# Patient Record
Sex: MALE | Race: WHITE | NOT HISPANIC OR LATINO | Employment: UNEMPLOYED | URBAN - METROPOLITAN AREA
[De-identification: names, ages, dates, MRNs, and addresses within clinical notes are randomized per-mention and may not be internally consistent; named-entity substitution may affect disease eponyms.]

---

## 2017-06-15 ENCOUNTER — HOSPITAL ENCOUNTER (EMERGENCY)
Facility: HOSPITAL | Age: 27
Discharge: HOME | End: 2017-06-15
Attending: EMERGENCY MEDICINE | Admitting: EMERGENCY MEDICINE
Payer: MEDICARE

## 2017-06-15 ENCOUNTER — APPOINTMENT (EMERGENCY)
Dept: CT IMAGING | Facility: HOSPITAL | Age: 27
End: 2017-06-15
Payer: MEDICARE

## 2017-06-15 VITALS
TEMPERATURE: 98.3 F | HEART RATE: 82 BPM | DIASTOLIC BLOOD PRESSURE: 73 MMHG | OXYGEN SATURATION: 98 % | RESPIRATION RATE: 16 BRPM | WEIGHT: 211.64 LBS | SYSTOLIC BLOOD PRESSURE: 127 MMHG

## 2017-06-15 DIAGNOSIS — R10.9 RIGHT SIDED ABDOMINAL PAIN: Primary | ICD-10-CM

## 2017-06-15 LAB
ALBUMIN SERPL BCP-MCNC: 4.1 G/DL (ref 3.5–5)
ALP SERPL-CCNC: 95 U/L (ref 46–116)
ALT SERPL W P-5'-P-CCNC: 25 U/L (ref 12–78)
ANION GAP SERPL CALCULATED.3IONS-SCNC: 8 MMOL/L (ref 4–13)
AST SERPL W P-5'-P-CCNC: 15 U/L (ref 5–45)
BASOPHILS # BLD AUTO: 0.03 THOUSANDS/ΜL (ref 0–0.1)
BASOPHILS NFR BLD AUTO: 1 % (ref 0–1)
BILIRUB SERPL-MCNC: 0.3 MG/DL (ref 0.2–1)
BUN SERPL-MCNC: 13 MG/DL (ref 5–25)
CALCIUM SERPL-MCNC: 9.2 MG/DL (ref 8.3–10.1)
CHLORIDE SERPL-SCNC: 105 MMOL/L (ref 100–108)
CLARITY, POC: CLEAR
CO2 SERPL-SCNC: 31 MMOL/L (ref 21–32)
COLOR, POC: YELLOW
CREAT SERPL-MCNC: 0.88 MG/DL (ref 0.6–1.3)
EOSINOPHIL # BLD AUTO: 0.16 THOUSAND/ΜL (ref 0–0.61)
EOSINOPHIL NFR BLD AUTO: 3 % (ref 0–6)
ERYTHROCYTE [DISTWIDTH] IN BLOOD BY AUTOMATED COUNT: 12.9 % (ref 11.6–15.1)
EXT BILIRUBIN, UA: NORMAL
EXT BLOOD URINE: NORMAL
EXT GLUCOSE, UA: NORMAL
EXT KETONES: NORMAL
EXT NITRITE, UA: NORMAL
EXT PH, UA: 7.5
EXT PROTEIN, UA: NORMAL
EXT SPECIFIC GRAVITY, UA: 1
EXT UROBILINOGEN: 0.2
GFR SERPL CREATININE-BSD FRML MDRD: >60 ML/MIN/1.73SQ M
GLUCOSE SERPL-MCNC: 96 MG/DL (ref 65–140)
HCT VFR BLD AUTO: 45.4 % (ref 36.5–49.3)
HGB BLD-MCNC: 15.2 G/DL (ref 12–17)
LIPASE SERPL-CCNC: 124 U/L (ref 73–393)
LYMPHOCYTES # BLD AUTO: 1.65 THOUSANDS/ΜL (ref 0.6–4.47)
LYMPHOCYTES NFR BLD AUTO: 31 % (ref 14–44)
MCH RBC QN AUTO: 28.4 PG (ref 26.8–34.3)
MCHC RBC AUTO-ENTMCNC: 33.5 G/DL (ref 31.4–37.4)
MCV RBC AUTO: 85 FL (ref 82–98)
MONOCYTES # BLD AUTO: 0.43 THOUSAND/ΜL (ref 0.17–1.22)
MONOCYTES NFR BLD AUTO: 8 % (ref 4–12)
NEUTROPHILS # BLD AUTO: 3.14 THOUSANDS/ΜL (ref 1.85–7.62)
NEUTS SEG NFR BLD AUTO: 57 % (ref 43–75)
PLATELET # BLD AUTO: 210 THOUSANDS/UL (ref 149–390)
PMV BLD AUTO: 10.6 FL (ref 8.9–12.7)
POTASSIUM SERPL-SCNC: 4.2 MMOL/L (ref 3.5–5.3)
PROT SERPL-MCNC: 7.4 G/DL (ref 6.4–8.2)
RBC # BLD AUTO: 5.36 MILLION/UL (ref 3.88–5.62)
SODIUM SERPL-SCNC: 144 MMOL/L (ref 136–145)
WBC # BLD AUTO: 5.41 THOUSAND/UL (ref 4.31–10.16)
WBC # BLD EST: NORMAL 10*3/UL

## 2017-06-15 PROCEDURE — 96374 THER/PROPH/DIAG INJ IV PUSH: CPT

## 2017-06-15 PROCEDURE — 99284 EMERGENCY DEPT VISIT MOD MDM: CPT

## 2017-06-15 PROCEDURE — 36415 COLL VENOUS BLD VENIPUNCTURE: CPT | Performed by: EMERGENCY MEDICINE

## 2017-06-15 PROCEDURE — 85025 COMPLETE CBC W/AUTO DIFF WBC: CPT | Performed by: EMERGENCY MEDICINE

## 2017-06-15 PROCEDURE — 96361 HYDRATE IV INFUSION ADD-ON: CPT

## 2017-06-15 PROCEDURE — 83690 ASSAY OF LIPASE: CPT | Performed by: EMERGENCY MEDICINE

## 2017-06-15 PROCEDURE — 74176 CT ABD & PELVIS W/O CONTRAST: CPT

## 2017-06-15 PROCEDURE — 80053 COMPREHEN METABOLIC PANEL: CPT | Performed by: EMERGENCY MEDICINE

## 2017-06-15 PROCEDURE — 81002 URINALYSIS NONAUTO W/O SCOPE: CPT | Performed by: EMERGENCY MEDICINE

## 2017-06-15 RX ORDER — ACETAMINOPHEN 325 MG/1
650 TABLET ORAL ONCE
Status: COMPLETED | OUTPATIENT
Start: 2017-06-15 | End: 2017-06-15

## 2017-06-15 RX ORDER — IBUPROFEN 600 MG/1
600 TABLET ORAL EVERY 8 HOURS PRN
Qty: 12 TABLET | Refills: 0 | Status: SHIPPED | OUTPATIENT
Start: 2017-06-15 | End: 2018-04-26

## 2017-06-15 RX ORDER — KETOROLAC TROMETHAMINE 30 MG/ML
30 INJECTION, SOLUTION INTRAMUSCULAR; INTRAVENOUS ONCE
Status: COMPLETED | OUTPATIENT
Start: 2017-06-15 | End: 2017-06-15

## 2017-06-15 RX ORDER — ONDANSETRON 4 MG/1
4 TABLET, ORALLY DISINTEGRATING ORAL EVERY 6 HOURS PRN
Qty: 10 TABLET | Refills: 0 | Status: SHIPPED | OUTPATIENT
Start: 2017-06-15 | End: 2018-04-26

## 2017-06-15 RX ADMIN — SODIUM CHLORIDE 1000 ML: 0.9 INJECTION, SOLUTION INTRAVENOUS at 11:24

## 2017-06-15 RX ADMIN — KETOROLAC TROMETHAMINE 30 MG: 30 INJECTION, SOLUTION INTRAMUSCULAR at 11:24

## 2017-06-15 RX ADMIN — ACETAMINOPHEN 650 MG: 325 TABLET, FILM COATED ORAL at 12:19

## 2017-06-22 ENCOUNTER — HOSPITAL ENCOUNTER (EMERGENCY)
Facility: HOSPITAL | Age: 27
Discharge: HOME | End: 2017-06-22
Attending: EMERGENCY MEDICINE
Payer: MEDICARE

## 2017-06-22 ENCOUNTER — APPOINTMENT (EMERGENCY)
Dept: CT IMAGING | Facility: HOSPITAL | Age: 27
End: 2017-06-22
Payer: MEDICARE

## 2017-06-22 VITALS
RESPIRATION RATE: 18 BRPM | HEART RATE: 71 BPM | OXYGEN SATURATION: 96 % | SYSTOLIC BLOOD PRESSURE: 112 MMHG | WEIGHT: 211.64 LBS | TEMPERATURE: 97.8 F | DIASTOLIC BLOOD PRESSURE: 57 MMHG

## 2017-06-22 DIAGNOSIS — R10.9 ABDOMINAL PAIN: Primary | ICD-10-CM

## 2017-06-22 LAB
ALBUMIN SERPL BCP-MCNC: 3.9 G/DL (ref 3.5–5)
ALP SERPL-CCNC: 100 U/L (ref 46–116)
ALT SERPL W P-5'-P-CCNC: 27 U/L (ref 12–78)
ANION GAP SERPL CALCULATED.3IONS-SCNC: 7 MMOL/L (ref 4–13)
AST SERPL W P-5'-P-CCNC: 16 U/L (ref 5–45)
BASOPHILS # BLD AUTO: 0.04 THOUSANDS/ΜL (ref 0–0.1)
BASOPHILS NFR BLD AUTO: 1 % (ref 0–1)
BILIRUB SERPL-MCNC: 0.6 MG/DL (ref 0.2–1)
BUN SERPL-MCNC: 15 MG/DL (ref 5–25)
CALCIUM SERPL-MCNC: 9.1 MG/DL (ref 8.3–10.1)
CHLORIDE SERPL-SCNC: 105 MMOL/L (ref 100–108)
CO2 SERPL-SCNC: 30 MMOL/L (ref 21–32)
CREAT SERPL-MCNC: 0.89 MG/DL (ref 0.6–1.3)
EOSINOPHIL # BLD AUTO: 0.12 THOUSAND/ΜL (ref 0–0.61)
EOSINOPHIL NFR BLD AUTO: 2 % (ref 0–6)
ERYTHROCYTE [DISTWIDTH] IN BLOOD BY AUTOMATED COUNT: 13 % (ref 11.6–15.1)
GFR SERPL CREATININE-BSD FRML MDRD: >60 ML/MIN/1.73SQ M
GLUCOSE SERPL-MCNC: 101 MG/DL (ref 65–140)
HCT VFR BLD AUTO: 44.3 % (ref 36.5–49.3)
HGB BLD-MCNC: 15 G/DL (ref 12–17)
LIPASE SERPL-CCNC: 108 U/L (ref 73–393)
LYMPHOCYTES # BLD AUTO: 2.53 THOUSANDS/ΜL (ref 0.6–4.47)
LYMPHOCYTES NFR BLD AUTO: 32 % (ref 14–44)
MCH RBC QN AUTO: 28.4 PG (ref 26.8–34.3)
MCHC RBC AUTO-ENTMCNC: 33.9 G/DL (ref 31.4–37.4)
MCV RBC AUTO: 84 FL (ref 82–98)
MONOCYTES # BLD AUTO: 0.47 THOUSAND/ΜL (ref 0.17–1.22)
MONOCYTES NFR BLD AUTO: 6 % (ref 4–12)
NEUTROPHILS # BLD AUTO: 4.65 THOUSANDS/ΜL (ref 1.85–7.62)
NEUTS SEG NFR BLD AUTO: 59 % (ref 43–75)
PLATELET # BLD AUTO: 210 THOUSANDS/UL (ref 149–390)
PMV BLD AUTO: 10.9 FL (ref 8.9–12.7)
POTASSIUM SERPL-SCNC: 3.6 MMOL/L (ref 3.5–5.3)
PROT SERPL-MCNC: 7.2 G/DL (ref 6.4–8.2)
RBC # BLD AUTO: 5.28 MILLION/UL (ref 3.88–5.62)
SODIUM SERPL-SCNC: 142 MMOL/L (ref 136–145)
WBC # BLD AUTO: 7.81 THOUSAND/UL (ref 4.31–10.16)

## 2017-06-22 PROCEDURE — 85025 COMPLETE CBC W/AUTO DIFF WBC: CPT | Performed by: EMERGENCY MEDICINE

## 2017-06-22 PROCEDURE — 96375 TX/PRO/DX INJ NEW DRUG ADDON: CPT

## 2017-06-22 PROCEDURE — 74177 CT ABD & PELVIS W/CONTRAST: CPT

## 2017-06-22 PROCEDURE — 80053 COMPREHEN METABOLIC PANEL: CPT | Performed by: EMERGENCY MEDICINE

## 2017-06-22 PROCEDURE — 96374 THER/PROPH/DIAG INJ IV PUSH: CPT

## 2017-06-22 PROCEDURE — 36415 COLL VENOUS BLD VENIPUNCTURE: CPT | Performed by: EMERGENCY MEDICINE

## 2017-06-22 PROCEDURE — 83690 ASSAY OF LIPASE: CPT | Performed by: EMERGENCY MEDICINE

## 2017-06-22 PROCEDURE — 99284 EMERGENCY DEPT VISIT MOD MDM: CPT

## 2017-06-22 RX ORDER — KETOROLAC TROMETHAMINE 30 MG/ML
15 INJECTION, SOLUTION INTRAMUSCULAR; INTRAVENOUS ONCE
Status: COMPLETED | OUTPATIENT
Start: 2017-06-22 | End: 2017-06-22

## 2017-06-22 RX ORDER — ONDANSETRON 2 MG/ML
4 INJECTION INTRAMUSCULAR; INTRAVENOUS ONCE
Status: COMPLETED | OUTPATIENT
Start: 2017-06-22 | End: 2017-06-22

## 2017-06-22 RX ORDER — TRAMADOL HYDROCHLORIDE 50 MG/1
50 TABLET ORAL EVERY 6 HOURS PRN
Qty: 30 TABLET | Refills: 0 | Status: SHIPPED | OUTPATIENT
Start: 2017-06-22 | End: 2017-07-02

## 2017-06-22 RX ORDER — ACETAMINOPHEN 325 MG/1
650 TABLET ORAL ONCE
Status: COMPLETED | OUTPATIENT
Start: 2017-06-22 | End: 2017-06-22

## 2017-06-22 RX ADMIN — ONDANSETRON 4 MG: 2 INJECTION INTRAMUSCULAR; INTRAVENOUS at 18:51

## 2017-06-22 RX ADMIN — IOHEXOL 50 ML: 240 INJECTION, SOLUTION INTRATHECAL; INTRAVASCULAR; INTRAVENOUS; ORAL at 18:51

## 2017-06-22 RX ADMIN — KETOROLAC TROMETHAMINE 15 MG: 30 INJECTION, SOLUTION INTRAMUSCULAR at 18:50

## 2017-06-22 RX ADMIN — ACETAMINOPHEN 650 MG: 325 TABLET, FILM COATED ORAL at 20:34

## 2017-06-22 RX ADMIN — IOHEXOL 100 ML: 350 INJECTION, SOLUTION INTRAVENOUS at 19:57

## 2017-06-27 ENCOUNTER — TRANSCRIBE ORDERS (OUTPATIENT)
Dept: ADMINISTRATIVE | Facility: HOSPITAL | Age: 27
End: 2017-06-27

## 2017-06-27 DIAGNOSIS — R10.11 ABDOMINAL PAIN, RIGHT UPPER QUADRANT: Primary | ICD-10-CM

## 2017-06-29 ENCOUNTER — HOSPITAL ENCOUNTER (OUTPATIENT)
Dept: ULTRASOUND IMAGING | Facility: HOSPITAL | Age: 27
Discharge: HOME | End: 2017-06-29
Attending: INTERNAL MEDICINE
Payer: MEDICARE

## 2017-06-29 DIAGNOSIS — R10.11 ABDOMINAL PAIN, RIGHT UPPER QUADRANT: ICD-10-CM

## 2017-06-29 PROCEDURE — 76705 ECHO EXAM OF ABDOMEN: CPT

## 2018-04-01 ENCOUNTER — HOSPITAL ENCOUNTER (EMERGENCY)
Facility: HOSPITAL | Age: 28
Discharge: HOME | End: 2018-04-01
Attending: EMERGENCY MEDICINE | Admitting: EMERGENCY MEDICINE
Payer: MEDICARE

## 2018-04-01 VITALS
HEART RATE: 97 BPM | TEMPERATURE: 98.3 F | OXYGEN SATURATION: 98 % | WEIGHT: 199.4 LBS | RESPIRATION RATE: 16 BRPM | DIASTOLIC BLOOD PRESSURE: 87 MMHG | SYSTOLIC BLOOD PRESSURE: 145 MMHG

## 2018-04-01 DIAGNOSIS — K62.5 RECTAL BLEEDING: ICD-10-CM

## 2018-04-01 DIAGNOSIS — K62.3 RECTAL PROLAPSE: Primary | ICD-10-CM

## 2018-04-01 PROCEDURE — 99283 EMERGENCY DEPT VISIT LOW MDM: CPT

## 2018-04-02 NOTE — ED PROVIDER NOTES
History  Chief Complaint   Patient presents with    Rectal Pain     Pt reports increase rectal pain/bleeding  Pt reports hx of rectal proplapse  22-year-old male presents to the emergency department for evaluation of rectal discomfort  Patient has a longstanding history of constipation  Patient states that he has a history of rectal prolapse  He was evaluated by Colorectal surgery who recommended the patient try modifying his diet prior to have any surgical intervention for his rectal prolapse  Patient states that he has been having 2 bowel movements daily  He does no rectal prolapse on most bowel movements  This evening he had a bowel movement and his rectum prolapsed more than it had ever in the past and he noticed a small amount of bright red bleeding  Patient is able to mainly reduce the prolapse at home  Patient presents because he is now interested in pursuing surgery  Patient denies fevers or chills  He has mild abdominal pain which he reports is chronic  He has no lightheadedness or palpitations  No shortness of breath  He has no active bleeding  History provided by:  Patient and medical records  Rectal Bleeding - Minor   Quality:  Bright red  Amount:  Unable to specify  Duration: Chronic, daily  Timing:  Intermittent  Chronicity:  Chronic  Context: constipation, defecation and rectal pain    Context: not diarrhea, not foreign body and not rectal injury    Pain details:     Quality:  Sharp    Severity:  Mild    Timing:  Worse with defecation    Progression:  Waxing and waning  Similar prior episodes: yes    Relieved by:  Nothing  Worsened by:  Nothing  Associated symptoms: abdominal pain    Associated symptoms: no dizziness, no fever, no recent illness and no vomiting    Risk factors: no anticoagulant use, no hx of colorectal cancer and no hx of colorectal surgery        Prior to Admission Medications   Prescriptions Last Dose Informant Patient Reported?  Taking?   ibuprofen (MOTRIN) 600 mg tablet   No No   Sig: Take 1 tablet by mouth every 8 (eight) hours as needed for mild pain or moderate pain (pain)   ondansetron (ZOFRAN-ODT) 4 mg disintegrating tablet   No No   Sig: Take 1 tablet by mouth every 6 (six) hours as needed for nausea      Facility-Administered Medications: None       Past Medical History:   Diagnosis Date    Asperger's syndrome        Past Surgical History:   Procedure Laterality Date    HERNIA REPAIR      SURGERY SCROTAL / TESTICULAR         History reviewed  No pertinent family history  I have reviewed and agree with the history as documented  Social History   Substance Use Topics    Smoking status: Current Every Day Smoker     Packs/day: 0 50    Smokeless tobacco: Not on file    Alcohol use No        Review of Systems   Constitutional: Negative for fever  Gastrointestinal: Positive for abdominal pain, anal bleeding, constipation, hematochezia and rectal pain  Negative for blood in stool and vomiting  Neurological: Negative for dizziness  All other systems reviewed and are negative  Physical Exam  ED Triage Vitals [04/01/18 2256]   Temperature Pulse Respirations Blood Pressure SpO2   98 3 °F (36 8 °C) 97 16 145/87 98 %      Temp Source Heart Rate Source Patient Position - Orthostatic VS BP Location FiO2 (%)   Oral -- Sitting Left arm --      Pain Score       4           Orthostatic Vital Signs  Vitals:    04/01/18 2256   BP: 145/87   Pulse: 97   Patient Position - Orthostatic VS: Sitting       Physical Exam   Constitutional: He is oriented to person, place, and time  He appears well-developed and well-nourished  HENT:   Head: Normocephalic  Right Ear: External ear normal    Left Ear: External ear normal    Nose: Nose normal    Mouth/Throat: Oropharynx is clear and moist    Eyes: EOM and lids are normal  Pupils are equal, round, and reactive to light  Neck: Normal range of motion  Neck supple  Pulmonary/Chest: Effort normal  No respiratory distress  Abdominal: Soft  Bowel sounds are normal  He exhibits no distension and no mass  There is no tenderness  There is no rebound and no guarding  No hernia  Genitourinary: Rectal exam shows fissure  Rectal exam shows no external hemorrhoid and no mass  Genitourinary Comments: There is no rectal prolapse at this time  When the patient bears down gently the rectum does prolapse but then returns to his normal and topical position  There is inflammation of the anal sphincter and appears to be a small anterior anal fissure that may have been bleeding  This appears chronic and is nontender   Musculoskeletal: Normal range of motion  He exhibits no deformity  Neurological: He is alert and oriented to person, place, and time  Skin: Skin is warm and dry  Psychiatric: He has a normal mood and affect  Nursing note and vitals reviewed  ED Medications  Medications - No data to display    Diagnostic Studies  Results Reviewed     None                 No orders to display              Procedures  Procedures       Phone Contacts  ED Phone Contact    ED Course  ED Course                                MDM  Number of Diagnoses or Management Options  Rectal bleeding: new and requires workup  Rectal prolapse: new and requires workup     Amount and/or Complexity of Data Reviewed  Decide to obtain previous medical records or to obtain history from someone other than the patient: yes  Independent visualization of images, tracings, or specimens: yes    Risk of Complications, Morbidity, and/or Mortality  General comments: 49-year-old male with chronic rectal prolapse and intermittent rectal bleeding presents to the ED because he would like to discuss surgical options with Colorectal surgery  Patient is not currently bleeding  He has normal vital signs  He does not have a prolapse that needs to be reduced  Discussed signs and symptoms to return to the emergency department    Patient will contact Colorectal surgery's office tomorrow to arrange for a follow-up appointment to discuss surgical options      CritCare Time    Disposition  Final diagnoses:   Rectal prolapse   Rectal bleeding     Time reflects when diagnosis was documented in both MDM as applicable and the Disposition within this note     Time User Action Codes Description Comment    4/1/2018 11:14 PM Maxx Cole Add [K62 3] Rectal prolapse     4/1/2018 11:14 PM SarayTomasa Add [K62 5] Rectal bleeding       ED Disposition     ED Disposition Condition Comment    Discharge  Guanako Casiano discharge to home/self care  Condition at discharge: Stable        Follow-up Information     Follow up With Specialties Details Why Contact Info    Essie Pineda MD Colon and Rectal Surgery Schedule an appointment as soon as possible for a visit in 1 day For recheck of current symptoms [address]  [phone]          Discharge Medication List as of 4/1/2018 11:16 PM      CONTINUE these medications which have NOT CHANGED    Details   ibuprofen (MOTRIN) 600 mg tablet Take 1 tablet by mouth every 8 (eight) hours as needed for mild pain or moderate pain (pain), Starting Thu 6/15/2017, Print      ondansetron (ZOFRAN-ODT) 4 mg disintegrating tablet Take 1 tablet by mouth every 6 (six) hours as needed for nausea, Starting Thu 6/15/2017, Print           No discharge procedures on file      ED Provider  Electronically Signed by           Cathy Apley, DO  04/01/18 9341

## 2018-04-02 NOTE — DISCHARGE INSTRUCTIONS
Rectal Prolapse   WHAT YOU NEED TO KNOW:   What is a rectal prolapse? A rectal prolapse is a condition that causes your rectum to come through your anus  The rectum is the end of your bowel  A prolapse may happen during a bowel movement  A prolapse may happen more often in women after childbirth or who are older than 50 years  What causes a rectal prolapse? · Damage to your anal sphincter  may cause your anus to open wider than it should  Your anal sphincter is a ring of muscle around your anus  This muscle controls the opening and closing of your anus  Damage to your anal sphincter may occur from trauma such as pelvic floor surgery  · Damage to the muscles and ligaments  of your rectum and anus may cause a rectal prolapse  Ligaments help hold your rectum and anus in place  Muscles control how your rectum moves bowel movements through your anus  These muscles may become damaged from trauma such as childbirth or pelvic floor surgery  · You may have more space  between your rectum and other organs in your pelvis  Your rectum may be longer than normal  These changes can cause your rectum to move out of place and through your anus  What increases my risk for a rectal prolapse? · Constipation  may cause you to push too hard during a bowel movement  The pressure from pushing may cause the rectum to come through the anus  · A chronic condition  can cause problems that lead to a prolapse  Cystic fibrosis can cause chronic constipation  Multiple sclerosis can weaken muscles that hold your rectum in place  · A condition  such as a stroke or spinal cord injury can damage nerves and muscles that make the rectum work  Rectal muscles may be too weak to hold your rectum in place  · Pelvic surgery  can damage the nerves, muscles or ligaments of the rectum  What are the signs and symptoms of a rectal prolapse?    · Pain or discomfort during a bowel movement    · A swollen, red mass coming from your anus    · Bleeding or mucus from your rectum    · A small amount of blood in your bowel movement    · Feeling like you still need to have a bowel movement after you use the bathroom    · Trouble controlling your bowel movements  How is a rectal prolapse diagnosed? Your healthcare provider will ask when the prolapse happened  He will ask about any pain or other symptoms you had during and after the prolapse  He may ask about your bowel habits and foods you eat  Tell him about other medical conditions you have  If you are a woman, he may ask if you recently gave birth  You may also need any of the following tests:  · Your healthcare provider will examine your anus  to check for a rectal prolapse  He may also check for rectal polyps  A rectal polyp is a small growth of tissue in the lining of the rectum  He may also feel inside of your anus to check for bumps that he cannot see from the outside  He may have you push like you are having a bowel movement and watch for a rectal prolapse  · An x-ray, ultrasound, CT, or MRI  may show problems with your rectum or the muscles, nerves and ligaments that control it  You may be given contrast liquid to help the rectum show up better in the pictures  You may be given contrast dye into your rectum  Tell the healthcare provider if you have ever had an allergic reaction to contrast liquid  Do not enter the MRI room with anything metal  Metal can cause serious injury  Tell the healthcare provider if you have any metal in or on your body  How is a rectal prolapse treated? Treatment of rectal prolapse depends on the severity  You may need any of the following:  · Stool softeners  help prevent constipation  · Laxatives  help your intestines relax and loosen to prevent constipation  · Injections  may prevent your rectum from moving through your anus  You may be given one or more shots of numbing medicine   A needle will be inserted into the rectum and medicine will be given  You may feel some pushing or discomfort as the needle enters your rectum  · Surgery  may be needed if other treatments do not work  The type of surgery may depend on the cause of your rectal prolapse  Surgery can help position your rectum so that it does not come down through your anus  Surgery may include placing sutures or mesh to hold the rectum in place  It may also involve removing part of the rectum  How can I decrease my risk for a rectal prolapse? · Eat more high-fiber foods  This may help decrease constipation by adding bulk and softness to your bowel movements  Your healthcare provider can help you create a meal plan that includes high-fiber foods  High fiber foods include fruit, vegetables, whole-grain breads, and cooked beans  · Increase the amount of liquid you drink  Liquids can help keep your bowel movements soft and prevent constipation  Ask your healthcare provider how much liquid you should drink each day  · Exercise your pelvic muscles  Kegel exercises strengthen the pelvic muscles  These exercises involve tightening and relaxing vaginal and rectal muscles  Kegel exercises can make the rectal muscles stronger and improve bowel control  Ask your healthcare provider for more information on how to do kegel exercises  · Do not sit for long amounts of time  You may put too much pressure on your anus  Pressure on your anus may cause a rectal prolapse  What is manual reduction of a rectal prolapse? Manual reduction is a procedure you can do to place your rectum back inside of your anus  Your healthcare provider will show you how to do a manual reduction  You may need a family member to help you with manual reduction  The following are general steps to follow  Your healthcare provider may give you specific steps to follow  · Your healthcare provider may tell you to apply sugar to your rectum before manual reduction   This may help decrease the swelling of your rectum, and make it easier to put back inside your anus  Ask your healthcare provider about how to apply sugar to your rectum  · Lie on your back with your knees bent  · Wash your hands and put on gloves  Lubricate your glove with petroleum jelly  · Hold your rectum on both sides of the anus  Gently apply firm, steady pressure on your rectum and push it into your anus  You may need to apply pressure for several minutes if the bowel is swollen  Inspect your anus  You can use a mirror or have your family member inspect your anus  You should not see the rectum  If a prolapse happens again, you can repeat manual reduction  · You can hold the rectum in place with gauze and tape across your buttocks  Before you apply gauze, place a quarter size amount of petroleum jelly on the gauze  The petroleum jelly will prevent the gauze from sticking to your rectum  Remove the gauze as directed by your healthcare provider  Call 911 for any of the following:   · You have trouble breathing  · Your heart is beating faster than usual   When should I seek immediate care? · You have severe pain in your abdomen  · Your abdomen looks bigger than usual      · Blood from your rectum soaks through your underwear  When should I contact my healthcare provider? · You have a fever  · You have nausea or are vomiting  · You see larger amounts of blood in your bowel movement than before  · You have questions or concerns about your condition or care  CARE AGREEMENT:   You have the right to help plan your care  Learn about your health condition and how it may be treated  Discuss treatment options with your caregivers to decide what care you want to receive  You always have the right to refuse treatment  The above information is an  only  It is not intended as medical advice for individual conditions or treatments   Talk to your doctor, nurse or pharmacist before following any medical regimen to see if it is safe and effective for you  © 2017 [employer] Information is for End User's use only and may not be sold, redistributed or otherwise used for commercial purposes  All illustrations and images included in CareNotes® are the copyrighted property of [employer]  or [employer]  Rectal Bleeding   WHAT YOU NEED TO KNOW:   What can cause rectal bleeding? · Constipation    · Hemorrhoids (swollen blood vessels in your rectum)    · Anal fissures (tears in the tissue inside your anus)    · Medical conditions, such as cancer, colitis, or diverticulitis     · Growths, such as tumors or polyps    · Medical treatments, such as radiation or rectal surgery  What increases my risk for rectal bleeding? · Older age    · Certain medicines, such as blood thinners and NSAIDs    · Medical conditions, such as inflammatory bowel disease, liver disease, or HIV  What other signs and symptoms may happen with rectal bleeding? You may have pain in your rectum or anus  You may also have abdominal pain or cramping  How is the cause of rectal bleeding diagnosed? · Rectal exam:  Your healthcare provider may gently insert a gloved finger into your anus  He will collect a bowel movement sample and send it to a lab for tests  · Blood tests: You may need blood taken to check for anemia (low amount of red blood cells)  · CT scan: This test is also called a CAT scan  An x-ray machine uses a computer to take pictures of the organs and blood vessels in your abdomen  The pictures may show problems that could cause bleeding  You may be given a dye before the pictures are taken to help healthcare providers see the pictures better  Tell the healthcare provider if you have ever had an allergic reaction to contrast dye  · Colonoscopy: This is a procedure to look inside your lower bowel  It may show where the bleeding is coming from and what is causing it   A tube with a light on the end will be put into your anus and then moved into your colon  If your healthcare provider finds a growth, he may remove it  · Endoscopy: This is a procedure to look inside your upper bowel  It may show where the bleeding is coming from and what is causing it  A tube with a light on the end is inserted into your throat and moved down into your stomach and upper bowel  If your healthcare provider finds a growth, he may remove it  He may put a shot of medicine in bleeding areas to narrow the blood vessels and stop the bleeding  Heat, laser, or electric currents may also be used to make the blood clot  How is rectal bleeding treated? · Medicine:      ¨ Pain medicine: You may be given a prescription medicine to decrease pain  Do not wait until the pain is severe before you take this medicine  ¨ Vasoconstrictors: This medicine decreases the size of your blood vessels and may help stop the bleeding  ¨ Iron supplement:  Iron helps your body make more red blood cells  ¨ Steroids: This medicine decreases inflammation in your rectum  It may be applied as a cream, ointment, or lotion  · IV:  You may need an IV if you are dehydrated and need extra liquids  · Blood transfusion:  You will get whole or parts of blood through an IV during a transfusion  Blood is tested for diseases, such as hepatitis and HIV, to be sure it is safe  · Surgery: You may need surgery to remove hemorrhoids, tumors, or polyps  What are the risks of rectal bleeding? · You may have abdominal pain or damage to nearby organs and blood vessels with surgery  Even with treatment, rectal bleeding may continue  Or, it may go away for a time and start again  · Without treatment, you may continue to have pain and cramping  You may develop anemia  You may need a blood transfusion  You may lose a large amount of blood  This can be life-threatening  How can I manage my symptoms?   Ask your healthcare provider how much liquid to drink each day and which liquids are best for you  This will help prevent dehydration and constipation  When should I contact my healthcare provider? · You have a fever  · Your rectal bleeding stopped for a time, but has started again  · You have nausea  · You have cold, sweaty, pale skin  · You have changes in your bowel movements, such as diarrhea  · You have questions or concerns about your condition or care  When should I seek immediate care or call 911? · You are breathing faster than usual     · You are dizzy, lightheaded, or feel faint  · You are confused or cannot think clearly  · You urinate less than usual or not at all  · Your rectal bleeding is constant or heavy  · You have severe abdominal pain or cramping  CARE AGREEMENT:   You have the right to help plan your care  Learn about your health condition and how it may be treated  Discuss treatment options with your caregivers to decide what care you want to receive  You always have the right to refuse treatment  The above information is an  only  It is not intended as medical advice for individual conditions or treatments  Talk to your doctor, nurse or pharmacist before following any medical regimen to see if it is safe and effective for you  © 2017 [employer]  Information is for End User's use only and may not be sold, redistributed or otherwise used for commercial purposes  All illustrations and images included in CareNotes® are the copyrighted property of [employer]  or [employer]

## 2018-04-23 ENCOUNTER — APPOINTMENT (OUTPATIENT)
Dept: LAB | Facility: CLINIC | Age: 28
End: 2018-04-23
Payer: MEDICARE

## 2018-04-23 ENCOUNTER — OFFICE VISIT (OUTPATIENT)
Dept: LAB | Facility: CLINIC | Age: 28
End: 2018-04-23
Payer: MEDICARE

## 2018-04-23 ENCOUNTER — APPOINTMENT (OUTPATIENT)
Dept: RADIOLOGY | Facility: CLINIC | Age: 28
End: 2018-04-23
Payer: MEDICARE

## 2018-04-23 ENCOUNTER — TRANSCRIBE ORDERS (OUTPATIENT)
Dept: LAB | Facility: CLINIC | Age: 28
End: 2018-04-23

## 2018-04-23 DIAGNOSIS — K62.3 RECTAL PROLAPSE: ICD-10-CM

## 2018-04-23 DIAGNOSIS — K62.3 RECTAL PROLAPSE: Primary | ICD-10-CM

## 2018-04-23 LAB
ABO GROUP BLD: NORMAL
ALBUMIN SERPL BCP-MCNC: 4 G/DL (ref 3.5–5)
ALP SERPL-CCNC: 106 U/L (ref 46–116)
ALT SERPL W P-5'-P-CCNC: 20 U/L (ref 12–78)
ANION GAP SERPL CALCULATED.3IONS-SCNC: 9 MMOL/L (ref 4–13)
AST SERPL W P-5'-P-CCNC: 19 U/L (ref 5–45)
BASOPHILS # BLD AUTO: 0.02 THOUSANDS/ΜL (ref 0–0.1)
BASOPHILS NFR BLD AUTO: 0 % (ref 0–1)
BILIRUB SERPL-MCNC: 0.5 MG/DL (ref 0.2–1)
BILIRUB UR QL STRIP: NEGATIVE
BLD GP AB SCN SERPL QL: NEGATIVE
BUN SERPL-MCNC: 11 MG/DL (ref 5–25)
CALCIUM SERPL-MCNC: 9.1 MG/DL (ref 8.3–10.1)
CHLORIDE SERPL-SCNC: 104 MMOL/L (ref 100–108)
CLARITY UR: NORMAL
CO2 SERPL-SCNC: 30 MMOL/L (ref 21–32)
COLOR UR: YELLOW
CREAT SERPL-MCNC: 0.88 MG/DL (ref 0.6–1.3)
EOSINOPHIL # BLD AUTO: 0.09 THOUSAND/ΜL (ref 0–0.61)
EOSINOPHIL NFR BLD AUTO: 2 % (ref 0–6)
ERYTHROCYTE [DISTWIDTH] IN BLOOD BY AUTOMATED COUNT: 12.8 % (ref 11.6–15.1)
EST. AVERAGE GLUCOSE BLD GHB EST-MCNC: 100 MG/DL
GFR SERPL CREATININE-BSD FRML MDRD: 117 ML/MIN/1.73SQ M
GLUCOSE P FAST SERPL-MCNC: 92 MG/DL (ref 65–99)
GLUCOSE UR STRIP-MCNC: NEGATIVE MG/DL
HBA1C MFR BLD: 5.1 % (ref 4.2–6.3)
HCT VFR BLD AUTO: 44.8 % (ref 36.5–49.3)
HGB BLD-MCNC: 14.7 G/DL (ref 12–17)
HGB UR QL STRIP.AUTO: NEGATIVE
KETONES UR STRIP-MCNC: NEGATIVE MG/DL
LEUKOCYTE ESTERASE UR QL STRIP: NEGATIVE
LYMPHOCYTES # BLD AUTO: 1.92 THOUSANDS/ΜL (ref 0.6–4.47)
LYMPHOCYTES NFR BLD AUTO: 38 % (ref 14–44)
MCH RBC QN AUTO: 27.9 PG (ref 26.8–34.3)
MCHC RBC AUTO-ENTMCNC: 32.8 G/DL (ref 31.4–37.4)
MCV RBC AUTO: 85 FL (ref 82–98)
MONOCYTES # BLD AUTO: 0.33 THOUSAND/ΜL (ref 0.17–1.22)
MONOCYTES NFR BLD AUTO: 7 % (ref 4–12)
NEUTROPHILS # BLD AUTO: 2.73 THOUSANDS/ΜL (ref 1.85–7.62)
NEUTS SEG NFR BLD AUTO: 53 % (ref 43–75)
NITRITE UR QL STRIP: NEGATIVE
PH UR STRIP.AUTO: 7 [PH] (ref 4.5–8)
PLATELET # BLD AUTO: 215 THOUSANDS/UL (ref 149–390)
PMV BLD AUTO: 10 FL (ref 8.9–12.7)
POTASSIUM SERPL-SCNC: 4.1 MMOL/L (ref 3.5–5.3)
PROT SERPL-MCNC: 7 G/DL (ref 6.4–8.2)
PROT UR STRIP-MCNC: NEGATIVE MG/DL
RBC # BLD AUTO: 5.27 MILLION/UL (ref 3.88–5.62)
RH BLD: POSITIVE
SODIUM SERPL-SCNC: 143 MMOL/L (ref 136–145)
SP GR UR STRIP.AUTO: 1.01 (ref 1–1.03)
SPECIMEN EXPIRATION DATE: NORMAL
UROBILINOGEN UR QL STRIP.AUTO: 0.2 E.U./DL
WBC # BLD AUTO: 5.09 THOUSAND/UL (ref 4.31–10.16)

## 2018-04-23 PROCEDURE — 71046 X-RAY EXAM CHEST 2 VIEWS: CPT

## 2018-04-23 PROCEDURE — 83036 HEMOGLOBIN GLYCOSYLATED A1C: CPT

## 2018-04-23 PROCEDURE — 86850 RBC ANTIBODY SCREEN: CPT

## 2018-04-23 PROCEDURE — 36415 COLL VENOUS BLD VENIPUNCTURE: CPT

## 2018-04-23 PROCEDURE — 86901 BLOOD TYPING SEROLOGIC RH(D): CPT

## 2018-04-23 PROCEDURE — 93005 ELECTROCARDIOGRAM TRACING: CPT

## 2018-04-23 PROCEDURE — 86900 BLOOD TYPING SEROLOGIC ABO: CPT

## 2018-04-23 PROCEDURE — 85025 COMPLETE CBC W/AUTO DIFF WBC: CPT

## 2018-04-23 PROCEDURE — 80053 COMPREHEN METABOLIC PANEL: CPT

## 2018-04-23 PROCEDURE — 81003 URINALYSIS AUTO W/O SCOPE: CPT | Performed by: COLON & RECTAL SURGERY

## 2018-04-24 LAB
ATRIAL RATE: 66 BPM
P AXIS: 46 DEGREES
PR INTERVAL: 128 MS
QRS AXIS: 27 DEGREES
QRSD INTERVAL: 92 MS
QT INTERVAL: 404 MS
QTC INTERVAL: 423 MS
T WAVE AXIS: 28 DEGREES
VENTRICULAR RATE: 66 BPM

## 2018-04-24 PROCEDURE — 93010 ELECTROCARDIOGRAM REPORT: CPT | Performed by: INTERNAL MEDICINE

## 2018-05-07 ENCOUNTER — HOSPITAL ENCOUNTER (OUTPATIENT)
Facility: HOSPITAL | Age: 28
Setting detail: OUTPATIENT SURGERY
Discharge: HOME | DRG: 330 | End: 2018-05-07
Attending: COLON & RECTAL SURGERY | Admitting: COLON & RECTAL SURGERY
Payer: MEDICARE

## 2018-05-07 ENCOUNTER — ANESTHESIA EVENT (OUTPATIENT)
Dept: GASTROENTEROLOGY | Facility: HOSPITAL | Age: 28
DRG: 330 | End: 2018-05-07
Payer: MEDICARE

## 2018-05-07 ENCOUNTER — ANESTHESIA (OUTPATIENT)
Dept: GASTROENTEROLOGY | Facility: HOSPITAL | Age: 28
DRG: 330 | End: 2018-05-07
Payer: MEDICARE

## 2018-05-07 VITALS
OXYGEN SATURATION: 97 % | DIASTOLIC BLOOD PRESSURE: 70 MMHG | TEMPERATURE: 97.9 F | BODY MASS INDEX: 27.09 KG/M2 | RESPIRATION RATE: 18 BRPM | SYSTOLIC BLOOD PRESSURE: 110 MMHG | HEIGHT: 72 IN | WEIGHT: 200 LBS | HEART RATE: 52 BPM

## 2018-05-07 DIAGNOSIS — K62.3 RECTAL PROLAPSE: ICD-10-CM

## 2018-05-07 PROCEDURE — 88305 TISSUE EXAM BY PATHOLOGIST: CPT | Performed by: PATHOLOGY

## 2018-05-07 PROCEDURE — 0DJD8ZZ INSPECTION OF LOWER INTESTINAL TRACT, VIA NATURAL OR ARTIFICIAL OPENING ENDOSCOPIC: ICD-10-PCS | Performed by: COLON & RECTAL SURGERY

## 2018-05-07 RX ORDER — SODIUM CHLORIDE 9 MG/ML
50 INJECTION, SOLUTION INTRAVENOUS CONTINUOUS
Status: DISCONTINUED | OUTPATIENT
Start: 2018-05-07 | End: 2018-05-08 | Stop reason: HOSPADM

## 2018-05-07 RX ORDER — PROPOFOL 10 MG/ML
INJECTION, EMULSION INTRAVENOUS AS NEEDED
Status: DISCONTINUED | OUTPATIENT
Start: 2018-05-07 | End: 2018-05-07 | Stop reason: SURG

## 2018-05-07 RX ORDER — FLUTICASONE PROPIONATE 50 MCG
1 SPRAY, SUSPENSION (ML) NASAL DAILY
COMMUNITY
End: 2020-07-31

## 2018-05-07 RX ORDER — PROPOFOL 10 MG/ML
INJECTION, EMULSION INTRAVENOUS CONTINUOUS PRN
Status: DISCONTINUED | OUTPATIENT
Start: 2018-05-07 | End: 2018-05-07 | Stop reason: SURG

## 2018-05-07 RX ADMIN — PROPOFOL 120 MCG/KG/MIN: 10 INJECTION, EMULSION INTRAVENOUS at 09:59

## 2018-05-07 RX ADMIN — SODIUM CHLORIDE 50 ML/HR: 0.9 INJECTION, SOLUTION INTRAVENOUS at 09:54

## 2018-05-07 RX ADMIN — PROPOFOL 100 MG: 10 INJECTION, EMULSION INTRAVENOUS at 09:59

## 2018-05-07 NOTE — OP NOTE
OPERATIVE REPORT  PATIENT NAME: Evelina Harding    :  1990  MRN: [id number]  Pt Location: [facility] 03    SURGERY DATE: 2018    Surgeon(s) and Role:     * Cheli Campuzano MD - Primary     * Lali Reynoso MD - Assisting    Preop Diagnosis:  Rectal prolapse [K62 3] for pre-op evaluation    Post-Op Diagnosis Codes:     * Rectal prolapse [K62 3] for pre-op evaluation  Polyp transverse colon      Procedure(s) (LRB):  COLONOSCOPY (N/A)    Specimen(s):  ID Type Source Tests Collected by Time Destination   1 : descending  hot bx  Tissue Polyp, Colorectal TISSUE EXAM Cheli Campuzano MD 2018 [id number]        Estimated Blood Loss:   Minimal    Drains:       Anesthesia Type:   IV Sedation with Anesthesia    Operative Indications:  Rectal prolapse [K62 3]      Operative Findings:  5 mm polyp transverse colon hot biopsy excised  Solitary rectal ulcer 10 cm from the anal verge  Ring of the intussuscepum seen just above the solitary rectal ulcer  Otherwise no other polyp tumor inflammation diverticular disease or mucosal pathology was seen throughout the colon up to cecum    Complications:   None    Procedure and Technique:  Colonoscopy Procedure Note    Procedure: Colonoscopy --diagnostic    Procedure Details     Informed consent was obtained for the procedure, including sedation  Risks of perforation, hemorrhage, adverse drug reaction and aspiration were discussed  The patient was placed in the left lateral decubitus position  Based on the pre-procedure assessment, including review of the patient's medical history, medications, allergies, and review of systems, he had been deemed to be an appropriate candidate for conscious sedation; he was therefore sedated with the medications listed below  The patient was monitored continuously with ECG tracing, pulse oximetry, blood pressure monitoring, and direct observations  A rectal examination was performed    The variable-stiffness pediatric colonoscope was inserted into the rectum and advanced under direct vision to the cecum, which was identified by the appendiceal orifice  The quality of the colonic preparation was excellent  A careful inspection was made as the colonoscope was withdrawn, including a retroflexed view of the rectum; findings and interventions are described below  Appropriate photodocumentation was obtained  Findings:  -polyp(s) - #1, 5 mm in size, located in the transverse colon, removed by hot biopsy and sent for pathology  -Solitary rectal ulcer 10 cm from the anal verge   -otherwise no polyp tumor inflammation or mucosal pathology was seen throughout the colon to cecum           Complications:  None; patient tolerated the procedure well  Disposition: PACU            Condition: stable    Attending Attestation: I was present for the entire procedure    Impression:    -Benign appearing colon polyps, removed as above  ,   -solitary rectal ulcer secondary to rectal prolapse  -Rectal intussusceptum just above the solitary rectal ulcer    Recommendations: For surgery laparoscopic rectopexy as planned and scheduled tomorrow  -Await pathology  ,   -make sure to take the antibiotics tonight including neomycin and Flagyl as instructed     I was present for the entire procedure    Patient Disposition:  PACU     SIGNATURE: Cristina Duarte MD  DATE: May 7, 2018  TIME: 10:27 AM

## 2018-05-07 NOTE — H&P
History and Physical   Colon and Rectal Surgery   Latosha Dior 29 y o  male MRN: [id number]  Unit/Bed#: [facility] Encounter: [id number]  05/07/18   9:49 AM      No chief complaint on file  History of Present Illness   HPI:  Latosha Dior is a 29 y o  male who presents with rectal prolapse for surgery tomorrow, as scheduled  Historical Information   Past Medical History:   Diagnosis Date    Asperger's syndrome      Past Surgical History:   Procedure Laterality Date    HERNIA REPAIR      SURGERY SCROTAL / TESTICULAR         Meds/Allergies     Prescriptions Prior to Admission   Medication    fluticasone (FLONASE) 50 mcg/act nasal spray       No current facility-administered medications for this encounter  No Known Allergies      Social History   History   Alcohol Use No     History   Drug Use No     History   Smoking Status    Current Every Day Smoker    Packs/day: 0 50   Smokeless Tobacco    Never Used         Family History: History reviewed  No pertinent family history  Objective     Current Vitals:      No intake or output data in the 24 hours ending 05/07/18 0949    Physical Exam:  General: No acute distress  Eyes: Normal   ENT: Normal   Neck: No JVD  Pulm: Normal in A&P  CV: NSR no murmur  Abdomen: Soft and normal on palpation, no mass, no tenderness, no guarding  Rectal: Normal sphincter tone with mild attenuation, no perianal skin lesions, rectal prolapse reproduced on straining  Extremities: Normal  Lymphatics: Normal        Lab Results: I have personally reviewed pertinent lab results  Imaging: I have personally reviewed pertinent reports  Patient was consented by myself for procedure as explained earlier with all the risks and benefits described  All questions answered  ASSESSMENT:  Latosha Dior is a 29 y o  male who presents with rectal prolapse for surgery tomorrow, as scheduled  Pearl Paul       PLAN:  colonoscopy  Tomorrow: lap proctoscopy with or without sigmoid colon resection

## 2018-05-07 NOTE — ANESTHESIA POSTPROCEDURE EVALUATION
Post-Op Assessment Note      CV Status:  Stable    Mental Status:  Somnolent    Hydration Status:  Euvolemic    PONV Controlled:  Controlled    Airway Patency:  Patent    Post Op Vitals Reviewed: Yes          Staff: CRNA           BP 97/62 (05/07/18 1030)    Temp      Pulse 62 (05/07/18 1030)   Resp 16 (05/07/18 1030)    SpO2 98 % (05/07/18 1030)

## 2018-05-07 NOTE — ANESTHESIA PREPROCEDURE EVALUATION
Review of Systems/Medical History    Chart reviewed  No history of anesthetic complications     Cardiovascular   Pulmonary  Negative pulmonary ROS        GI/Hepatic    GERD ,  Hiatal hernia,        Negative  ROS        Endo/Other  Negative endo/other ROS      GYN       Hematology  Negative hematology ROS      Musculoskeletal  Negative musculoskeletal ROS        Neurology      Comment: [employer] [facility]           Physical Exam    Airway    Mallampati score: II  TM Distance: >3 FB  Neck ROM: full     Dental   No notable dental hx     Cardiovascular      Pulmonary      Other Findings        Anesthesia Plan  ASA Score- 2     Anesthesia Type- IV sedation with anesthesia with ASA Monitors  Additional Monitors:   Airway Plan:     Comment: GA backup  Plan Factors-    Induction- intravenous  Postoperative Plan-     Informed Consent- Anesthetic plan and risks discussed with patient  I personally reviewed this patient with the CRNA  Discussed and agreed on the Anesthesia Plan with the CRNA  Marisabel Ash

## 2018-05-08 ENCOUNTER — ANESTHESIA (OUTPATIENT)
Dept: PERIOP | Facility: HOSPITAL | Age: 28
DRG: 330 | End: 2018-05-08
Payer: MEDICARE

## 2018-05-08 ENCOUNTER — HOSPITAL ENCOUNTER (INPATIENT)
Facility: HOSPITAL | Age: 28
LOS: 1 days | Discharge: HOME | DRG: 330 | End: 2018-05-09
Attending: COLON & RECTAL SURGERY | Admitting: COLON & RECTAL SURGERY
Payer: MEDICARE

## 2018-05-08 ENCOUNTER — ANESTHESIA EVENT (OUTPATIENT)
Dept: PERIOP | Facility: HOSPITAL | Age: 28
DRG: 330 | End: 2018-05-08
Payer: MEDICARE

## 2018-05-08 DIAGNOSIS — K62.3 RECTAL PROLAPSE: Primary | ICD-10-CM

## 2018-05-08 LAB
ABO GROUP BLD: NORMAL
BLD GP AB SCN SERPL QL: NEGATIVE
GLUCOSE SERPL-MCNC: 102 MG/DL (ref 65–140)
RH BLD: POSITIVE
SPECIMEN EXPIRATION DATE: NORMAL

## 2018-05-08 PROCEDURE — 86900 BLOOD TYPING SEROLOGIC ABO: CPT | Performed by: COLON & RECTAL SURGERY

## 2018-05-08 PROCEDURE — 86901 BLOOD TYPING SEROLOGIC RH(D): CPT | Performed by: COLON & RECTAL SURGERY

## 2018-05-08 PROCEDURE — 86850 RBC ANTIBODY SCREEN: CPT | Performed by: COLON & RECTAL SURGERY

## 2018-05-08 PROCEDURE — 0WJJ4ZZ INSPECTION OF PELVIC CAVITY, PERCUTANEOUS ENDOSCOPIC APPROACH: ICD-10-PCS | Performed by: COLON & RECTAL SURGERY

## 2018-05-08 PROCEDURE — 82948 REAGENT STRIP/BLOOD GLUCOSE: CPT

## 2018-05-08 PROCEDURE — 86923 COMPATIBILITY TEST ELECTRIC: CPT

## 2018-05-08 PROCEDURE — 0DQP0ZZ REPAIR RECTUM, OPEN APPROACH: ICD-10-PCS | Performed by: COLON & RECTAL SURGERY

## 2018-05-08 RX ORDER — LIDOCAINE HYDROCHLORIDE 10 MG/ML
INJECTION, SOLUTION INFILTRATION; PERINEURAL AS NEEDED
Status: DISCONTINUED | OUTPATIENT
Start: 2018-05-08 | End: 2018-05-08 | Stop reason: SURG

## 2018-05-08 RX ORDER — MEPERIDINE HYDROCHLORIDE 25 MG/ML
12.5 INJECTION INTRAMUSCULAR; INTRAVENOUS; SUBCUTANEOUS ONCE AS NEEDED
Status: DISCONTINUED | OUTPATIENT
Start: 2018-05-08 | End: 2018-05-08 | Stop reason: HOSPADM

## 2018-05-08 RX ORDER — FENTANYL CITRATE 50 UG/ML
INJECTION, SOLUTION INTRAMUSCULAR; INTRAVENOUS AS NEEDED
Status: DISCONTINUED | OUTPATIENT
Start: 2018-05-08 | End: 2018-05-08 | Stop reason: SURG

## 2018-05-08 RX ORDER — ONDANSETRON 2 MG/ML
4 INJECTION INTRAMUSCULAR; INTRAVENOUS EVERY 4 HOURS PRN
Status: DISCONTINUED | OUTPATIENT
Start: 2018-05-08 | End: 2018-05-09 | Stop reason: HOSPADM

## 2018-05-08 RX ORDER — NICOTINE 21 MG/24HR
1 PATCH, TRANSDERMAL 24 HOURS TRANSDERMAL DAILY
Status: DISCONTINUED | OUTPATIENT
Start: 2018-05-09 | End: 2018-05-09 | Stop reason: HOSPADM

## 2018-05-08 RX ORDER — SODIUM CHLORIDE, SODIUM LACTATE, POTASSIUM CHLORIDE, CALCIUM CHLORIDE 600; 310; 30; 20 MG/100ML; MG/100ML; MG/100ML; MG/100ML
50 INJECTION, SOLUTION INTRAVENOUS CONTINUOUS
Status: DISCONTINUED | OUTPATIENT
Start: 2018-05-08 | End: 2018-05-08

## 2018-05-08 RX ORDER — SODIUM CHLORIDE, SODIUM LACTATE, POTASSIUM CHLORIDE, CALCIUM CHLORIDE 600; 310; 30; 20 MG/100ML; MG/100ML; MG/100ML; MG/100ML
20 INJECTION, SOLUTION INTRAVENOUS CONTINUOUS
Status: DISCONTINUED | OUTPATIENT
Start: 2018-05-08 | End: 2018-05-08

## 2018-05-08 RX ORDER — MAGNESIUM HYDROXIDE 1200 MG/15ML
LIQUID ORAL AS NEEDED
Status: DISCONTINUED | OUTPATIENT
Start: 2018-05-08 | End: 2018-05-08 | Stop reason: HOSPADM

## 2018-05-08 RX ORDER — GLYCOPYRROLATE 0.2 MG/ML
INJECTION INTRAMUSCULAR; INTRAVENOUS AS NEEDED
Status: DISCONTINUED | OUTPATIENT
Start: 2018-05-08 | End: 2018-05-08 | Stop reason: SURG

## 2018-05-08 RX ORDER — HEPARIN SODIUM 5000 [USP'U]/ML
5000 INJECTION, SOLUTION INTRAVENOUS; SUBCUTANEOUS EVERY 8 HOURS SCHEDULED
Status: DISCONTINUED | OUTPATIENT
Start: 2018-05-08 | End: 2018-05-09 | Stop reason: HOSPADM

## 2018-05-08 RX ORDER — SODIUM CHLORIDE 9 MG/ML
100 INJECTION, SOLUTION INTRAVENOUS CONTINUOUS
Status: DISCONTINUED | OUTPATIENT
Start: 2018-05-08 | End: 2018-05-09

## 2018-05-08 RX ORDER — FENTANYL CITRATE/PF 50 MCG/ML
50 SYRINGE (ML) INJECTION
Status: CANCELLED | OUTPATIENT
Start: 2018-05-08

## 2018-05-08 RX ORDER — ACETAMINOPHEN 325 MG/1
650 TABLET ORAL EVERY 4 HOURS PRN
Status: DISCONTINUED | OUTPATIENT
Start: 2018-05-08 | End: 2018-05-09

## 2018-05-08 RX ORDER — MIDAZOLAM HYDROCHLORIDE 1 MG/ML
INJECTION INTRAMUSCULAR; INTRAVENOUS AS NEEDED
Status: DISCONTINUED | OUTPATIENT
Start: 2018-05-08 | End: 2018-05-08 | Stop reason: SURG

## 2018-05-08 RX ORDER — OXYCODONE HYDROCHLORIDE 10 MG/1
10 TABLET ORAL EVERY 4 HOURS PRN
Status: DISCONTINUED | OUTPATIENT
Start: 2018-05-08 | End: 2018-05-09 | Stop reason: HOSPADM

## 2018-05-08 RX ORDER — FENTANYL CITRATE/PF 50 MCG/ML
50 SYRINGE (ML) INJECTION
Status: DISCONTINUED | OUTPATIENT
Start: 2018-05-08 | End: 2018-05-08 | Stop reason: HOSPADM

## 2018-05-08 RX ORDER — ONDANSETRON 2 MG/ML
INJECTION INTRAMUSCULAR; INTRAVENOUS AS NEEDED
Status: DISCONTINUED | OUTPATIENT
Start: 2018-05-08 | End: 2018-05-08 | Stop reason: SURG

## 2018-05-08 RX ORDER — SODIUM CHLORIDE, SODIUM LACTATE, POTASSIUM CHLORIDE, CALCIUM CHLORIDE 600; 310; 30; 20 MG/100ML; MG/100ML; MG/100ML; MG/100ML
50 INJECTION, SOLUTION INTRAVENOUS CONTINUOUS
Status: CANCELLED | OUTPATIENT
Start: 2018-05-08

## 2018-05-08 RX ORDER — METOCLOPRAMIDE HYDROCHLORIDE 5 MG/ML
10 INJECTION INTRAMUSCULAR; INTRAVENOUS ONCE AS NEEDED
Status: DISCONTINUED | OUTPATIENT
Start: 2018-05-08 | End: 2018-05-08 | Stop reason: HOSPADM

## 2018-05-08 RX ORDER — HYDROMORPHONE HYDROCHLORIDE 2 MG/ML
INJECTION, SOLUTION INTRAMUSCULAR; INTRAVENOUS; SUBCUTANEOUS AS NEEDED
Status: DISCONTINUED | OUTPATIENT
Start: 2018-05-08 | End: 2018-05-08 | Stop reason: SURG

## 2018-05-08 RX ORDER — ONDANSETRON 2 MG/ML
4 INJECTION INTRAMUSCULAR; INTRAVENOUS ONCE AS NEEDED
Status: DISCONTINUED | OUTPATIENT
Start: 2018-05-08 | End: 2018-05-08 | Stop reason: HOSPADM

## 2018-05-08 RX ORDER — MEPERIDINE HYDROCHLORIDE 25 MG/ML
12.5 INJECTION INTRAMUSCULAR; INTRAVENOUS; SUBCUTANEOUS ONCE AS NEEDED
Status: CANCELLED | OUTPATIENT
Start: 2018-05-08

## 2018-05-08 RX ORDER — OXYCODONE HYDROCHLORIDE 5 MG/1
5 TABLET ORAL EVERY 4 HOURS PRN
Status: DISCONTINUED | OUTPATIENT
Start: 2018-05-08 | End: 2018-05-09 | Stop reason: HOSPADM

## 2018-05-08 RX ORDER — ROCURONIUM BROMIDE 10 MG/ML
INJECTION, SOLUTION INTRAVENOUS AS NEEDED
Status: DISCONTINUED | OUTPATIENT
Start: 2018-05-08 | End: 2018-05-08 | Stop reason: SURG

## 2018-05-08 RX ORDER — EPHEDRINE SULFATE 50 MG/ML
INJECTION, SOLUTION INTRAVENOUS AS NEEDED
Status: DISCONTINUED | OUTPATIENT
Start: 2018-05-08 | End: 2018-05-08 | Stop reason: SURG

## 2018-05-08 RX ORDER — METOCLOPRAMIDE HYDROCHLORIDE 5 MG/ML
10 INJECTION INTRAMUSCULAR; INTRAVENOUS ONCE AS NEEDED
Status: CANCELLED | OUTPATIENT
Start: 2018-05-08

## 2018-05-08 RX ORDER — PROPOFOL 10 MG/ML
INJECTION, EMULSION INTRAVENOUS AS NEEDED
Status: DISCONTINUED | OUTPATIENT
Start: 2018-05-08 | End: 2018-05-08 | Stop reason: SURG

## 2018-05-08 RX ORDER — ONDANSETRON 2 MG/ML
4 INJECTION INTRAMUSCULAR; INTRAVENOUS ONCE AS NEEDED
Status: CANCELLED | OUTPATIENT
Start: 2018-05-08

## 2018-05-08 RX ORDER — HEPARIN SODIUM 5000 [USP'U]/ML
5000 INJECTION, SOLUTION INTRAVENOUS; SUBCUTANEOUS ONCE
Status: COMPLETED | OUTPATIENT
Start: 2018-05-08 | End: 2018-05-08

## 2018-05-08 RX ORDER — FLUTICASONE PROPIONATE 50 MCG
1 SPRAY, SUSPENSION (ML) NASAL DAILY
Status: DISCONTINUED | OUTPATIENT
Start: 2018-05-09 | End: 2018-05-09 | Stop reason: HOSPADM

## 2018-05-08 RX ADMIN — FENTANYL CITRATE 50 MCG: 50 INJECTION, SOLUTION INTRAMUSCULAR; INTRAVENOUS at 15:04

## 2018-05-08 RX ADMIN — FENTANYL CITRATE 50 MCG: 50 INJECTION, SOLUTION INTRAMUSCULAR; INTRAVENOUS at 14:50

## 2018-05-08 RX ADMIN — LIDOCAINE HYDROCHLORIDE 50 MG: 10 INJECTION, SOLUTION INFILTRATION; PERINEURAL at 11:33

## 2018-05-08 RX ADMIN — HYDROMORPHONE HYDROCHLORIDE 0.5 MG: 2 INJECTION, SOLUTION INTRAMUSCULAR; INTRAVENOUS; SUBCUTANEOUS at 13:41

## 2018-05-08 RX ADMIN — OXYCODONE HYDROCHLORIDE 10 MG: 10 TABLET ORAL at 16:53

## 2018-05-08 RX ADMIN — LIDOCAINE HYDROCHLORIDE 10 MG: 10 INJECTION, SOLUTION INFILTRATION; PERINEURAL at 12:44

## 2018-05-08 RX ADMIN — FENTANYL CITRATE 50 MCG: 50 INJECTION, SOLUTION INTRAMUSCULAR; INTRAVENOUS at 12:07

## 2018-05-08 RX ADMIN — MIDAZOLAM HYDROCHLORIDE 2 MG: 1 INJECTION, SOLUTION INTRAMUSCULAR; INTRAVENOUS at 11:25

## 2018-05-08 RX ADMIN — ONDANSETRON 4 MG: 2 INJECTION INTRAMUSCULAR; INTRAVENOUS at 11:36

## 2018-05-08 RX ADMIN — FENTANYL CITRATE 50 MCG: 50 INJECTION, SOLUTION INTRAMUSCULAR; INTRAVENOUS at 14:30

## 2018-05-08 RX ADMIN — GLYCOPYRROLATE 0.4 MG: 0.2 INJECTION, SOLUTION INTRAMUSCULAR; INTRAVENOUS at 13:42

## 2018-05-08 RX ADMIN — FENTANYL CITRATE 50 MCG: 50 INJECTION, SOLUTION INTRAMUSCULAR; INTRAVENOUS at 11:33

## 2018-05-08 RX ADMIN — HYDROMORPHONE HYDROCHLORIDE 0.5 MG: 2 INJECTION, SOLUTION INTRAMUSCULAR; INTRAVENOUS; SUBCUTANEOUS at 13:23

## 2018-05-08 RX ADMIN — SODIUM CHLORIDE, SODIUM LACTATE, POTASSIUM CHLORIDE, AND CALCIUM CHLORIDE 20 ML/HR: .6; .31; .03; .02 INJECTION, SOLUTION INTRAVENOUS at 10:13

## 2018-05-08 RX ADMIN — SODIUM CHLORIDE, SODIUM LACTATE, POTASSIUM CHLORIDE, AND CALCIUM CHLORIDE: .6; .31; .03; .02 INJECTION, SOLUTION INTRAVENOUS at 12:31

## 2018-05-08 RX ADMIN — SODIUM CHLORIDE 100 ML/HR: 0.9 INJECTION, SOLUTION INTRAVENOUS at 22:10

## 2018-05-08 RX ADMIN — HEPARIN SODIUM 5000 UNITS: 5000 INJECTION, SOLUTION INTRAVENOUS; SUBCUTANEOUS at 10:31

## 2018-05-08 RX ADMIN — NEOSTIGMINE METHYLSULFATE 3 MG: 1 INJECTION, SOLUTION INTRAMUSCULAR; INTRAVENOUS; SUBCUTANEOUS at 13:42

## 2018-05-08 RX ADMIN — PROPOFOL 200 MG: 10 INJECTION, EMULSION INTRAVENOUS at 11:33

## 2018-05-08 RX ADMIN — HEPARIN SODIUM 5000 UNITS: 5000 INJECTION, SOLUTION INTRAVENOUS; SUBCUTANEOUS at 20:06

## 2018-05-08 RX ADMIN — DEXAMETHASONE SODIUM PHOSPHATE 10 MG: 10 INJECTION INTRAMUSCULAR; INTRAVENOUS at 11:36

## 2018-05-08 RX ADMIN — EPHEDRINE SULFATE 10 MG: 50 INJECTION, SOLUTION INTRAMUSCULAR; INTRAVENOUS; SUBCUTANEOUS at 12:01

## 2018-05-08 RX ADMIN — HYDROMORPHONE HYDROCHLORIDE 0.5 MG: 1 INJECTION, SOLUTION INTRAMUSCULAR; INTRAVENOUS; SUBCUTANEOUS at 20:06

## 2018-05-08 RX ADMIN — FENTANYL CITRATE 50 MCG: 50 INJECTION, SOLUTION INTRAMUSCULAR; INTRAVENOUS at 14:17

## 2018-05-08 RX ADMIN — ROCURONIUM BROMIDE 50 MG: 10 INJECTION INTRAVENOUS at 11:33

## 2018-05-08 RX ADMIN — GLYCOPYRROLATE 0.2 MG: 0.2 INJECTION, SOLUTION INTRAMUSCULAR; INTRAVENOUS at 12:01

## 2018-05-08 RX ADMIN — METRONIDAZOLE 500 MG: 500 INJECTION, SOLUTION INTRAVENOUS at 11:41

## 2018-05-08 RX ADMIN — SODIUM CHLORIDE 100 ML/HR: 0.9 INJECTION, SOLUTION INTRAVENOUS at 14:44

## 2018-05-08 NOTE — OP NOTE
OPERATIVE REPORT  PATIENT NAME: Portia Hui    :  1990  MRN: [id number]  Pt Location: [facility] OR ROOM 10    SURGERY DATE: 2018    Surgeon(s) and Role:     * Danette Etienne MD - Primary     * Neha Chadwick MD - Assisting    Preop Diagnosis:  Rectal prolapse [K62 3]    Post-Op Diagnosis Codes:     * Rectal prolapse [K62 3]    Procedure(s) (LRB):  LAPAROSCOPIC HAND-ASSIST REPAIR OF RECTAL PROLAPSE BY SACRAL  PROTOPEXY (N/A)    Specimen(s):  * No specimens in log *    Estimated Blood Loss:   25 mL    Drains:  Urethral Catheter Latex 16 Fr  (Active)   Number of days: 0       [REMOVED] NG/OG/Enteral Tube Orogastric 18 Fr Right mouth (Removed)   Number of days: 0       Anesthesia Type:   General    Operative Indications:  Rectal prolapse [Z00 3]      Complications:   None    Procedure and Technique:  Patient after proper facial on armband identification a supine position was placed under satisfactory general anesthesia with endotracheal tube  He was then changed to modified lithotomy position Thakkar was inserted abdomen was prepped by ChloraPrep and he was draped in sterile usual manner time-out was performed  Using a 0 degree 5 mm camera using ZMPview to 5 mm port abdominal cavity was entered through the umbilicus and insufflation was started and exploration of the abdomen was completed  Liver and gallbladder and small bowel were all normal on inspection colon was normal in the pelvis  The  Pelvic appearance was characteristic of rectal prolapse with deep peritoneal reflection  2 other  5 mm ports were inserted in the right lower quadrant and right upper quadrant  The dissection was started on the promontory and the presacral fascia was exposed behind the rectum  The hypogastric nerves were identified and left untouched    The dissection of the rectum from the sacrum was carried down from medial to lateral right to left dissection and the rectum was completely dissected free from the sacral attachments deep down to the pelvis exposing the levator muscles on both sides  Anteriorly and laterally the attachments of the rectum were left intact  Bilateral ureters were identified and protected throughout the case  7 cm incision in the midline above the pubis was made and hand port was inserted and through this the hand was used as part of the hand assist for easier and better dissection  Through the same wound protector 4 sutures of 0 Prolene was inserted on the presacral fascia at the level of S3 and S4 on the right and left side with secure attachments  These sutures were attached to the rectal mesentery and serosa at the level of the peritoneal reflection with satisfactory tension on the rectum to keep it from prolapse  The rectopexy was completed with no damage to the rectum or sigmoid colon and the bowel was replaced anatomically in the abdominal cavity  Saline solution was used on irrigation of the area  Sponge and counts were all correct and hemostasis was well affected  after change of gloves Fascia was closed using 1 a PDS running suture  Subcutaneous area was irrigated clean and the skin were closed using 4 O Monocryl subcuticular running suture  Histacryl  was applied     I was present for the entire procedure    Patient Disposition:  PACU     SIGNATURE: Kae Weiss MD  DATE: May 8, 2018  TIME: 2:03 PM

## 2018-05-08 NOTE — ANESTHESIA PREPROCEDURE EVALUATION
Review of Systems/Medical History  Patient summary reviewed  Chart reviewed      Cardiovascular  Negative cardio ROS    Pulmonary  Smoker (1/2 ppd, smoked 2 cigarettes this am) cigarette smoker  ,        GI/Hepatic      Comment: Rectal prolapse     Negative  ROS        Endo/Other  Negative endo/other ROS      GYN  Negative gynecology ROS          Hematology  Negative hematology ROS      Musculoskeletal  Negative musculoskeletal ROS        Neurology  Negative neurology ROS      Psychology       Comment: Asperger's syndrome         Physical Exam    Airway    Mallampati score: II  TM Distance: >3 FB       Dental   Comment: Crooked teeth,     Cardiovascular  Comment: Negative ROS, Rhythm: regular,     Pulmonary  Breath sounds clear to auscultation,     Other Findings        Anesthesia Plan  ASA Score- 2     Anesthesia Type- general with ASA Monitors  Additional Monitors:   Airway Plan: ETT  Plan Factors-    Induction- intravenous  Postoperative Plan- Plan for postoperative opioid use  Planned trial extubation    Informed Consent- Anesthetic plan and risks discussed with patient  I personally reviewed this patient with the CRNA  Discussed and agreed on the Anesthesia Plan with the CRNA  Wiley Anderson

## 2018-05-08 NOTE — ANESTHESIA POSTPROCEDURE EVALUATION
Post-Op Assessment Note      CV Status:  Stable    Mental Status:  Lethargic    Hydration Status:  Stable    PONV Controlled:  None    Airway Patency:  Patent and adequate    Post Op Vitals Reviewed: Yes          Staff: CRNA           BP   132/75   Temp   97 2   Pulse 80   Resp   16   SpO2   100

## 2018-05-08 NOTE — H&P
History and Physical   Colon and Rectal Surgery   Latosha Dior 29 y o  male MRN: [id number]  Unit/Bed#: [facility] [place] Encounter: [id number]  05/08/18   9:52 AM      No chief complaint on file  History of Present Illness   HPI:  Latosha Dior is a 29 y o  male who presents with rectal prolapse  Historical Information   Past Medical History:   Diagnosis Date    Asperger's syndrome     Rectal prolapse      Past Surgical History:   Procedure Laterality Date    HERNIA REPAIR      CA COLONOSCOPY FLX DX W/COLLJ SPEC WHEN PFRMD N/A 5/7/2018    Procedure: COLONOSCOPY;  Surgeon: Mya Kinsey MD;  Location:  GI LAB; Service: Colorectal    SURGERY SCROTAL / TESTICULAR         Meds/Allergies     Prescriptions Prior to Admission   Medication    fluticasone (FLONASE) 50 mcg/act nasal spray         Current Facility-Administered Medications:     ceFAZolin (ANCEF) 2 G in sodium chloride 0 9% 50 ml IVPB (CMPD ORDER), 2,000 mg, Intravenous, Once, Mya Kinsey MD    heparin (porcine) subcutaneous injection 5,000 Units, 5,000 Units, Subcutaneous, Once, Mya Kinsey MD    lactated ringers infusion, 20 mL/hr, Intravenous, Continuous, Mya Kinsey MD    metroNIDAZOLE (FLAGYL) IVPB (premix) 500 mg, 500 mg, Intravenous, Once, Mya Kinsey MD    No Known Allergies      Social History   History   Alcohol Use No     History   Drug Use No     History   Smoking Status    Current Every Day Smoker    Packs/day: 0 50   Smokeless Tobacco    Never Used         Family History: History reviewed  No pertinent family history        Objective     Current Vitals:   Blood Pressure: 113/74 (05/08/18 0937)  Pulse: (!) 54 (05/08/18 0937)  Temperature: 99 7 °F (37 6 °C) (05/08/18 0937)  Temp Source: Tympanic Core (05/08/18 0937)  Respirations: 16 (05/08/18 0937)  SpO2: 99 % (05/08/18 0937)  No intake or output data in the 24 hours ending 05/08/18 0952    Physical Exam:  General: No acute distress  Eyes: Normal   ENT: Normal   Neck: No JVD  Pulm: Normal in A&P  CV: NSR no murmur  Abdomen: Soft and normal on palpation, no mass, no tenderness, no guarding  Rectal: Normal sphincter tone, no perianal skin lesions  Extremities: Normal  Lymphatics: Normal        Lab Results: I have personally reviewed pertinent lab results  Imaging: I have personally reviewed pertinent reports  Patient was consented by myself for procedure as explained earlier with all the risks and benefits described  All questions answered  ASSESSMENT:  Rochelle Bone is a 29 y o  male who presents with rectal prolapse        PLAN:  Laparoscopic proctopexy with or without sigmoid colon resection

## 2018-05-09 VITALS
BODY MASS INDEX: 27.09 KG/M2 | RESPIRATION RATE: 20 BRPM | HEIGHT: 72 IN | OXYGEN SATURATION: 99 % | DIASTOLIC BLOOD PRESSURE: 72 MMHG | WEIGHT: 200 LBS | SYSTOLIC BLOOD PRESSURE: 126 MMHG | TEMPERATURE: 98.2 F | HEART RATE: 68 BPM

## 2018-05-09 PROBLEM — K62.3 RECTAL PROLAPSE: Status: ACTIVE | Noted: 2018-05-09

## 2018-05-09 LAB
ANION GAP SERPL CALCULATED.3IONS-SCNC: 8 MMOL/L (ref 4–13)
BASOPHILS # BLD AUTO: 0.01 THOUSANDS/ΜL (ref 0–0.1)
BASOPHILS NFR BLD AUTO: 0 % (ref 0–1)
BUN SERPL-MCNC: 7 MG/DL (ref 5–25)
CALCIUM SERPL-MCNC: 8.7 MG/DL (ref 8.3–10.1)
CHLORIDE SERPL-SCNC: 103 MMOL/L (ref 100–108)
CO2 SERPL-SCNC: 25 MMOL/L (ref 21–32)
CREAT SERPL-MCNC: 0.7 MG/DL (ref 0.6–1.3)
EOSINOPHIL # BLD AUTO: 0 THOUSAND/ΜL (ref 0–0.61)
EOSINOPHIL NFR BLD AUTO: 0 % (ref 0–6)
ERYTHROCYTE [DISTWIDTH] IN BLOOD BY AUTOMATED COUNT: 12.9 % (ref 11.6–15.1)
GFR SERPL CREATININE-BSD FRML MDRD: 128 ML/MIN/1.73SQ M
GLUCOSE SERPL-MCNC: 138 MG/DL (ref 65–140)
HCT VFR BLD AUTO: 42.1 % (ref 36.5–49.3)
HGB BLD-MCNC: 14.3 G/DL (ref 12–17)
LYMPHOCYTES # BLD AUTO: 0.8 THOUSANDS/ΜL (ref 0.6–4.47)
LYMPHOCYTES NFR BLD AUTO: 8 % (ref 14–44)
MAGNESIUM SERPL-MCNC: 2 MG/DL (ref 1.6–2.6)
MCH RBC QN AUTO: 29.2 PG (ref 26.8–34.3)
MCHC RBC AUTO-ENTMCNC: 34 G/DL (ref 31.4–37.4)
MCV RBC AUTO: 86 FL (ref 82–98)
MONOCYTES # BLD AUTO: 0.85 THOUSAND/ΜL (ref 0.17–1.22)
MONOCYTES NFR BLD AUTO: 9 % (ref 4–12)
NEUTROPHILS # BLD AUTO: 7.91 THOUSANDS/ΜL (ref 1.85–7.62)
NEUTS SEG NFR BLD AUTO: 83 % (ref 43–75)
NRBC BLD AUTO-RTO: 0 /100 WBCS
PLATELET # BLD AUTO: 167 THOUSANDS/UL (ref 149–390)
PMV BLD AUTO: 10.6 FL (ref 8.9–12.7)
POTASSIUM SERPL-SCNC: 3.9 MMOL/L (ref 3.5–5.3)
RBC # BLD AUTO: 4.9 MILLION/UL (ref 3.88–5.62)
SODIUM SERPL-SCNC: 136 MMOL/L (ref 136–145)
WBC # BLD AUTO: 9.61 THOUSAND/UL (ref 4.31–10.16)

## 2018-05-09 PROCEDURE — 80048 BASIC METABOLIC PNL TOTAL CA: CPT | Performed by: SURGERY

## 2018-05-09 PROCEDURE — 83735 ASSAY OF MAGNESIUM: CPT | Performed by: SURGERY

## 2018-05-09 PROCEDURE — 85025 COMPLETE CBC W/AUTO DIFF WBC: CPT | Performed by: SURGERY

## 2018-05-09 RX ORDER — DOCUSATE SODIUM 100 MG/1
100 CAPSULE, LIQUID FILLED ORAL 2 TIMES DAILY
Status: DISCONTINUED | OUTPATIENT
Start: 2018-05-09 | End: 2018-05-09 | Stop reason: HOSPADM

## 2018-05-09 RX ORDER — OXYCODONE HYDROCHLORIDE 5 MG/1
5 TABLET ORAL EVERY 6 HOURS PRN
Qty: 20 TABLET | Refills: 0 | Status: SHIPPED | OUTPATIENT
Start: 2018-05-09 | End: 2018-05-19

## 2018-05-09 RX ORDER — DOCUSATE SODIUM 100 MG/1
100 CAPSULE, LIQUID FILLED ORAL 2 TIMES DAILY
Qty: 10 CAPSULE | Refills: 0
Start: 2018-05-09 | End: 2021-03-23 | Stop reason: ALTCHOICE

## 2018-05-09 RX ORDER — POTASSIUM CHLORIDE 20 MEQ/1
20 TABLET, EXTENDED RELEASE ORAL ONCE
Status: COMPLETED | OUTPATIENT
Start: 2018-05-09 | End: 2018-05-09

## 2018-05-09 RX ORDER — ACETAMINOPHEN 325 MG/1
650 TABLET ORAL EVERY 6 HOURS PRN
Status: DISCONTINUED | OUTPATIENT
Start: 2018-05-09 | End: 2018-05-09 | Stop reason: HOSPADM

## 2018-05-09 RX ADMIN — POTASSIUM CHLORIDE 20 MEQ: 1500 TABLET, EXTENDED RELEASE ORAL at 10:15

## 2018-05-09 RX ADMIN — FLUTICASONE PROPIONATE 1 SPRAY: 50 SPRAY, METERED NASAL at 08:08

## 2018-05-09 RX ADMIN — OXYCODONE HYDROCHLORIDE 5 MG: 5 TABLET ORAL at 07:01

## 2018-05-09 RX ADMIN — OXYCODONE HYDROCHLORIDE 5 MG: 5 TABLET ORAL at 12:53

## 2018-05-09 RX ADMIN — HEPARIN SODIUM 5000 UNITS: 5000 INJECTION, SOLUTION INTRAVENOUS; SUBCUTANEOUS at 12:50

## 2018-05-09 RX ADMIN — ACETAMINOPHEN 650 MG: 325 TABLET, FILM COATED ORAL at 03:35

## 2018-05-09 RX ADMIN — ACETAMINOPHEN 650 MG: 325 TABLET, FILM COATED ORAL at 10:15

## 2018-05-09 RX ADMIN — OXYCODONE HYDROCHLORIDE 10 MG: 10 TABLET ORAL at 01:08

## 2018-05-09 RX ADMIN — DOCUSATE SODIUM 100 MG: 100 CAPSULE, LIQUID FILLED ORAL at 08:07

## 2018-05-09 RX ADMIN — HEPARIN SODIUM 5000 UNITS: 5000 INJECTION, SOLUTION INTRAVENOUS; SUBCUTANEOUS at 05:12

## 2018-05-09 NOTE — CASE MANAGEMENT
Initial Clinical Review    Admission: Date/Time/Statement: 5/8/18 @ 1441     Orders Placed This Encounter   Procedures    Inpatient Admission     Standing Status:   Standing     Number of Occurrences:   1     Order Specific Question:   Admitting Physician     Answer:   Terry Matthew     Order Specific Question:   Level of Care     Answer:   Med Surg [16]     Order Specific Question:   Estimated length of stay     Answer:   More than 2 Midnights     Order Specific Question:   Certification     Answer:   I certify that inpatient services are medically necessary for this patient for a duration of greater than two midnights  See H&P and MD Progress Notes for additional information about the patient's course of treatment  History of Illness: A 29 yr/old male direct admission to the hospital post elective procedure  Procedure: LAPAROSCOPIC HAND-ASSIST REPAIR OF RECTAL PROLAPSE BY SACRAL  [address]      ED Vital Signs:   ED Triage Vitals   Temperature Pulse Respirations Blood Pressure SpO2   05/08/18 0937 05/08/18 0937 05/08/18 0937 05/08/18 0937 05/08/18 0937   99 7 °F (37 6 °C) (!) 54 16 113/74 99 %      Temp Source Heart Rate Source Patient Position - Orthostatic VS BP Location FiO2 (%)   05/08/18 0937 05/08/18 0937 05/08/18 1545 05/08/18 1545 --   Tymp Core Monitor Lying Left arm       Pain Score       05/08/18 1415       Worst Possible Pain        Wt Readings from Last 1 Encounters:   05/08/18 90 7 kg (200 lb)     Physical Exam:   General: NAD, AAOx3  CV: RRR +S1/S2  Chest: breath sounds bilaterally  Abdomen: soft, mildly tender, non-distended, incisions c/d/i  Extremities: atraumatic, no edema      Past Medical/Surgical History:    Active Ambulatory Problems     Diagnosis Date Noted    No Active Ambulatory Problems     Resolved Ambulatory Problems     Diagnosis Date Noted    No Resolved Ambulatory Problems     Past Medical History:   Diagnosis Date    Asperger's syndrome     Rectal prolapse  Rectal prolapse 5/9/2018       Admitting Diagnosis: Rectal prolapse [K62 3]    Age/Sex: 29 y o  male    Assessment/Plan:      Assessment:  29 M with rectal prolapse s/p lap hand-assisted sacral proctopexy     Plan:  Regular diet  Saline lock  OOB and ambulate  PRN pain medication  SQH  Dispo planning     Admission Orders:  Inpatient/MedSurg  Post Op Care per Protocol  Scheduled Meds:   Current Facility-Administered Medications:  docusate sodium 100 mg Oral BID   fluticasone 1 spray Nasal Daily   heparin (porcine) 5,000 Units Subcutaneous Q8H [address]   nicotine 1 patch Transdermal Daily     IV Dilaudid 0 5 mg x 1 thus far  Percocet 10 mg x 2 thus far  Percocet 5 mg po x 1 thus far

## 2018-05-09 NOTE — PLAN OF CARE
DISCHARGE PLANNING     Discharge to home or other facility with appropriate resources Progressing        GASTROINTESTINAL - ADULT     Maintains or returns to baseline bowel function Progressing     Maintains adequate nutritional intake Progressing        INFECTION - ADULT     Absence or prevention of progression during hospitalization Progressing        Knowledge Deficit     Patient/family/caregiver demonstrates understanding of disease process, treatment plan, medications, and discharge instructions Progressing        PAIN - ADULT     Verbalizes/displays adequate comfort level or baseline comfort level Progressing

## 2018-05-09 NOTE — PROGRESS NOTES
Progress Note - Colorectal Surgery   Diannah Curling 29 y o  male MRN: [id number]  Unit/Bed#: [facility] 822-01 Encounter: [id number]    Assessment:  29 M with rectal prolapse s/p lap hand-assisted sacral proctopexy    Plan:  Regular diet  Saline lock  OOB and ambulate  PRN pain medication  SQH  Dispo planning    Subjective/Objective     Subjective: No acute events overnight  Some incisional pain  Tolerating clears, no nausea/vomiting  Passing gas, no bowel movement  Voiding spontaneously  Objective:    Blood pressure 134/79, pulse 95, temperature 98 5 °F (36 9 °C), temperature source Oral, resp  rate 20, height 6' (1 829 m), weight 90 7 kg (200 lb), SpO2 99 %  ,Body mass index is 27 12 kg/m²        Intake/Output Summary (Last 24 hours) at 05/09/18 0455  Last data filed at 05/09/18 0130   Gross per 24 hour   Intake             3215 ml   Output             1465 ml   Net             1750 ml       Invasive Devices     Peripheral Intravenous Line            Peripheral IV 05/08/18 Left Hand less than 1 day    Peripheral IV 05/08/18 Left Wrist less than 1 day                Physical Exam:   General: NAD, AAOx3  CV: RRR +S1/S2  Chest: breath sounds bilaterally  Abdomen: soft, mildly tender, non-distended, incisions c/d/i  Extremities: atraumatic, no edema

## 2018-05-09 NOTE — PLAN OF CARE
DISCHARGE PLANNING     Discharge to home or other facility with appropriate resources Completed        GASTROINTESTINAL - ADULT     Maintains or returns to baseline bowel function Completed     Maintains adequate nutritional intake Completed        INFECTION - ADULT     Absence or prevention of progression during hospitalization Completed        Knowledge Deficit     Patient/family/caregiver demonstrates understanding of disease process, treatment plan, medications, and discharge instructions Completed        PAIN - ADULT     Verbalizes/displays adequate comfort level or baseline comfort level Completed

## 2018-05-09 NOTE — DISCHARGE SUMMARY
Discharge Summary - Colorectal Surgery   Jewell Kennedy 29 y o  male MRN: [id number]  Unit/Bed#: [facility] 822-01 Encounter: [id number]        Admitting Diagnosis: Rectal prolapse    Admit Date: 5/8/18    Discharge Diagnosis: Same    Discharge Date: 5/9/18    HPI: This is a pleasant 28 yo gentleman admitted for surgical repair of his rectal prolapse  Procedures Performed: 5/8/18 Laparoscopic hand assisted proctopexy - Dr Reg Downing Course: Patient has done extremely well post operatively  He has very little pain, no nausea, no emesis  He is tolerating a house diet  He is ambulating the halls well  Patient will be discharged home today and followed with Dr Moi Estes in 2 weeks  A script for Oxycodone 5 mg was given to the patient, #20 dispensed and no refills      Complications: None      Condition at Discharge: good     Discharge instructions/Information to patient and family:   See after visit summary for information provided to patient and family  Provisions for Follow-Up Care:  See after visit summary for information related to follow-up care and any pertinent home health orders  Disposition: Home    Planned Readmission: No    Discharge Statement   I spent 30 minutes discharging the patient  This time was spent on the day of discharge  I had direct contact with the patient on the day of discharge  Additional documentation is required if more than 30 minutes were spent on discharge  Discharge Medications:  See after visit summary for reconciled discharge medications provided to patient and family

## 2018-05-09 NOTE — NURSING NOTE
Printed out information given to pt and pt's mother on newly prescribed medications; smoking cessation also given

## 2018-05-11 ENCOUNTER — TRANSCRIBE ORDERS (OUTPATIENT)
Dept: ADMINISTRATIVE | Facility: HOSPITAL | Age: 28
End: 2018-05-11

## 2018-05-11 ENCOUNTER — APPOINTMENT (OUTPATIENT)
Dept: LAB | Facility: HOSPITAL | Age: 28
End: 2018-05-11
Attending: COLON & RECTAL SURGERY
Payer: MEDICARE

## 2018-05-11 ENCOUNTER — TRANSCRIBE ORDERS (OUTPATIENT)
Dept: LAB | Facility: HOSPITAL | Age: 28
End: 2018-05-11

## 2018-05-11 ENCOUNTER — HOSPITAL ENCOUNTER (OUTPATIENT)
Dept: RADIOLOGY | Facility: HOSPITAL | Age: 28
Discharge: HOME | End: 2018-05-11
Attending: COLON & RECTAL SURGERY
Payer: MEDICARE

## 2018-05-11 DIAGNOSIS — K62.6 RECTAL ULCER: Primary | ICD-10-CM

## 2018-05-11 DIAGNOSIS — K62.6 RECTAL ULCER: ICD-10-CM

## 2018-05-11 DIAGNOSIS — K62.6 ULCER OF ANUS AND RECTUM: ICD-10-CM

## 2018-05-11 DIAGNOSIS — K62.6 ULCER OF ANUS AND RECTUM: Primary | ICD-10-CM

## 2018-05-11 LAB
ABO GROUP BLD BPU: NORMAL
ABO GROUP BLD BPU: NORMAL
BASOPHILS # BLD MANUAL: 0 THOUSAND/UL (ref 0–0.1)
BASOPHILS NFR MAR MANUAL: 0 % (ref 0–1)
BPU ID: NORMAL
BPU ID: NORMAL
EOSINOPHIL # BLD MANUAL: 0 THOUSAND/UL (ref 0–0.4)
EOSINOPHIL NFR BLD MANUAL: 0 % (ref 0–6)
ERYTHROCYTE [DISTWIDTH] IN BLOOD BY AUTOMATED COUNT: 12.9 % (ref 11.6–15.1)
HCT VFR BLD AUTO: 44.5 % (ref 36.5–49.3)
HGB BLD-MCNC: 15.3 G/DL (ref 12–17)
LYMPHOCYTES # BLD AUTO: 1.93 THOUSAND/UL (ref 0.6–4.47)
LYMPHOCYTES # BLD AUTO: 21 % (ref 14–44)
MCH RBC QN AUTO: 29.9 PG (ref 26.8–34.3)
MCHC RBC AUTO-ENTMCNC: 34.4 G/DL (ref 31.4–37.4)
MCV RBC AUTO: 87 FL (ref 82–98)
MONOCYTES # BLD AUTO: 0.55 THOUSAND/UL (ref 0–1.22)
MONOCYTES NFR BLD: 6 % (ref 4–12)
NEUTROPHILS # BLD MANUAL: 6.7 THOUSAND/UL (ref 1.85–7.62)
NEUTS SEG NFR BLD AUTO: 73 % (ref 43–75)
NRBC BLD AUTO-RTO: 2 /100 WBCS
PLATELET # BLD AUTO: 245 THOUSANDS/UL (ref 149–390)
PLATELET BLD QL SMEAR: ADEQUATE
PMV BLD AUTO: 11.6 FL (ref 8.9–12.7)
RBC # BLD AUTO: 5.12 MILLION/UL (ref 3.88–5.62)
TOTAL CELLS COUNTED SPEC: 100
UNIT DISPENSE STATUS: NORMAL
UNIT DISPENSE STATUS: NORMAL
UNIT PRODUCT CODE: NORMAL
UNIT PRODUCT CODE: NORMAL
UNIT RH: NORMAL
UNIT RH: NORMAL
WBC # BLD AUTO: 9.18 THOUSAND/UL (ref 4.31–10.16)

## 2018-05-11 PROCEDURE — 36415 COLL VENOUS BLD VENIPUNCTURE: CPT

## 2018-05-11 PROCEDURE — 85007 BL SMEAR W/DIFF WBC COUNT: CPT

## 2018-05-11 PROCEDURE — 72192 CT PELVIS W/O DYE: CPT

## 2018-05-11 PROCEDURE — 85027 COMPLETE CBC AUTOMATED: CPT

## 2018-06-19 ENCOUNTER — HOSPITAL ENCOUNTER (OUTPATIENT)
Facility: HOSPITAL | Age: 28
Setting detail: OBSERVATION
Discharge: HOME | End: 2018-06-19
Attending: EMERGENCY MEDICINE | Admitting: COLON & RECTAL SURGERY
Payer: MEDICARE

## 2018-06-19 ENCOUNTER — APPOINTMENT (EMERGENCY)
Dept: RADIOLOGY | Facility: HOSPITAL | Age: 28
End: 2018-06-19
Payer: MEDICARE

## 2018-06-19 ENCOUNTER — ANESTHESIA (OUTPATIENT)
Dept: GASTROENTEROLOGY | Facility: HOSPITAL | Age: 28
End: 2018-06-19
Payer: MEDICARE

## 2018-06-19 ENCOUNTER — ANESTHESIA EVENT (OUTPATIENT)
Dept: GASTROENTEROLOGY | Facility: HOSPITAL | Age: 28
End: 2018-06-19
Payer: MEDICARE

## 2018-06-19 VITALS
OXYGEN SATURATION: 96 % | DIASTOLIC BLOOD PRESSURE: 71 MMHG | WEIGHT: 198.41 LBS | BODY MASS INDEX: 26.87 KG/M2 | HEART RATE: 56 BPM | TEMPERATURE: 97.9 F | RESPIRATION RATE: 18 BRPM | HEIGHT: 72 IN | SYSTOLIC BLOOD PRESSURE: 109 MMHG

## 2018-06-19 DIAGNOSIS — R10.9 ABDOMINAL PAIN: ICD-10-CM

## 2018-06-19 DIAGNOSIS — K62.5 RECTAL BLEEDING: Primary | ICD-10-CM

## 2018-06-19 LAB
ALBUMIN SERPL BCP-MCNC: 3.7 G/DL (ref 3.5–5)
ALP SERPL-CCNC: 102 U/L (ref 46–116)
ALT SERPL W P-5'-P-CCNC: 21 U/L (ref 12–78)
ANION GAP SERPL CALCULATED.3IONS-SCNC: 5 MMOL/L (ref 4–13)
AST SERPL W P-5'-P-CCNC: 14 U/L (ref 5–45)
BASOPHILS # BLD AUTO: 0.05 THOUSANDS/ΜL (ref 0–0.1)
BASOPHILS NFR BLD AUTO: 1 % (ref 0–1)
BILIRUB SERPL-MCNC: 0.25 MG/DL (ref 0.2–1)
BUN SERPL-MCNC: 19 MG/DL (ref 5–25)
CALCIUM SERPL-MCNC: 8.9 MG/DL (ref 8.3–10.1)
CHLORIDE SERPL-SCNC: 106 MMOL/L (ref 100–108)
CO2 SERPL-SCNC: 29 MMOL/L (ref 21–32)
CREAT SERPL-MCNC: 0.91 MG/DL (ref 0.6–1.3)
EOSINOPHIL # BLD AUTO: 0.09 THOUSAND/ΜL (ref 0–0.61)
EOSINOPHIL NFR BLD AUTO: 1 % (ref 0–6)
ERYTHROCYTE [DISTWIDTH] IN BLOOD BY AUTOMATED COUNT: 12.8 % (ref 11.6–15.1)
GFR SERPL CREATININE-BSD FRML MDRD: 114 ML/MIN/1.73SQ M
GLUCOSE SERPL-MCNC: 90 MG/DL (ref 65–140)
HCT VFR BLD AUTO: 41.8 % (ref 36.5–49.3)
HGB BLD-MCNC: 13.9 G/DL (ref 12–17)
IMM GRANULOCYTES # BLD AUTO: 0.01 THOUSAND/UL (ref 0–0.2)
IMM GRANULOCYTES NFR BLD AUTO: 0 % (ref 0–2)
LYMPHOCYTES # BLD AUTO: 2.41 THOUSANDS/ΜL (ref 0.6–4.47)
LYMPHOCYTES NFR BLD AUTO: 31 % (ref 14–44)
MCH RBC QN AUTO: 28.7 PG (ref 26.8–34.3)
MCHC RBC AUTO-ENTMCNC: 33.3 G/DL (ref 31.4–37.4)
MCV RBC AUTO: 86 FL (ref 82–98)
MONOCYTES # BLD AUTO: 0.64 THOUSAND/ΜL (ref 0.17–1.22)
MONOCYTES NFR BLD AUTO: 8 % (ref 4–12)
NEUTROPHILS # BLD AUTO: 4.49 THOUSANDS/ΜL (ref 1.85–7.62)
NEUTS SEG NFR BLD AUTO: 59 % (ref 43–75)
NRBC BLD AUTO-RTO: 0 /100 WBCS
PLATELET # BLD AUTO: 201 THOUSANDS/UL (ref 149–390)
PMV BLD AUTO: 10.1 FL (ref 8.9–12.7)
POTASSIUM SERPL-SCNC: 3.6 MMOL/L (ref 3.5–5.3)
PROT SERPL-MCNC: 7 G/DL (ref 6.4–8.2)
RBC # BLD AUTO: 4.85 MILLION/UL (ref 3.88–5.62)
SODIUM SERPL-SCNC: 140 MMOL/L (ref 136–145)
WBC # BLD AUTO: 7.69 THOUSAND/UL (ref 4.31–10.16)

## 2018-06-19 PROCEDURE — 36415 COLL VENOUS BLD VENIPUNCTURE: CPT | Performed by: EMERGENCY MEDICINE

## 2018-06-19 PROCEDURE — 99285 EMERGENCY DEPT VISIT HI MDM: CPT

## 2018-06-19 PROCEDURE — 74177 CT ABD & PELVIS W/CONTRAST: CPT

## 2018-06-19 PROCEDURE — 80053 COMPREHEN METABOLIC PANEL: CPT | Performed by: EMERGENCY MEDICINE

## 2018-06-19 PROCEDURE — 85025 COMPLETE CBC W/AUTO DIFF WBC: CPT | Performed by: EMERGENCY MEDICINE

## 2018-06-19 PROCEDURE — 82272 OCCULT BLD FECES 1-3 TESTS: CPT

## 2018-06-19 RX ORDER — LIDOCAINE HYDROCHLORIDE 10 MG/ML
INJECTION, SOLUTION INFILTRATION; PERINEURAL AS NEEDED
Status: DISCONTINUED | OUTPATIENT
Start: 2018-06-19 | End: 2018-06-19 | Stop reason: SURG

## 2018-06-19 RX ORDER — SODIUM CHLORIDE 9 MG/ML
100 INJECTION, SOLUTION INTRAVENOUS CONTINUOUS
Status: DISCONTINUED | OUTPATIENT
Start: 2018-06-19 | End: 2018-06-19 | Stop reason: HOSPADM

## 2018-06-19 RX ORDER — PROPOFOL 10 MG/ML
INJECTION, EMULSION INTRAVENOUS CONTINUOUS PRN
Status: DISCONTINUED | OUTPATIENT
Start: 2018-06-19 | End: 2018-06-19 | Stop reason: SURG

## 2018-06-19 RX ORDER — POLYETHYLENE GLYCOL 3350 17 G/17G
17 POWDER, FOR SOLUTION ORAL ONCE
Status: COMPLETED | OUTPATIENT
Start: 2018-06-19 | End: 2018-06-19

## 2018-06-19 RX ORDER — MINERAL OIL 100 G/100G
1 OIL RECTAL EVERY 4 HOURS
Status: DISCONTINUED | OUTPATIENT
Start: 2018-06-19 | End: 2018-06-19 | Stop reason: HOSPADM

## 2018-06-19 RX ORDER — SODIUM CHLORIDE 9 MG/ML
INJECTION, SOLUTION INTRAVENOUS CONTINUOUS PRN
Status: DISCONTINUED | OUTPATIENT
Start: 2018-06-19 | End: 2018-06-19 | Stop reason: SURG

## 2018-06-19 RX ORDER — PROPOFOL 10 MG/ML
INJECTION, EMULSION INTRAVENOUS AS NEEDED
Status: DISCONTINUED | OUTPATIENT
Start: 2018-06-19 | End: 2018-06-19 | Stop reason: SURG

## 2018-06-19 RX ADMIN — PROPOFOL 100 MCG/KG/MIN: 10 INJECTION, EMULSION INTRAVENOUS at 16:40

## 2018-06-19 RX ADMIN — MINERAL OIL 1 ENEMA: 100 OIL RECTAL at 05:19

## 2018-06-19 RX ADMIN — MINERAL OIL 1 ENEMA: 100 OIL RECTAL at 09:24

## 2018-06-19 RX ADMIN — LIDOCAINE HYDROCHLORIDE 50 MG: 10 INJECTION, SOLUTION INFILTRATION; PERINEURAL at 16:40

## 2018-06-19 RX ADMIN — SODIUM CHLORIDE 100 ML/HR: 0.9 INJECTION, SOLUTION INTRAVENOUS at 06:00

## 2018-06-19 RX ADMIN — MINERAL OIL 1 ENEMA: 100 OIL RECTAL at 12:50

## 2018-06-19 RX ADMIN — IOHEXOL 100 ML: 350 INJECTION, SOLUTION INTRAVENOUS at 02:55

## 2018-06-19 RX ADMIN — POLYETHYLENE GLYCOL 3350 17 G: 17 POWDER, FOR SOLUTION ORAL at 05:18

## 2018-06-19 RX ADMIN — SODIUM CHLORIDE: 0.9 INJECTION, SOLUTION INTRAVENOUS at 16:24

## 2018-06-19 RX ADMIN — PROPOFOL 150 MG: 10 INJECTION, EMULSION INTRAVENOUS at 16:40

## 2018-06-19 NOTE — ANESTHESIA PREPROCEDURE EVALUATION
Review of Systems/Medical History  Patient summary reviewed  Chart reviewed      Cardiovascular  Negative cardio ROS    Pulmonary  Smoker (1/2 ppd, smoked 2 cigarettes this am) cigarette smoker  ,        GI/Hepatic    GERD ,   Comment: Rectal prolapse  Currently with rectal bleeding     Negative  ROS        Endo/Other  Negative endo/other ROS      GYN  Negative gynecology ROS          Hematology  Negative hematology ROS      Musculoskeletal  Negative musculoskeletal ROS        Neurology  Negative neurology ROS      Psychology       Comment: Asperger's syndrome         Physical Exam    Airway    Mallampati score: II  TM Distance: >3 FB       Dental   Comment: Crooked teeth,     Cardiovascular  Comment: Negative ROS, Rhythm: regular,     Pulmonary  Breath sounds clear to auscultation,     Other Findings        Anesthesia Plan  ASA Score- 2     Anesthesia Type- IV sedation with anesthesia with ASA Monitors  Additional Monitors:   Airway Plan:         Plan Factors-    Induction- intravenous  Postoperative Plan-     Informed Consent- Anesthetic plan and risks discussed with patient  I personally reviewed this patient with the CRNA  Discussed and agreed on the Anesthesia Plan with the REVA Tirado

## 2018-06-19 NOTE — ANESTHESIA POSTPROCEDURE EVALUATION
Post-Op Assessment Note      CV Status:  Stable    Mental Status:  Alert and awake    Hydration Status:  Euvolemic    PONV Controlled:  Controlled    Airway Patency:  Patent    Post Op Vitals Reviewed: Yes          Staff: CRNA           BP   112/56   Temp      Pulse  53   Resp   12   SpO2  100

## 2018-06-19 NOTE — H&P
H&P Exam - Colorectal Surgery   Godfrey Crouch 29 y o  male MRN: [id number]  Unit/Bed#: ED 05 Encounter: [id number]    Assessment/Plan     Assessment:  Patient is a 60-year-old male status post 05/08/2018 laparoscopic hand assisted proctopexy with complaints of rectal bleeding and Hemoccult positive stool    Plan:  Admit to colorectal surgery service  Monitor Hg and abdominal exam  Monitor BMs-mineral oil enemas  Plan for scope today-possible flex sig  SCDs    History of Present Illness     HPI:  Godfrey Crouch is a 29 y o  male who presents with status post 05/08/2018 laparoscopic hand assisted proctopexy with complaints of rectal bleeding and Hemoccult positive stool  Patient has had two days of rectal bleeding and new onset lower abdominal pain, the blood is dark red, and there is rectal burning  He says blood stains his underwear  There is bilateral lower quadrant abdominal pain  Hg is stable at 13 9  CT AP shows no acute findings-patient has a large amount of stool in his colon  Patient says he has also noticed a bulging when he has a BM, much like his previous prolapse, no prolapse appreciated on exam  He has no sick contacts, no N/V  Review of Systems   Constitutional: Positive for appetite change  Negative for activity change, diaphoresis, fever and unexpected weight change  HENT: Negative  Eyes: Negative  Respiratory: Negative for chest tightness and shortness of breath  Cardiovascular: Negative for chest pain  Gastrointestinal: Positive for abdominal pain, anal bleeding and diarrhea  Negative for abdominal distention, constipation, nausea and vomiting  Endocrine: Negative  Genitourinary: Negative  Musculoskeletal: Negative  Skin: Negative  Neurological: Negative  Psychiatric/Behavioral: Negative          Historical Information   Past Medical History:   Diagnosis Date    Asperger's syndrome     Rectal prolapse     Rectal prolapse 5/9/2018     Past Surgical History:   Procedure Laterality Date    HERNIA REPAIR      MT COLONOSCOPY FLX DX W/COLLJ SPEC WHEN PFRMD N/A 5/7/2018    Procedure: COLONOSCOPY;  Surgeon: Love Wang MD;  [facility]; Service: Colorectal    MT LAP, SURG PROCTOPEXY W/SIG RESECT N/A 5/8/2018    Procedure: LAPAROSCOPIC HAND-ASSIST REPAIR OF RECTAL PROLAPSE BY SACRAL  PROTOPEXY;  Surgeon: Love Wang MD;  [facility];  Service: Colorectal    SURGERY SCROTAL / TESTICULAR       Social History   History   Alcohol Use No     History   Drug Use No     History   Smoking Status    Current Every Day Smoker    Packs/day: 0 50   Smokeless Tobacco    Never Used     Family History: non-contributory    Meds/Allergies   all medications and allergies reviewed  No Known Allergies    Objective   First Vitals:   Blood Pressure: 132/96 (06/19/18 0048)  Pulse: (!) 109 (06/19/18 0048)  Temperature: 99 5 °F (37 5 °C) (06/19/18 0048)  Temp Source: Tympanic (06/19/18 0048)  Respirations: 18 (06/19/18 0048)  Weight - Scale: 90 7 kg (200 lb) (06/19/18 0048)  SpO2: 98 % (06/19/18 0048)    Current Vitals:   Blood Pressure: 120/59 (06/19/18 0301)  Pulse: 67 (06/19/18 0301)  Temperature: 99 5 °F (37 5 °C) (06/19/18 0048)  Temp Source: Tympanic (06/19/18 0048)  Respirations: 18 (06/19/18 0301)  Weight - Scale: 90 7 kg (200 lb) (06/19/18 0048)  SpO2: 97 % (06/19/18 0301)    No intake or output data in the 24 hours ending 06/19/18 0342    Invasive Devices          No matching active lines, drains, or airways          Physical Exam   Constitutional: He is oriented to person, place, and time  He appears well-developed and well-nourished  HENT:   Head: Normocephalic and atraumatic  Eyes: Pupils are equal, round, and reactive to light  Cardiovascular: Normal rate  Pulmonary/Chest: Effort normal  No respiratory distress  Abdominal: Soft  He exhibits no distension  There is tenderness     Mild RLQ/LLQ abdominal pain   Musculoskeletal: He exhibits no edema  Neurological: He is alert and oriented to person, place, and time  Skin: Skin is warm and dry  Psychiatric: He has a normal mood and affect  Lab Results:   I have personally reviewed pertinent lab results  , CBC:   Lab Results   Component Value Date    WBC 7 69 06/19/2018    HGB 13 9 06/19/2018    HCT 41 8 06/19/2018    MCV 86 06/19/2018     06/19/2018    MCH 28 7 06/19/2018    MCHC 33 3 06/19/2018    RDW 12 8 06/19/2018    MPV 10 1 06/19/2018    NRBC 0 06/19/2018   , CMP:   Lab Results   Component Value Date     06/19/2018    K 3 6 06/19/2018     06/19/2018    CO2 29 06/19/2018    ANIONGAP 5 06/19/2018    BUN 19 06/19/2018    CREATININE 0 91 06/19/2018    GLUCOSE 90 06/19/2018    CALCIUM 8 9 06/19/2018    AST 14 06/19/2018    ALT 21 06/19/2018    ALKPHOS 102 06/19/2018    PROT 7 0 06/19/2018    BILITOT 0 25 06/19/2018    EGFR 114 06/19/2018     Imaging: I have personally reviewed pertinent reports  EKG, Pathology, and Other Studies: I have personally reviewed pertinent reports        Code Status: Prior  Advance Directive and Living Will:      Power of :    POLST:

## 2018-06-19 NOTE — OP NOTE
**** GI/ENDOSCOPY REPORT ****     PATIENT NAME: Jaylen First - VISIT ID:  Patient ID: [id number]   YOB: 1990     INTRODUCTION: Sigmoidoscopy - A 29 male patient presents for an outpatient   Sigmoidoscopy at [address] [place] [address]  PREVIOUS COLONOSCOPY:     INDICATIONS: Rectal bleeding  CONSENT: The benefits, risks, and alternatives to the procedure were   discussed and informed consent was obtained from the patient  PREPARATION:  EKG, pulse, pulse oximetry and blood pressure were monitored   throughout the procedure  MEDICATIONS: Anesthesia-check records     RECTAL EXAM: Normal rectal exam  Normal sphincter tone  No internal   hemorrhoids  No external hemorrhoids  PROCEDURE:  The endoscope was passed with ease through the anus under   direct visualization and advanced to the sigmoid colon  The scope was   withdrawn and the mucosa was carefully examined  FINDINGS:  The sigmoidoscopy examination was completely normal  Large   amount of fecal solid material seen with no blood, inspite of multiple   enemas applications  No endoscopic signs of rectal prolapse seen  COMPLICATIONS: There were no complications  IMPRESSIONS: Normal sigmoidoscopy  Large amount of fecal solid material   seen with no blood, inspite of multiple enemas applications  No endoscopic   signs of rectal prolapse seen  RECOMMENDATIONS: High fiber diet with fruits at least 5 daily  Resume   regular diet as tolerated  Discharge home when standard parameters are met  ESTIMATED BLOOD LOSS:     PATHOLOGY SPECIMENS:     PROCEDURE CODES: 63279 - flexible sigmoidoscopy     ICD-9 Codes: 569 3 Hemorrhage of rectum and anus     ICD-10 Codes: K62 5 Hemorrhage of anus and rectum     PERFORMED BY: ZOILA Pepper  on 06/19/2018  Version 1, electronically signed by ZOILA Pepper  on   06/19/2018 at 16:57

## 2018-06-19 NOTE — ED PROVIDER NOTES
History  Chief Complaint   Patient presents with    Rectal Bleeding     pt had rectal prolapse surg by Dr Germná Martin 1 month ago and feels that he is prolapsing again and having rectal burning, pt reports rectal bleeding, mild headache and dizziness     29year old male presents for evaluation of rectal bleeding for the past 2 days with onset of lower abdominal pain today  Patient states that he has noticed a large amount of dark red blood in the toilet after bowel movements with rectal burning pain as well a blood on the toilet paper when wiping  Abdominal pain effects the lower abdomen bilaterally with no exacerbating or alleviating factors  He states that he has had this pain previously, but that with prior episode, the pain improved with bowel movements  Patient had undergone fixation of a rectal prolapse with Dr Agapito Dawn 1 month ago  He called the office and was advised to come to the emergency department for further evaluation  History provided by:  Patient  Rectal Bleeding - Minor   Quality:  Bright red  Amount:  Copious  Duration:  2 days  Timing:  Intermittent  Chronicity:  Recurrent  Context: rectal pain    Pain details:     Quality:  Burning    Severity:  Moderate    Duration:  2 days    Timing: Only with defecation    Progression:  Unchanged  Similar prior episodes: yes    Relieved by:  None tried  Worsened by:  Defecation and wiping  Ineffective treatments:  None tried  Associated symptoms: abdominal pain    Associated symptoms: no fever and no vomiting    Abdominal pain:     Location:  LLQ and RLQ    Quality: aching      Severity:  Moderate    Onset quality:  Gradual    Duration:  1 day    Timing:  Constant    Progression:  Worsening    Chronicity:  New  Risk factors: hx of colorectal surgery        Prior to Admission Medications   Prescriptions Last Dose Informant Patient Reported?  Taking?   docusate sodium (COLACE) 100 mg capsule 6/18/2018 at Unknown time  No Yes   Sig: Take 1 capsule (100 mg total) by mouth 2 (two) times a day   fluticasone (FLONASE) 50 mcg/act nasal spray 2018 at Unknown time  Yes Yes   Si spray into each nostril daily      Facility-Administered Medications: None       Past Medical History:   Diagnosis Date    Asperger's syndrome     Rectal prolapse     Rectal prolapse 2018       Past Surgical History:   Procedure Laterality Date    HERNIA REPAIR      MN COLONOSCOPY FLX DX W/COLLJ SPEC WHEN PFRMD N/A 2018    Procedure: COLONOSCOPY;  Surgeon: Jasiel Garcia MD;  [facility]; Service: Colorectal    MN LAP, SURG PROCTOPEXY W/SIG RESECT N/A 2018    Procedure: LAPAROSCOPIC HAND-ASSIST REPAIR OF RECTAL PROLAPSE BY SACRAL  PROTOPEXY;  Surgeon: Jasiel Garcia MD;  [facility];  Service: Colorectal    SURGERY SCROTAL / TESTICULAR         History reviewed  No pertinent family history  I have reviewed and agree with the history as documented  Social History   Substance Use Topics    Smoking status: Current Every Day Smoker     Packs/day: 0 50    Smokeless tobacco: Never Used    Alcohol use No        Review of Systems   Constitutional: Negative for appetite change, diaphoresis, fatigue and fever  HENT: Positive for congestion (nasal)  Negative for rhinorrhea and sore throat  Respiratory: Negative for cough, chest tightness and shortness of breath  Cardiovascular: Negative for chest pain, palpitations and leg swelling  Gastrointestinal: Positive for abdominal pain, anal bleeding, hematochezia and rectal pain  Negative for constipation, diarrhea, nausea and vomiting  Genitourinary: Negative for difficulty urinating, dysuria, frequency and hematuria  Musculoskeletal: Negative for myalgias, neck pain and neck stiffness  Skin: Negative for pallor  Neurological: Positive for headaches (mild, bilateral, frontal, associated with sinus pressure/congestion)  Negative for syncope and weakness     All other systems reviewed and are negative  Physical Exam  ED Triage Vitals [06/19/18 0048]   Temperature Pulse Respirations Blood Pressure SpO2   99 5 °F (37 5 °C) (!) 109 18 132/96 98 %      Temp Source Heart Rate Source Patient Position - Orthostatic VS BP Location FiO2 (%)   Tympanic Monitor Sitting Left arm --      Pain Score       5           Orthostatic Vital Signs  Vitals:    06/19/18 0048 06/19/18 0301   BP: 132/96 120/59   Pulse: (!) 109 67   Patient Position - Orthostatic VS: Sitting Lying       Physical Exam   Constitutional: He appears well-developed and well-nourished  Non-toxic appearance  No distress  HENT:   Head: Normocephalic and atraumatic  Eyes: EOM are normal  Pupils are equal, round, and reactive to light  Neck: Normal range of motion  No tracheal deviation present  No thyromegaly present  Cardiovascular: Normal rate, regular rhythm, normal heart sounds and intact distal pulses  Pulmonary/Chest: Effort normal and breath sounds normal    Abdominal: Soft  Bowel sounds are normal  He exhibits no distension  There is tenderness in the right lower quadrant, suprapubic area and left lower quadrant  There is no rigidity, no rebound, no guarding, no CVA tenderness, no tenderness at McBurney's point and negative Cedillo's sign  No external hemorrhoids of fissures on exam  No signs of prolapse   Genitourinary: Rectal exam shows guaiac positive stool  Lymphadenopathy:     He has no cervical adenopathy  Skin: Skin is warm and dry  He is not diaphoretic  Psychiatric: He has a normal mood and affect  His behavior is normal  Judgment and thought content normal    Nursing note and vitals reviewed        ED Medications  Medications   polyethylene glycol (MIRALAX) packet 17 g (not administered)   sodium chloride 0 9 % infusion (not administered)   mineral oil enema 1 enema (not administered)   iohexol (OMNIPAQUE) 350 MG/ML injection (MULTI-DOSE) 100 mL (100 mL Intravenous Given 6/19/18 0255) Diagnostic Studies  Results Reviewed     Procedure Component Value Units Date/Time    Comprehensive metabolic panel [[id number]] Collected:  06/19/18 0155    Lab Status:  Final result Specimen:  Blood from Arm, Left Updated:  06/19/18 0220     Sodium 140 mmol/L      Potassium 3 6 mmol/L      Chloride 106 mmol/L      CO2 29 mmol/L      Anion Gap 5 mmol/L      BUN 19 mg/dL      Creatinine 0 91 mg/dL      Glucose 90 mg/dL      Calcium 8 9 mg/dL      AST 14 U/L      ALT 21 U/L      Alkaline Phosphatase 102 U/L      Total Protein 7 0 g/dL      Albumin 3 7 g/dL      Total Bilirubin 0 25 mg/dL      eGFR 114 ml/min/1 73sq m     Narrative:         National Kidney Disease Education Program recommendations are as follows:  GFR calculation is accurate only with a steady state creatinine  Chronic Kidney disease less than 60 ml/min/1 73 sq  meters  Kidney failure less than 15 ml/min/1 73 sq  meters      CBC and differential [[id number]] Collected:  06/19/18 0155    Lab Status:  Final result Specimen:  Blood from Arm, Left Updated:  06/19/18 0202     WBC 7 69 Thousand/uL      RBC 4 85 Million/uL      Hemoglobin 13 9 g/dL      Hematocrit 41 8 %      MCV 86 fL      MCH 28 7 pg      MCHC 33 3 g/dL      RDW 12 8 %      MPV 10 1 fL      Platelets 901 Thousands/uL      nRBC 0 /100 WBCs      Neutrophils Relative 59 %      Immat GRANS % 0 %      Lymphocytes Relative 31 %      Monocytes Relative 8 %      Eosinophils Relative 1 %      Basophils Relative 1 %      Neutrophils Absolute 4 49 Thousands/µL      Immature Grans Absolute 0 01 Thousand/uL      Lymphocytes Absolute 2 41 Thousands/µL      Monocytes Absolute 0 64 Thousand/µL      Eosinophils Absolute 0 09 Thousand/µL      Basophils Absolute 0 05 Thousands/µL                  CT abdomen pelvis with contrast   Final Result by Johny Driver MD (06/19 5556)      No acute pathology visualized in CT of the abdomen and pelvis with IV without oral contrast   Previously seen subcutaneous, interfascial, and intra-abdominal postsurgical free air is resolved  No intra-abdominal fluid collection to suggest an abscess  No bowel obstruction  Workstation performed: [id number]               Procedures  Procedures      Phone Consults  ED Phone Contact    ED Course                               MDM  Number of Diagnoses or Management Options  Abdominal pain: new and requires workup  Rectal bleeding: new and requires workup  Diagnosis management comments: 29year old male presents with abdominal pain and rectal bleeding with recent rectal prolapse repair  Labs unremarkable  CT unremarkable  Colorectal surgery consulted  Patient admitted for further evaluation and management  Amount and/or Complexity of Data Reviewed  Clinical lab tests: ordered and reviewed  Tests in the radiology section of CPT®: ordered and reviewed    Patient Progress  Patient progress: stable    CritCare Time    Disposition  Final diagnoses:   Rectal bleeding   Abdominal pain     Time reflects when diagnosis was documented in both MDM as applicable and the Disposition within this note     Time User Action Codes Description Comment    6/19/2018  4:29 AM Clementina Bell Add [K62 5] Rectal bleeding     6/19/2018  4:30 AM Cristine Kym Add [R10 9] Abdominal pain       ED Disposition     ED Disposition Condition Comment    Admit  Case was discussed with Colorectal Surgery and the patient's admission status was agreed to be Admission Status: observation status to the service of Dr Ilan Tomas   Follow-up Information    None         Patient's Medications   Discharge Prescriptions    No medications on file     No discharge procedures on file  ED Provider  Attending physically available and evaluated Medhat Lapine  I managed the patient along with the ED Attending      Electronically Signed by         Renee Bridges MD  06/19/18 4771

## 2018-06-19 NOTE — CASE MANAGEMENT
[address] ADMISSION 5/8/18 - 5/9/18      Initial Clinical Review    Admission: Date/Time/Statement:   6/19/18 AT 0410 OBSERVATION    Orders Placed This Encounter   Procedures    Place in Observation     Standing Status:   Standing     Number of Occurrences:   1     Order Specific Question:   Admitting Physician     Answer:   Brandon Enrique     Order Specific Question:   Level of Care     Answer:   Med Surg [16]       ED: Date/Time/Mode of Arrival:   ED Arrival Information     Expected Arrival Acuity Means of Arrival Escorted By Service Admission Type    - 6/19/2018 00:45 Urgent Walk-In Self Colorectal Urgent    Arrival Complaint    Rectal Bleeding          Chief Complaint:   Chief Complaint   Patient presents with    Rectal Bleeding     pt had rectal prolapse surg by Dr Imtiza Wyatt 1 month ago and feels that he is prolapsing again and having rectal burning, pt reports rectal bleeding, mild headache and dizziness       History of Illness:  Javi Ortiz is a 29 y o  male who presents with status post 05/08/2018 laparoscopic hand assisted proctopexy with complaints of rectal bleeding and Hemoccult positive stool  Patient has had two days of rectal bleeding and new onset lower abdominal pain, the blood is dark red, and there is rectal burning  He says blood stains his underwear  There is bilateral lower quadrant abdominal pain  Hg is stable at 13 9  CT AP shows no acute findings-patient has a large amount of stool in his colon  Patient says he has also noticed a bulging when he has a BM, much like his previous prolapse, no prolapse appreciated on exam  He has no sick contacts, no N/V          Review of Systems   Constitutional: Positive for appetite change  Negative for activity change, diaphoresis, fever and unexpected weight change  Respiratory: Negative for chest tightness and shortness of breath  Cardiovascular: Negative for chest pain     Gastrointestinal: Positive for abdominal pain, anal bleeding and diarrhea  Negative for abdominal distention, constipation, nausea and vomiting  Physical Exam   Constitutional: He is oriented to person, place, and time  He appears well-developed and well-nourished  Cardiovascular: Normal rate  Pulmonary/Chest: Effort normal  No respiratory distress  Abdominal: Soft  He exhibits no distension  There is tenderness     Mild RLQ/LLQ abdominal pain       ED Vital Signs:   ED Triage Vitals [06/19/18 0048]   Temperature Pulse Respirations Blood Pressure SpO2   99 5 °F (37 5 °C) (!) 109 18 132/96 98 %      Temp Source Heart Rate Source Patient Position - Orthostatic VS BP Location FiO2 (%)   Tympanic Monitor Sitting Left arm --      Pain Score       5        Wt Readings from Last 1 Encounters:   06/19/18 90 kg (198 lb 6 6 oz)      06/18 0701  06/19 0700 06/19 0701  06/19 1050  Most Recent    Temperature (°F) 99 5 98 5  98 5 (36 9)    Pulse  52-57  57    Respirations 18 16-18  18    Blood Pressure 120//96 99//65  106/65    SpO2 (%) 97-98 97-98  98        LABS/Diagnostic Test Results:     CBC  Component Value Date     WBC 7 69 06/19/2018     HGB 13 9 06/19/2018     HCT 41 8 06/19/2018     MCV 86 06/19/2018      06/19/2018     MCH 28 7 06/19/2018     MCHC 33 3 06/19/2018     RDW 12 8 06/19/2018     MPV 10 1 06/19/2018     NRBC 0 06/19/2018     CMP:         Lab Results   Component Value Date      06/19/2018     K 3 6 06/19/2018      06/19/2018     CO2 29 06/19/2018     ANIONGAP 5 06/19/2018     BUN 19 06/19/2018     CREATININE 0 91 06/19/2018     GLUCOSE 90 06/19/2018     CALCIUM 8 9 06/19/2018     AST 14 06/19/2018     ALT 21 06/19/2018     ALKPHOS 102 06/19/2018     PROT 7 0 06/19/2018     BILITOT 0 25 06/19/2018     EGFR 114 06/19/2018        ED Treatment:   Medication Administration from 06/19/2018 0045 to 06/19/2018 0670       Date/Time Order Dose Route Action Action by Comments     06/19/2018 0255 iohexol (OMNIPAQUE) 350 MG/ML injection (MULTI-DOSE) 100 mL 100 mL Intravenous Given Ector Wallace      06/19/2018 0518 polyethylene glycol (MIRALAX) packet 17 g 17 g Oral Given Cj Villalobos RN      06/19/2018 0600 sodium chloride 0 9 % infusion 100 mL/hr Intravenous [facility], [address] [place] [address]      06/19/2018 0041 mineral oil enema 1 enema 1 enema Rectal Given Cj Villalobos RN           Past Medical/Surgical History:    Active Ambulatory Problems     Diagnosis Date Noted    Rectal prolapse 05/09/2018     Resolved Ambulatory Problems     Diagnosis Date Noted    No Resolved Ambulatory Problems     Past Medical History:   Diagnosis Date    Asperger's syndrome     Rectal prolapse     Rectal prolapse 5/9/2018       Admitting Diagnosis: Rectal bleeding [K62 5]  Abdominal pain [R10 9]    Age/Sex: 29 y o  male      Assessment/Plan:   Assessment:  Patient is a 20-year-old male status post 05/08/2018 laparoscopic hand assisted proctopexy with complaints of rectal bleeding and Hemoccult positive stool     Plan:  Admit to colorectal surgery service  Monitor Hg and abdominal exam  Monitor BMs-mineral oil enemas  Plan for scope today-possible flex sig  SCDs      Admission Orders:  6/19/18 AT 0410 OBSERVATION  TELEMETRY  VS Q4HRS    Up + OOB as Tolerated  SCD    NPO    Continuous IV Infusions:   sodium chloride 100 mL/hr Last Rate: 100 mL/hr (06/19/18 0600)       Scheduled Meds:   Current Facility-Administered Medications:  mineral oil 1 enema Rectal Q4H Klarissa Cedillo MD    sodium chloride 100 mL/hr Intravenous Continuous Klarissa Cedillo MD Last Rate: 100 mL/hr (06/19/18 0600)       PRN Meds:

## 2018-06-28 NOTE — ED ATTENDING ATTESTATION
Jamelle Kayser, MD, saw and evaluated the patient  I have discussed the patient with the resident/non-physician practitioner and agree with the resident's/non-physician practitioner's findings, Plan of Care, and MDM as documented in the resident's/non-physician practitioner's note, except where noted  All available labs and Radiology studies were reviewed  At this point I agree with the current assessment done in the Emergency Department  I have conducted an independent evaluation of this patient a history and physical is as follows:    Patient presents for evaluation of 2 days of lower abdominal pain  Additionally, patient noticed having some dark red blood in the toilet after bowel movements  Patient had a rectal prolapse 1 month ago by Dr Dwight Seay  No additional complaints  Exam: AAOx3, NAD, RRR, CTA, mild suprapubic abdominal tenderness  A/P:  Abdominal pain, rectal bleeding    Given recent surgery, will CT abdomen pelvis and discuss with Colorectal     Critical Care Time  CritCare Time    Procedures

## 2018-07-07 ENCOUNTER — HOSPITAL ENCOUNTER (EMERGENCY)
Facility: HOSPITAL | Age: 28
Discharge: HOME | End: 2018-07-07
Attending: EMERGENCY MEDICINE | Admitting: EMERGENCY MEDICINE
Payer: MEDICARE

## 2018-07-07 VITALS
OXYGEN SATURATION: 99 % | RESPIRATION RATE: 18 BRPM | TEMPERATURE: 99.4 F | DIASTOLIC BLOOD PRESSURE: 78 MMHG | HEART RATE: 120 BPM | SYSTOLIC BLOOD PRESSURE: 133 MMHG

## 2018-07-07 DIAGNOSIS — F43.0 STRESS REACTION: Primary | ICD-10-CM

## 2018-07-07 PROCEDURE — 99284 EMERGENCY DEPT VISIT MOD MDM: CPT

## 2018-07-07 NOTE — ED NOTES
Patient just admitted  Changed into hospital attire & is now quietly sitting in the chair  Will continue to monitor       April Vanita  07/07/18 0048

## 2018-07-07 NOTE — DISCHARGE INSTRUCTIONS
Stress   WHAT YOU NEED TO KNOW:   Stress is a feeling of tension or strain related to the events and pressures of everyday life  Learn to cope and control your stress to help you function in a healthy way  DISCHARGE INSTRUCTIONS:   Call 911 for any of the following:   · You feel like hurting yourself or someone else  · You feel you are overwhelmed and can no longer handle things by yourself  Contact your healthcare provider if:   · You have trouble coping with your stress  · Your symptoms cause problems in your relationships  · You feel depressed  · You have trouble controlling your anger  · You have started to use alcohol, illegal drugs, or prescription medicines, or you increase your current use  · You have questions or concerns about your condition or care  Ways to manage your stress:  Learn what causes you stress  Not all stress can be avoided  Instead, change how you cope with stress by doing any of the following:  · Learn relaxation techniques, such as yoga, meditation, or listening to music  Take at least 30 minutes a day to do something you enjoy  This may include taking a bath or reading a book  · Do deep breathing exercises during times of increased stress  Sit up straight and take a slow, deep breath in through your nose  Then breathe out slowly through your mouth  Take twice as long to breathe out as you do when you breathe in  Repeat this a few times until you feel calmer or more focused  · Set realistic goals for yourself  Make a list of tasks and prioritize them  Focus on one task at a time  · Talk to someone about things that upset you  Talk to a trusted friend, family member, or support group  Try to stop yourself when you think negative, angry, or discouraging thoughts  · Take time to exercise  Start slowly, such as walking 1 to 2 blocks each day  Stretch and relax your muscles often  Ask about the best exercise plan for you       · Eat a variety of healthy foods   Healthy foods include fruits, vegetables, whole-grain breads, low-fat dairy products, beans, lean meats, and fish  Follow up with your healthcare provider as directed:  Write down your questions so you remember to ask them during your visits  © 2017 [address] Information is for End User's use only and may not be sold, redistributed or otherwise used for commercial purposes  All illustrations and images included in CareNotes® are the copyrighted property of A D A M , Inc  or [employer]  The above information is an  only  It is not intended as medical advice for individual conditions or treatments  Talk to your doctor, nurse or pharmacist before following any medical regimen to see if it is safe and effective for you

## 2018-07-07 NOTE — ED PROVIDER NOTES
History  Chief Complaint   Patient presents with    Depression     pt presents with c/o of increased depression, states he felt he was on the verge of a mental breakdown but is feeling better  was just got by  in a car with a 16 yom, thought he was 25  denies SI, denies HI  This 51-year-old male presents today with police for psychiatric evaluation  Patient was apparently in a car with a male who he thought was 25, but upon police arrival was informed this was actually a 16year-old  Patient is not under arrest   Patient however then stated he was having a bit of a nervous breakdown because of this  Patient states he struggles with his homosexuality, also today is a four year anniversary of his father's death  Patient denies any homicidal or suicidal ideations  Patient does have a history of Asperger's and does not handle stress well  History provided by:  Patient   used: No    Psychiatric Evaluation   Presenting symptoms: no agitation, no hallucinations, no self-mutilation and no suicidal thoughts    Presenting symptoms comment:  Stress and anxiety  Patient accompanied by:  Law enforcement  Degree of incapacity (severity):  Mild  Onset quality:  Sudden  Progression:  Partially resolved  Chronicity:  Recurrent  Context: stressful life event    Treatment compliance: All of the time  Relieved by:  None tried  Ineffective treatments:  None tried  Associated symptoms: anxiety    Associated symptoms: no abdominal pain, no appetite change, no chest pain and no headaches    Risk factors: hx of mental illness        Prior to Admission Medications   Prescriptions Last Dose Informant Patient Reported?  Taking?   docusate sodium (COLACE) 100 mg capsule   No No   Sig: Take 1 capsule (100 mg total) by mouth 2 (two) times a day   fluticasone (FLONASE) 50 mcg/act nasal spray   Yes No   Si spray into each nostril daily      Facility-Administered Medications: None       Past Medical History:   Diagnosis Date    Asperger's syndrome     GERD (gastroesophageal reflux disease)     Rectal prolapse     Rectal prolapse 5/9/2018       Past Surgical History:   Procedure Laterality Date    HERNIA REPAIR      OK COLONOSCOPY FLX DX W/COLLJ SPEC WHEN PFRMD N/A 5/7/2018    Procedure: COLONOSCOPY;  Surgeon: Cristina Duarte MD;  [facility]; Service: Colorectal    OK LAP, SURG PROCTOPEXY W/SIG RESECT N/A 5/8/2018    Procedure: LAPAROSCOPIC HAND-ASSIST REPAIR OF RECTAL PROLAPSE BY Ayesha Norman;  Surgeon: Cristina Duarte MD;  [facility];  Service: Colorectal    OK SIGMOIDOSCOPY FLX DX W/COLLJ SPEC BR/WA IF PFRMD N/A 6/19/2018    Procedure: Dunia Ferrell;  Surgeon: Cristina Duarte MD;  [facility]; Service: Colorectal    SURGERY SCROTAL / TESTICULAR         History reviewed  No pertinent family history  I have reviewed and agree with the history as documented  Social History   Substance Use Topics    Smoking status: Current Every Day Smoker     Packs/day: 0 50    Smokeless tobacco: Never Used    Alcohol use No        Review of Systems   Constitutional: Negative for activity change, appetite change, diaphoresis and fever  Respiratory: Negative for cough, chest tightness, shortness of breath and wheezing  Cardiovascular: Negative for chest pain, palpitations and leg swelling  Gastrointestinal: Negative for abdominal distention, abdominal pain, constipation, diarrhea, nausea and vomiting  Genitourinary: Negative for difficulty urinating, dysuria, flank pain, hematuria and urgency  Musculoskeletal: Negative for back pain, gait problem and neck pain  Skin: Negative for rash and wound  Neurological: Negative for dizziness, syncope, speech difficulty, weakness and headaches  Psychiatric/Behavioral: Negative for agitation, behavioral problems, confusion, hallucinations, self-injury and suicidal ideas  The patient is nervous/anxious  Physical Exam  Physical Exam   Constitutional: He is oriented to person, place, and time  He appears well-developed and well-nourished  No distress  HENT:   Head: Normocephalic and atraumatic  Eyes: Conjunctivae are normal  Pupils are equal, round, and reactive to light  Cardiovascular: Normal rate and regular rhythm  No murmur heard  Pulmonary/Chest: Effort normal and breath sounds normal  He has no wheezes  Abdominal: Soft  There is no tenderness  Neurological: He is alert and oriented to person, place, and time  No sensory deficit  He exhibits normal muscle tone  Skin: Skin is warm and dry  No erythema  Psychiatric: He has a normal mood and affect  His speech is normal and behavior is normal  Thought content normal  He expresses impulsivity  Vitals reviewed  Vital Signs  ED Triage Vitals [07/07/18 0053]   Temperature Pulse Respirations Blood Pressure SpO2   99 4 °F (37 4 °C) (!) 120 18 133/78 99 %      Temp Source Heart Rate Source Patient Position - Orthostatic VS BP Location FiO2 (%)   Oral Monitor Sitting -- --      Pain Score       No Pain           Vitals:    07/07/18 0053   BP: 133/78   Pulse: (!) 120   Patient Position - Orthostatic VS: Sitting       Visual Acuity      ED Medications  Medications - No data to display    Diagnostic Studies  Results Reviewed     None                 No orders to display              Procedures  Procedures       Phone Contacts  ED Phone Contact    ED Course                               MDM  Number of Diagnoses or Management Options  Stress reaction: new and does not require workup  Diagnosis management comments: Patient with no acute inpatient psychiatric needs  Patient's mother is here  Patient is calm, requesting to be discharged      Risk of Complications, Morbidity, and/or Mortality  Presenting problems: moderate  Diagnostic procedures: low  Management options: low    Patient Progress  Patient progress: stable    CritCare Time    Disposition  Final diagnoses:   Stress reaction     Time reflects when diagnosis was documented in both MDM as applicable and the Disposition within this note     Time User Action Codes Description Comment    7/7/2018  1:18 AM Jeane Villa [F43 0] Stress reaction       ED Disposition     ED Disposition Condition Comment    Discharge  Marylou Starr discharge to home/self care  Condition at discharge: Stable        Follow-up Information     Follow up With Specialties Details Why [facility], DO Family Medicine Call in 2 days For close follow-up and evaluation of your symptoms Chiquita [address] [place] [address]   [phone]            Patient's Medications   Discharge Prescriptions    No medications on file     No discharge procedures on file      ED Provider  Electronically Signed by           Eva Whitt MD  07/07/18 6373

## 2019-03-19 PROBLEM — Z86.0100 PERSONAL HISTORY OF COLONIC POLYPS: Status: ACTIVE | Noted: 2019-03-19

## 2019-03-19 PROBLEM — Z86.010 PERSONAL HISTORY OF COLONIC POLYPS: Status: ACTIVE | Noted: 2019-03-19

## 2019-05-13 ENCOUNTER — ANESTHESIA EVENT (OUTPATIENT)
Dept: GASTROENTEROLOGY | Facility: AMBULARY SURGERY CENTER | Age: 29
End: 2019-05-13

## 2019-05-24 ENCOUNTER — HOSPITAL ENCOUNTER (OUTPATIENT)
Dept: GASTROENTEROLOGY | Facility: AMBULARY SURGERY CENTER | Age: 29
Setting detail: OUTPATIENT SURGERY
Discharge: HOME | End: 2019-05-24
Admitting: COLON & RECTAL SURGERY
Payer: MEDICARE

## 2019-05-24 ENCOUNTER — ANESTHESIA (OUTPATIENT)
Dept: GASTROENTEROLOGY | Facility: AMBULARY SURGERY CENTER | Age: 29
End: 2019-05-24

## 2019-05-24 VITALS
BODY MASS INDEX: 25.67 KG/M2 | DIASTOLIC BLOOD PRESSURE: 65 MMHG | SYSTOLIC BLOOD PRESSURE: 110 MMHG | TEMPERATURE: 97.7 F | RESPIRATION RATE: 18 BRPM | WEIGHT: 200 LBS | HEIGHT: 74 IN | OXYGEN SATURATION: 99 % | HEART RATE: 52 BPM

## 2019-05-24 DIAGNOSIS — Z86.010 HISTORY OF COLONIC POLYPS: ICD-10-CM

## 2019-05-24 RX ORDER — PROPOFOL 10 MG/ML
INJECTION, EMULSION INTRAVENOUS AS NEEDED
Status: DISCONTINUED | OUTPATIENT
Start: 2019-05-24 | End: 2019-05-24 | Stop reason: SURG

## 2019-05-24 RX ORDER — LIDOCAINE HYDROCHLORIDE 10 MG/ML
INJECTION, SOLUTION INFILTRATION; PERINEURAL AS NEEDED
Status: DISCONTINUED | OUTPATIENT
Start: 2019-05-24 | End: 2019-05-24 | Stop reason: SURG

## 2019-05-24 RX ORDER — SODIUM CHLORIDE, SODIUM LACTATE, POTASSIUM CHLORIDE, CALCIUM CHLORIDE 600; 310; 30; 20 MG/100ML; MG/100ML; MG/100ML; MG/100ML
INJECTION, SOLUTION INTRAVENOUS CONTINUOUS PRN
Status: DISCONTINUED | OUTPATIENT
Start: 2019-05-24 | End: 2019-05-24 | Stop reason: SURG

## 2019-05-24 RX ORDER — SODIUM CHLORIDE 9 MG/ML
100 INJECTION, SOLUTION INTRAVENOUS CONTINUOUS
Status: CANCELLED | OUTPATIENT
Start: 2019-05-24

## 2019-05-24 RX ADMIN — PROPOFOL 50 MG: 10 INJECTION, EMULSION INTRAVENOUS at 10:01

## 2019-05-24 RX ADMIN — SODIUM CHLORIDE, SODIUM LACTATE, POTASSIUM CHLORIDE, AND CALCIUM CHLORIDE: .6; .31; .03; .02 INJECTION, SOLUTION INTRAVENOUS at 08:53

## 2019-05-24 RX ADMIN — PROPOFOL 20 MG: 10 INJECTION, EMULSION INTRAVENOUS at 10:02

## 2019-05-24 RX ADMIN — PROPOFOL 50 MG: 10 INJECTION, EMULSION INTRAVENOUS at 09:59

## 2019-05-24 RX ADMIN — LIDOCAINE HYDROCHLORIDE ANHYDROUS 50 MG: 10 INJECTION, SOLUTION INFILTRATION at 09:59

## 2019-07-11 RX ORDER — SODIUM CHLORIDE, SODIUM LACTATE, POTASSIUM CHLORIDE, CALCIUM CHLORIDE 600; 310; 30; 20 MG/100ML; MG/100ML; MG/100ML; MG/100ML
125 INJECTION, SOLUTION INTRAVENOUS CONTINUOUS
OUTPATIENT
Start: 2019-07-11

## 2019-07-11 RX ORDER — LIDOCAINE HYDROCHLORIDE 10 MG/ML
0.5 INJECTION, SOLUTION EPIDURAL; INFILTRATION; INTRACAUDAL; PERINEURAL ONCE AS NEEDED
OUTPATIENT
Start: 2019-07-11

## 2019-07-12 ENCOUNTER — HOSPITAL ENCOUNTER (OUTPATIENT)
Dept: GASTROENTEROLOGY | Facility: AMBULARY SURGERY CENTER | Age: 29
Setting detail: OUTPATIENT SURGERY
Discharge: HOME | End: 2019-07-12
Attending: COLON & RECTAL SURGERY

## 2019-07-12 DIAGNOSIS — Z86.010 PERSONAL HISTORY OF COLONIC POLYPS: ICD-10-CM

## 2019-12-15 ENCOUNTER — APPOINTMENT (EMERGENCY)
Dept: RADIOLOGY | Facility: HOSPITAL | Age: 29
End: 2019-12-15
Payer: MEDICARE

## 2019-12-15 ENCOUNTER — HOSPITAL ENCOUNTER (EMERGENCY)
Facility: HOSPITAL | Age: 29
Discharge: HOME | End: 2019-12-15
Attending: EMERGENCY MEDICINE | Admitting: EMERGENCY MEDICINE
Payer: MEDICARE

## 2019-12-15 VITALS
RESPIRATION RATE: 20 BRPM | TEMPERATURE: 98.4 F | HEART RATE: 68 BPM | OXYGEN SATURATION: 99 % | SYSTOLIC BLOOD PRESSURE: 139 MMHG | DIASTOLIC BLOOD PRESSURE: 79 MMHG | BODY MASS INDEX: 24.39 KG/M2 | WEIGHT: 190 LBS

## 2019-12-15 DIAGNOSIS — S93.609A FOOT SPRAIN: Primary | ICD-10-CM

## 2019-12-15 PROCEDURE — 99283 EMERGENCY DEPT VISIT LOW MDM: CPT | Performed by: PHYSICIAN ASSISTANT

## 2019-12-15 PROCEDURE — 99283 EMERGENCY DEPT VISIT LOW MDM: CPT

## 2019-12-15 PROCEDURE — 73630 X-RAY EXAM OF FOOT: CPT

## 2019-12-15 RX ORDER — NAPROXEN 500 MG/1
500 TABLET ORAL 2 TIMES DAILY WITH MEALS
Qty: 30 TABLET | Refills: 0 | Status: SHIPPED | OUTPATIENT
Start: 2019-12-15 | End: 2020-07-31

## 2019-12-15 NOTE — ED PROVIDER NOTES
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 Taking?   docusate sodium (COLACE) 100 mg capsule   No No   Sig: Take 1 capsule (100 mg total) by mouth 2 (two) times a day   fluticasone (FLONASE) 50 mcg/act nasal spray   Yes No   Si spray into each nostril daily      Facility-Administered Medications: None       Past Medical History:   Diagnosis Date    Asperger's syndrome     GERD (gastroesophageal reflux disease)     Rectal prolapse     Rectal prolapse 2018       Past Surgical History:   Procedure Laterality Date    HERNIA REPAIR      CA COLONOSCOPY FLX DX W/COLLJ SPEC WHEN PFRMD N/A 2018    Procedure: COLONOSCOPY;  Surgeon: Myles Natarajan MD;  [facility];   Service: Colorectal    CA LAP, SURG PROCTOPEXY W/SIG RESECT N/A 2018    Procedure: LAPAROSCOPIC HAND-ASSIST REPAIR OF RECTAL PROLAPSE BY SACRAL  PROTOPEXY;  Surgeon: Char Elmore MD;  [facility];  Service: Colorectal    MD SIGMOIDOSCOPY FLX DX W/COLLJ SPEC BR/WA IF PFRMD N/A 6/19/2018    Procedure: Jamie Cheryl;  Surgeon: Char Elmore MD;  [facility]; Service: Colorectal    SURGERY SCROTAL / TESTICULAR         History reviewed  No pertinent family history  I have reviewed and agree with the history as documented  Social History     Tobacco Use    Smoking status: Current Every Day Smoker     Packs/day: 0 50    Smokeless tobacco: Never Used   Substance Use Topics    Alcohol use: No    Drug use: No        Review of Systems   Constitutional: Negative for chills and fever  Musculoskeletal: Positive for myalgias (Left foot pain  )  Negative for arthralgias  Skin: Negative for color change and wound  Neurological: Negative for numbness  Hematological: Does not bruise/bleed easily  Physical Exam  Physical Exam   Constitutional: He is oriented to person, place, and time  He appears well-developed and well-nourished  Non-toxic appearance  He does not have a sickly appearance  He does not appear ill  No distress  HENT:   Head: Normocephalic and atraumatic  Eyes: Conjunctivae and lids are normal    Neck: Normal range of motion  Neck supple  Cardiovascular: Intact distal pulses  Musculoskeletal:        Left ankle: Normal         Left foot: There is tenderness and bony tenderness  There is no swelling  Feet:    Neurological: He is alert and oriented to person, place, and time  He has normal strength  No sensory deficit  Skin: Skin is warm and dry         Vital Signs  ED Triage Vitals [12/15/19 1252]   Temperature Pulse Respirations Blood Pressure SpO2   98 4 °F (36 9 °C) 68 20 139/79 99 %      Temp Source Heart Rate Source Patient Position - Orthostatic VS BP Location FiO2 (%)   Oral -- -- -- --      Pain Score       6           Vitals:    12/15/19 1252   BP: 139/79   Pulse: 68         Visual Acuity      ED Medications  Medications - No data to display    Diagnostic Studies  Results Reviewed     None                 XR foot 3+ views RIGHT   ED Interpretation by Robert Villalobos PA-C (12/15 5194)   No fracture noted  Procedures  Procedures         ED Course                               MDM  Number of Diagnoses or Management Options  Foot sprain: new and requires workup  Diagnosis management comments: Patient presents for left foot pain for the last 5 days after twisting his foot while at work  Differential includes contusion versus sprain versus fracture  X-ray of left foot obtained and reviewed by me  Negative for any acute fracture  I advised the patient that once the official read is done by the radiologist, if I missed any findings he will be notified  He acknowledges understanding and agreed with plan  I discussed different options for treatment today including wrapping the patient's foot with an Ace wrap, however he declines at this time  I advised if he has ongoing pain should follow up his primary care  Additionally advised continuing Motrin Tylenol as needed pain  I advised keeping his foot elevated much as possible to reduce the swelling he is reporting  He acknowledges understanding and agreed with plan  Patient was given opportunity to ask questions  Patient is appropriate for discharge at this time  Amount and/or Complexity of Data Reviewed  Tests in the radiology section of CPT®: reviewed and ordered  Decide to obtain previous medical records or to obtain history from someone other than the patient: yes  Review and summarize past medical records: yes    Risk of Complications, Morbidity, and/or Mortality  Presenting problems: minimal  Diagnostic procedures: minimal  Management options: minimal    Patient Progress  Patient progress: stable        Disposition  Final diagnoses:    Foot sprain     Time reflects when diagnosis was documented in both MDM as applicable and the Disposition within this note     Time User Action Codes Description Comment    12/15/2019  1:32 PM Jeri Michael Add [J73 [id number]] Foot sprain       ED Disposition     ED Disposition Condition Date/Time Comment    Discharge Stable Sun Dec 15, 2019  1:32 PM Helga Rothman discharge to home/self care  Follow-up Information     Follow up With Specialties Details Why Contact Info Additional Information    Fabi Montiel,  Family Medicine Schedule an appointment as soon as possible for a visit in 2 days As needed [address] [place] [address] Emergency Department Emergency Medicine  If symptoms worsen [address]  [phone] [facility] ED, [address], [place], [facility], [address]          Discharge Medication List as of 12/15/2019  1:33 PM      START taking these medications    Details   naproxen (NAPROSYN) 500 mg tablet Take 1 tablet (500 mg total) by mouth 2 (two) times a day with meals, Starting Sun 12/15/2019, Normal         CONTINUE these medications which have NOT CHANGED    Details   docusate sodium (COLACE) 100 mg capsule Take 1 capsule (100 mg total) by mouth 2 (two) times a day, Starting Wed 5/9/2018, No Print      fluticasone (FLONASE) 50 mcg/act nasal spray 1 spray into each nostril daily, Historical Med           No discharge procedures on file      ED Provider  Electronically Signed by           [employer], JAYE  12/15/19 9040

## 2019-12-16 NOTE — RESULT ENCOUNTER NOTE
Discussed x-ray results with patient  States that this is the area that he has the greatest pain in his foot  Will return for crutches and a cast shoe

## 2019-12-16 NOTE — RESULT ENCOUNTER NOTE
Patient called the phone number listed  No answer at this time  Left message on answering machine for call back to ER

## 2019-12-17 ENCOUNTER — APPOINTMENT (OUTPATIENT)
Dept: RADIOLOGY | Facility: AMBULARY SURGERY CENTER | Age: 29
End: 2019-12-17
Payer: MEDICARE

## 2019-12-17 ENCOUNTER — OFFICE VISIT (OUTPATIENT)
Dept: OBGYN CLINIC | Facility: CLINIC | Age: 29
End: 2019-12-17
Payer: MEDICARE

## 2019-12-17 VITALS
HEART RATE: 81 BPM | HEIGHT: 74 IN | BODY MASS INDEX: 24.39 KG/M2 | SYSTOLIC BLOOD PRESSURE: 113 MMHG | DIASTOLIC BLOOD PRESSURE: 74 MMHG

## 2019-12-17 DIAGNOSIS — S93.324A LISFRANC DISLOCATION, RIGHT, INITIAL ENCOUNTER: ICD-10-CM

## 2019-12-17 DIAGNOSIS — M79.671 PAIN IN RIGHT FOOT: Primary | ICD-10-CM

## 2019-12-17 DIAGNOSIS — M79.671 PAIN IN RIGHT FOOT: ICD-10-CM

## 2019-12-17 PROCEDURE — 73620 X-RAY EXAM OF FOOT: CPT

## 2019-12-17 PROCEDURE — 99203 OFFICE O/P NEW LOW 30 MIN: CPT | Performed by: ORTHOPAEDIC SURGERY

## 2019-12-17 NOTE — PROGRESS NOTES
ZOILA Tilley  Attending, Orthopaedic Surgery  [facility] [address] [facility]        ORTHOPAEDIC FOOT AND ANKLE CLINIC VISIT     Assessment:     Encounter Diagnoses   Name Primary?  Pain in right foot Yes    Lisfranc dislocation, right, initial encounter             Plan:   · The patient verbalized understanding of exam findings and treatment plan  We engaged in the shared decision-making process and treatment options were discussed at length with the patient  Surgical and conservative management discussed today along with risks and benefits  · Initial x-rays of the right foot show a possible fracture at the base of the 2nd metatarsal  The bilateral weight bearing views obtained today do not demonstrate widening of the lisfranc joint  · He does have tenderness at the dorsal and plantar aspect of the lisfranc joint and base of 2nd metatarsal    · We will order an MRI of the right foot for better evaluation and rule out a lisfranc injury  We will call patient with any significant MRI findings  · Continue NWB in post-op shoe until MRI is completed   · Continue rest, ice, and elevation for swelling control  · Tylenol as needed for pain  Return in about 2 weeks (around 12/31/2019) for MRI review  History of Present Illness:   Chief Complaint:   Chief Complaint   Patient presents with   Elias 65 is a 34 y o  male who is being seen for right foot injury  Patient reports 1 week ago, he tripped on a driveway at work and fell  He is unsure how he twisted his foot when he fell  Pain is localized at midfoot with minimal radiating and described as sharp and severe  He was seen in the ED and told he may have a fracture at the base of the 2nd metatarsal and he was instructed to be NWB in a post-op shoe  Patient denies numbness, tingling or radicular pain  Denies history of neuropathy    Patient does not smoke, does not have diabetes and does not take blood thinners  Patient denies family history of anesthesia complications and has not had any complications with anesthesia  Pain/symptom timing:  Worse during the day when active  Pain/symptom context:  Worse with activites and work  Pain/symptom modifying factors:  Rest makes better, activities make worse  Pain/symptom associated signs/symptoms: none    Prior treatment   · NSAIDsYes    · Injections No   · Bracing/Orthotics Yes   · Physical Therapy No     Orthopedic Surgical History:   See below     Past Medical, Surgical and Social History:  Past Medical History:  has a past medical history of Asperger's syndrome, GERD (gastroesophageal reflux disease), Rectal prolapse, and Rectal prolapse (5/9/2018)  Problem List: does not have any pertinent problems on file  Past Surgical History:  has a past surgical history that includes Hernia repair; Surgery scrotal / testicular; pr colonoscopy flx dx w/collj spec when pfrmd (N/A, 5/7/2018); pr lap, surg proctopexy w/sig resect (N/A, 5/8/2018); and pr sigmoidoscopy flx dx w/collj spec br/wa if pfrmd (N/A, 6/19/2018)  Family History: family history is not on file  Social History:  reports that he has been smoking  He has been smoking about 0 50 packs per day  He has never used smokeless tobacco  He reports that he does not drink alcohol or use drugs  Current Medications: has a current medication list which includes the following prescription(s): docusate sodium, fluticasone, and naproxen  Allergies: has No Known Allergies       Review of Systems:  General- denies fever/chills  HEENT- denies hearing loss or sore throat  Eyes- denies eye pain or visual disturbances, denies red eyes  Respiratory- denies cough or SOB  Cardio- denies chest pain or palpitations  GI- denies abdominal pain  Endocrine- denies urinary frequency  Urinary- denies pain with urination  Musculoskeletal- Negative except noted above  Skin- denies rashes or wounds  Neurological- denies dizziness or headache  Psychiatric- denies anxiety or difficulty concentrating    Physical Exam:   /74 (BP Location: Left arm, Patient Position: Sitting, Cuff Size: Adult)   Pulse 81   Ht 6' 2" (1 88 m)   BMI 24 39 kg/m²   General/Constitutional: No apparent distress: well-nourished and well developed  Eyes: normal ocular motion  Lymphatic: No appreciable lymphadenopathy  Respiratory: Non-labored breathing  Vascular: No edema, swelling or tenderness, except as noted in detailed exam   Integumentary: No impressive skin lesions present, except as noted in detailed exam   Neuro: No ataxia or tremors noted  Psych: Normal mood and affect, oriented to person, place and time  Appropriate affect  Musculoskeletal: Normal, except as noted in detailed exam and in HPI  Examination    Right    Gait Not assessed    Musculoskeletal Tender to palpation at base of 2nd metatarsal and dorsal and plantar aspect of lisfranc joint    Skin Normal       Nails Normal    Range of Motion  20 degrees dorsiflexion, 40 degrees plantarflexion  Subtalar motion: normal    Stability Stable    Muscle Strength 5/5 tibialis anterior  5/5 gastrocnemius-soleus  5/5 posterior tibialis  5/5 peroneal/eversion strength  5/5 EHL  5/5 FHL    Neurologic Normal    Sensation Intact to light touch throughout sural, saphenous, superficial peroneal, deep peroneal and medial/lateral plantar nerve distributions  [place] 5 07 filament (10g) testing deferred  Cardiovascular Brisk capillary refill < 2 seconds,intact DP and PT pulses    Special Tests None      Imaging Studies:   3 views of the right foot were taken, reviewed and interpreted independently that demonstrate a possible nondisplaced fracture at the base of the 2nd metatarsal consistent with his area of pain  Reviewed by me personally  Bilateral weight bearing foot x-rays obtained today demonstrate no significant widening of the lisfranc joint       Scribe Attestation    I,:   Zay Medley, JAYE am acting as a scribe while in the presence of the attending physician :        I,:   Danielle Yan MD personally performed the services described in this documentation    as scribed in my presence :              Franceen Rear Lachman, MD  Foot & Ankle Surgery   Department [address]      I personally performed the service  Franceen Rear Lachman, MD

## 2019-12-19 ENCOUNTER — HOSPITAL ENCOUNTER (OUTPATIENT)
Dept: MRI IMAGING | Facility: HOSPITAL | Age: 29
Discharge: HOME | End: 2019-12-19
Attending: ORTHOPAEDIC SURGERY
Payer: MEDICARE

## 2019-12-19 DIAGNOSIS — S93.324A LISFRANC DISLOCATION, RIGHT, INITIAL ENCOUNTER: ICD-10-CM

## 2019-12-19 PROCEDURE — 73718 MRI LOWER EXTREMITY W/O DYE: CPT

## 2019-12-20 ENCOUNTER — TELEPHONE (OUTPATIENT)
Dept: OBGYN CLINIC | Facility: HOSPITAL | Age: 29
End: 2019-12-20

## 2019-12-20 ENCOUNTER — TELEPHONE (OUTPATIENT)
Dept: OBGYN CLINIC | Facility: CLINIC | Age: 29
End: 2019-12-20

## 2019-12-20 DIAGNOSIS — M84.374A STRESS FRACTURE OF METATARSAL BONE OF RIGHT FOOT, INITIAL ENCOUNTER: Primary | ICD-10-CM

## 2019-12-20 NOTE — TELEPHONE ENCOUNTER
Called patient to discuss MRI results  Spoke with patient and his mother  Explained that the MRI shows a stress fracture at the base of the first metatarsal with associated bone marrow edema  The lisfranc ligament is intact  Recommend WBAT in a cam boot  Cam boot order was placed and will be faxed to [facility], where the patient will go to pick it up  Discussed Vitamin D and Calcium supplementation  Avoid NSAID medications which can delay bone healing, advised tylenol for pain  Discussed elevation and ice for swelling control  We will see the patient back for reevaluation in 4 weeks

## 2019-12-20 NOTE — TELEPHONE ENCOUNTER
Nancy calling from [address] [place] [address] radiology to report a stress fx in the patient's MRI   Thank you

## 2020-01-14 ENCOUNTER — OFFICE VISIT (OUTPATIENT)
Dept: OBGYN CLINIC | Facility: CLINIC | Age: 30
End: 2020-01-14
Payer: MEDICARE

## 2020-01-14 VITALS
BODY MASS INDEX: 24.39 KG/M2 | HEIGHT: 74 IN | SYSTOLIC BLOOD PRESSURE: 113 MMHG | DIASTOLIC BLOOD PRESSURE: 81 MMHG | HEART RATE: 86 BPM

## 2020-01-14 DIAGNOSIS — M84.374D STRESS FRACTURE OF METATARSAL BONE OF RIGHT FOOT WITH ROUTINE HEALING, SUBSEQUENT ENCOUNTER: Primary | ICD-10-CM

## 2020-01-14 PROCEDURE — 99213 OFFICE O/P EST LOW 20 MIN: CPT | Performed by: ORTHOPAEDIC SURGERY

## 2020-01-14 NOTE — PATIENT INSTRUCTIONS
We recommend gradually returning to your full activities, the slower you return to aggressive activities, the better

## 2020-01-14 NOTE — PROGRESS NOTES
ZOILA Diez  Attending, Orthopaedic Surgery  [facility] [address] [facility]      ORTHOPAEDIC FOOT AND ANKLE CLINIC VISIT     Assessment:     Encounter Diagnosis   Name Primary?  Stress fracture of metatarsal bone of right foot with routine healing, subsequent encounter Yes            Plan:   · The patient verbalized understanding of exam findings and treatment plan  We engaged in the shared decision-making process and treatment options were discussed at length with the patient  Surgical and conservative management discussed today along with risks and benefits  · He had an MRI 4 weeks ago that demonstrated a stress fracture at the base of the 1st metatarsal    · Patient reports his pain has 100% resolved  · He transitioned himself out of the cam boot and into a sneaker about 3 weeks ago against our advice  · Discussed gradual return to full activities in a supportive sneaker  · Continue rest, ice, elevation, and tylenol as needed   Return if symptoms worsen or fail to improve  History of Present Illness:   Chief Complaint:   Chief Complaint   Patient presents with   [address] is a 27 y o  male who is being seen in follow-up for stress fracture at the base of the 1st metatarsal  When we last saw he we recommended WBAT in cam boot  Patient reports he discontinued the cam boot after about 1 week  Pain has 100% resolved, no residual pain       Pain/symptom timing:  Worse during the day when active  Pain/symptom context:  Worse with activites and work  Pain/symptom modifying factors:  Rest makes better, activities make worse  Pain/symptom associated signs/symptoms: none    Prior treatment   · NSAIDsNo   · Injections No   · Bracing/Orthotics Yes    · Physical Therapy No     Orthopedic Surgical History:   See below     Past Medical, Surgical and Social History:  Past Medical History:  has a past medical history of Asperger's syndrome, GERD (gastroesophageal reflux disease), Rectal prolapse, and Rectal prolapse (5/9/2018)  Problem List: does not have any pertinent problems on file  Past Surgical History:  has a past surgical history that includes Hernia repair; Surgery scrotal / testicular; pr colonoscopy flx dx w/collj spec when pfrmd (N/A, 5/7/2018); pr lap, surg proctopexy w/sig resect (N/A, 5/8/2018); and pr sigmoidoscopy flx dx w/collj spec br/wa if pfrmd (N/A, 6/19/2018)  Family History: family history is not on file  Social History:  reports that he has been smoking  He has been smoking about 0 50 packs per day  He has never used smokeless tobacco  He reports that he does not drink alcohol or use drugs  Current Medications: has a current medication list which includes the following prescription(s): docusate sodium, fluticasone, and naproxen  Allergies: has No Known Allergies  Review of Systems:  General- denies fever/chills  HEENT- denies hearing loss or sore throat  Eyes- denies eye pain or visual disturbances, denies red eyes  Respiratory- denies cough or SOB  Cardio- denies chest pain or palpitations  GI- denies abdominal pain  Endocrine- denies urinary frequency  Urinary- denies pain with urination  Musculoskeletal- Negative except noted above  Skin- denies rashes or wounds  Neurological- denies dizziness or headache  Psychiatric- denies anxiety or difficulty concentrating    Physical Exam:   /81 (BP Location: Right arm, Patient Position: Sitting, Cuff Size: Adult)   Pulse 86   Ht 6' 2" (1 88 m)   BMI 24 39 kg/m²   General/Constitutional: No apparent distress: well-nourished and well developed    Eyes: normal ocular motion  Lymphatic: No appreciable lymphadenopathy  Respiratory: Non-labored breathing  Vascular: No edema, swelling or tenderness, except as noted in detailed exam   Integumentary: No impressive skin lesions present, except as noted in detailed exam   Neuro: No ataxia or tremors noted  Psych: Normal mood and affect, oriented to person, place and time  Appropriate affect  Musculoskeletal: Normal, except as noted in detailed exam and in HPI  Examination    Right    Gait Normal   Musculoskeletal Nontender to palpation at the midfoot, specifically no tenderness over the base of the 1st metatarsal    Skin Normal     Nails Normal    Range of Motion  20 degrees dorsiflexion, 40 degrees plantarflexion  Subtalar motion: normal    Stability Stable    Muscle Strength 5/5 tibialis anterior  5/5 gastrocnemius-soleus  5/5 posterior tibialis  5/5 peroneal/eversion strength  5/5 EHL  5/5 FHL    Neurologic Normal    Sensation Intact to light touch throughout sural, saphenous, superficial peroneal, deep peroneal and medial/lateral plantar nerve distributions  Seffner-Bruna 5 07 filament (10g) testing deferred  Cardiovascular Brisk capillary refill < 2 seconds,intact DP and PT pulses    Special Tests None      Imaging Studies:   No new imaging    Scribe Attestation    I,:   Teagan Garcias PA-C am acting as a scribe while in the presence of the attending physician :        I,:   Luke Salcedo MD personally performed the services described in this documentation    as scribed in my presence :                  Ane Barlow Lachman, MD  Foot & Ankle Surgery   Department of [place] [address]      I personally performed the service  Ane Barlow Lachman, MD

## 2020-07-31 ENCOUNTER — OFFICE VISIT (OUTPATIENT)
Dept: FAMILY MEDICINE CLINIC | Facility: CLINIC | Age: 30
End: 2020-07-31
Payer: MEDICARE

## 2020-07-31 VITALS
OXYGEN SATURATION: 98 % | TEMPERATURE: 97.2 F | SYSTOLIC BLOOD PRESSURE: 102 MMHG | HEIGHT: 74 IN | BODY MASS INDEX: 25.67 KG/M2 | HEART RATE: 74 BPM | WEIGHT: 200 LBS | DIASTOLIC BLOOD PRESSURE: 70 MMHG | RESPIRATION RATE: 18 BRPM

## 2020-07-31 DIAGNOSIS — N39.0 URINARY TRACT INFECTION WITHOUT HEMATURIA, SITE UNSPECIFIED: ICD-10-CM

## 2020-07-31 DIAGNOSIS — Z20.2 POSSIBLE EXPOSURE TO STD: ICD-10-CM

## 2020-07-31 DIAGNOSIS — R86.8 ABNORMAL SPERM MORPHOLOGY: ICD-10-CM

## 2020-07-31 DIAGNOSIS — R30.0 BURNING WITH URINATION: Primary | ICD-10-CM

## 2020-07-31 LAB
BACTERIA UR QL AUTO: ABNORMAL /HPF
BILIRUB UR QL STRIP: ABNORMAL
CLARITY UR: ABNORMAL
COLOR UR: ABNORMAL
GLUCOSE UR STRIP-MCNC: NEGATIVE MG/DL
HGB UR QL STRIP.AUTO: NEGATIVE
HYALINE CASTS #/AREA URNS LPF: ABNORMAL /LPF
KETONES UR STRIP-MCNC: NEGATIVE MG/DL
LEUKOCYTE ESTERASE UR QL STRIP: ABNORMAL
NITRITE UR QL STRIP: NEGATIVE
NON-SQ EPI CELLS URNS QL MICRO: ABNORMAL /HPF
PH UR STRIP.AUTO: 8 [PH]
PROT UR STRIP-MCNC: ABNORMAL MG/DL
RBC #/AREA URNS AUTO: ABNORMAL /HPF
SL AMB  POCT GLUCOSE, UA: ABNORMAL
SL AMB LEUKOCYTE ESTERASE,UA: ABNORMAL
SL AMB POCT BILIRUBIN,UA: ABNORMAL
SL AMB POCT BLOOD,UA: ABNORMAL
SL AMB POCT CLARITY,UA: ABNORMAL
SL AMB POCT COLOR,UA: ABNORMAL
SL AMB POCT KETONES,UA: ABNORMAL
SL AMB POCT NITRITE,UA: ABNORMAL
SL AMB POCT PH,UA: 8
SL AMB POCT SPECIFIC GRAVITY,UA: 1
SL AMB POCT URINE PROTEIN: ABNORMAL
SL AMB POCT UROBILINOGEN: 0.02
SP GR UR STRIP.AUTO: 1.03 (ref 1–1.03)
UROBILINOGEN UR QL STRIP.AUTO: 1 E.U./DL
WBC #/AREA URNS AUTO: ABNORMAL /HPF

## 2020-07-31 PROCEDURE — 99213 OFFICE O/P EST LOW 20 MIN: CPT | Performed by: NURSE PRACTITIONER

## 2020-07-31 PROCEDURE — 81002 URINALYSIS NONAUTO W/O SCOPE: CPT | Performed by: NURSE PRACTITIONER

## 2020-07-31 PROCEDURE — 81001 URINALYSIS AUTO W/SCOPE: CPT | Performed by: NURSE PRACTITIONER

## 2020-07-31 PROCEDURE — 3008F BODY MASS INDEX DOCD: CPT | Performed by: NURSE PRACTITIONER

## 2020-07-31 PROCEDURE — 87086 URINE CULTURE/COLONY COUNT: CPT | Performed by: NURSE PRACTITIONER

## 2020-07-31 RX ORDER — DOXYCYCLINE HYCLATE 100 MG
100 TABLET ORAL 2 TIMES DAILY
Qty: 20 TABLET | Refills: 0 | Status: SHIPPED | OUTPATIENT
Start: 2020-07-31 | End: 2020-08-10

## 2020-07-31 NOTE — PATIENT INSTRUCTIONS

## 2020-07-31 NOTE — PROGRESS NOTES
Assessment/Plan:    Patient of Dr Elliott Ash in office for follow up urinary issues   Possible STD exposure   Finds issues with sperm - to thick and difficulty with ejaculation   Has had some burning and frequency and urine is thick and dark and concentrated  He does not drink as much water as he should     Urinalysis show UTI in office has leuks   We will treat sent Doxy for now   Will send urine for further testing for STD and did orders some labs    Will follow up in few weeks     Problem List Items Addressed This Visit     None      Visit Diagnoses     Burning with urination    -  Primary    Relevant Medications    doxycycline hyclate (VIBRA-TABS) 100 mg tablet    Other Relevant Orders    POCT urine dip (Completed)    UA w Reflex to Microscopic w Reflex to Culture - Clinic Collect    Chlamydia/GC amplified DNA by PCR    CBC and Platelet    RPR    HIV 1/2 Antigen/Antibody (4th Generation) w Reflex [facility]    Hepatitis C antibody    Ct, Ng, Trich vag by MARY    Comprehensive metabolic panel    Possible exposure to STD        Relevant Medications    doxycycline hyclate (VIBRA-TABS) 100 mg tablet    Other Relevant Orders    UA w Reflex to Microscopic w Reflex to Culture - Clinic Collect    Chlamydia/GC amplified DNA by PCR    CBC and Platelet    RPR    HIV 1/2 Antigen/Antibody (4th Generation) w Reflex [facility]    Hepatitis C antibody    Ct, Ng, Trich vag by MARY    Comprehensive metabolic panel    Abnormal sperm morphology        Relevant Orders    Ambulatory referral to Urology    Comprehensive metabolic panel    Urinary tract infection without hematuria, site unspecified        Relevant Medications    doxycycline hyclate (VIBRA-TABS) 100 mg tablet    Other Relevant Orders    Comprehensive metabolic panel            Subjective:      Patient ID: Israel Braxton is a 27 y o  male      Patient of Dr Elliott Ash in office for follow up urinary issues   Possible STD exposure   Finds issues with sperm - to thick and difficulty with ejaculation   Has had some burning and frequency and urine is thick and dark and concentrated  The following portions of the patient's history were reviewed and updated as appropriate:   He has a past medical history of Asperger's syndrome, GERD (gastroesophageal reflux disease), Rectal prolapse, and Rectal prolapse (5/9/2018)  ,  does not have any pertinent problems on file  ,   has a past surgical history that includes Hernia repair; Surgery scrotal / testicular; pr colonoscopy flx dx w/collj spec when pfrmd (N/A, 5/7/2018); pr lap, surg proctopexy w/sig resect (N/A, 5/8/2018); and pr sigmoidoscopy flx dx w/collj spec br/wa if pfrmd (N/A, 6/19/2018)  ,  Family history is unknown by patient  ,   reports that he has been smoking  He has been smoking about 0 50 packs per day  He has never used smokeless tobacco  He reports that he does not drink alcohol or use drugs  ,  has No Known Allergies     Current Outpatient Medications   Medication Sig Dispense Refill    docusate sodium (COLACE) 100 mg capsule Take 1 capsule (100 mg total) by mouth 2 (two) times a day 10 capsule 0    doxycycline hyclate (VIBRA-TABS) 100 mg tablet Take 1 tablet (100 mg total) by mouth 2 (two) times a day for 10 days 20 tablet 0     No current facility-administered medications for this visit  Review of Systems   Constitutional: Negative for activity change, fatigue and fever  HENT: Positive for sore throat  Negative for congestion and sinus pain  Eyes: Negative  Respiratory: Negative for cough and shortness of breath  Cardiovascular: Negative for chest pain, palpitations and leg swelling  Gastrointestinal: Positive for abdominal pain (intermittent pelvic pain )  Negative for abdominal distention and nausea  Endocrine: Negative  Genitourinary: Positive for decreased urine volume, difficulty urinating, dysuria, frequency and urgency  Musculoskeletal: Negative  Negative for arthralgias  Neurological: Negative for dizziness and headaches  Hematological: Negative for adenopathy  Psychiatric/Behavioral: Negative for sleep disturbance and suicidal ideas  The patient is not nervous/anxious  Objective:  Vitals:    07/31/20 0900   BP: 102/70   BP Location: Left arm   Patient Position: Sitting   Cuff Size: Adult   Pulse: 74   Resp: 18   Temp: (!) 97 2 °F (36 2 °C)   TempSrc: Temporal   SpO2: 98%   Weight: 90 7 kg (200 lb)   Height: 6' 2" (1 88 m)     Body mass index is 25 68 kg/m²  Physical Exam   Constitutional: He is oriented to person, place, and time  He appears well-developed and well-nourished  HENT:   Head: Atraumatic  Left Ear: Hearing normal    Mouth/Throat: Oropharynx is clear and moist    Neck: Normal range of motion  Cardiovascular: Normal rate  Pulmonary/Chest: Effort normal    Abdominal: Soft  Neurological: He is alert and oriented to person, place, and time  Psychiatric: He has a normal mood and affect  His behavior is normal    Nursing note and vitals reviewed

## 2020-08-02 LAB — BACTERIA UR CULT: ABNORMAL

## 2020-08-11 LAB
ALBUMIN SERPL-MCNC: 4.4 G/DL (ref 3.6–5.1)
ALBUMIN/GLOB SERPL: 1.8 (CALC) (ref 1–2.5)
ALP SERPL-CCNC: 89 U/L (ref 36–130)
ALT SERPL-CCNC: 12 U/L (ref 9–46)
AST SERPL-CCNC: 14 U/L (ref 10–40)
BASOPHILS # BLD AUTO: 39 CELLS/UL (ref 0–200)
BASOPHILS NFR BLD AUTO: 0.6 %
BILIRUB SERPL-MCNC: 0.4 MG/DL (ref 0.2–1.2)
BUN SERPL-MCNC: 15 MG/DL (ref 7–25)
BUN/CREAT SERPL: NORMAL (CALC) (ref 6–22)
CALCIUM SERPL-MCNC: 9.7 MG/DL (ref 8.6–10.3)
CHLORIDE SERPL-SCNC: 106 MMOL/L (ref 98–110)
CO2 SERPL-SCNC: 29 MMOL/L (ref 20–32)
CREAT SERPL-MCNC: 0.9 MG/DL (ref 0.6–1.35)
EOSINOPHIL # BLD AUTO: 98 CELLS/UL (ref 15–500)
EOSINOPHIL NFR BLD AUTO: 1.5 %
ERYTHROCYTE [DISTWIDTH] IN BLOOD BY AUTOMATED COUNT: 13.1 % (ref 11–15)
GLOBULIN SER CALC-MCNC: 2.5 G/DL (CALC) (ref 1.9–3.7)
GLUCOSE SERPL-MCNC: 92 MG/DL (ref 65–99)
HCT VFR BLD AUTO: 46.9 % (ref 38.5–50)
HCV AB S/CO SERPL IA: 0.05
HCV AB SERPL QL IA: NORMAL
HGB BLD-MCNC: 15.3 G/DL (ref 13.2–17.1)
HIV 1+2 AB+HIV1 P24 AG SERPL QL IA: NORMAL
LYMPHOCYTES # BLD AUTO: 3536 CELLS/UL (ref 850–3900)
LYMPHOCYTES NFR BLD AUTO: 54.4 %
MCH RBC QN AUTO: 28.3 PG (ref 27–33)
MCHC RBC AUTO-ENTMCNC: 32.6 G/DL (ref 32–36)
MCV RBC AUTO: 86.9 FL (ref 80–100)
MONOCYTES # BLD AUTO: 442 CELLS/UL (ref 200–950)
MONOCYTES NFR BLD AUTO: 6.8 %
NEUTROPHILS # BLD AUTO: 2386 CELLS/UL (ref 1500–7800)
NEUTROPHILS NFR BLD AUTO: 36.7 %
PLATELET # BLD AUTO: 224 THOUSAND/UL (ref 140–400)
PMV BLD REES-ECKER: 10.9 FL (ref 7.5–12.5)
POTASSIUM SERPL-SCNC: 4.6 MMOL/L (ref 3.5–5.3)
PROT SERPL-MCNC: 6.9 G/DL (ref 6.1–8.1)
RBC # BLD AUTO: 5.4 MILLION/UL (ref 4.2–5.8)
RPR SER QL: NORMAL
SL AMB EGFR AFRICAN AMERICAN: 132 ML/MIN/1.73M2
SL AMB EGFR NON AFRICAN AMERICAN: 114 ML/MIN/1.73M2
SODIUM SERPL-SCNC: 144 MMOL/L (ref 135–146)
WBC # BLD AUTO: 6.5 THOUSAND/UL (ref 3.8–10.8)

## 2020-08-17 ENCOUNTER — OFFICE VISIT (OUTPATIENT)
Dept: FAMILY MEDICINE CLINIC | Facility: CLINIC | Age: 30
End: 2020-08-17
Payer: MEDICARE

## 2020-08-17 VITALS
DIASTOLIC BLOOD PRESSURE: 72 MMHG | OXYGEN SATURATION: 98 % | RESPIRATION RATE: 14 BRPM | WEIGHT: 196 LBS | HEIGHT: 74 IN | TEMPERATURE: 97.2 F | BODY MASS INDEX: 25.15 KG/M2 | HEART RATE: 84 BPM | SYSTOLIC BLOOD PRESSURE: 106 MMHG

## 2020-08-17 DIAGNOSIS — Z72.52 HIGH RISK HOMOSEXUAL BEHAVIOR: Primary | ICD-10-CM

## 2020-08-17 PROCEDURE — 3008F BODY MASS INDEX DOCD: CPT | Performed by: FAMILY MEDICINE

## 2020-08-17 PROCEDURE — 99214 OFFICE O/P EST MOD 30 MIN: CPT | Performed by: FAMILY MEDICINE

## 2020-08-17 NOTE — PATIENT INSTRUCTIONS
Condom Use   DEE Dubon Jr: Review of non-hormonal contraception (condoms, intrauterine devices, nonoxynol-9 and combos) on HIV acquisition  J Acquir Immune Defic Syndr, 2005; 38(Suppl 1):S8-10  RADHA Henson, MelodyDA, MARYCRUZ Knox, et al: Non-latex versus latex male condoms for contraception  [place] Database Syst Rev, 2006; 2006(1):FE418094-VI181668  Erin Mcnair, AlejandraT, et al: [facility] contraception consensus  J Obstet Gynaecol Can, 2004; 26(4):347-436  Faculty of Family Planning and Reproductive Health Care: Faculty of Family Planning and Reproductive Health Care Clinical Guidance: Male and Female Condoms  Available at: http://www  [facility] org uk/admin/uploads/CEUguidanceMaleFemaleCondomsJan07  pdf , Accessed May 14, 2007  [address] [place] [address]: Barrier and spermicidal contraceptives in adolescence  Adolesc Med Clin, 2005; 16(3):495-515  MOISE Mcduffie: Condoms: the basics and beyond  Adolesc Med, 2003; 14(3):633-645  TAN Ramirez, DEE Adair, & ARYAN Barney: Promoting protection and pleasure: amplifying the effectiveness of barriers against sexually transmitted infections and pregnancy  Lancet, 2006; 319(9117):9242-4097  None Listed: Choice of contraceptives  Treat Guidel Med Noelle, 2004; 2(24):55-62  © 2017 [address]  Information is for End User's use only and may not be sold, redistributed or otherwise used for commercial purposes  All illustrations and images included in CareNotes® are the copyrighted property of A D A Anke , [place]  or Dipak Denton  The above information is an  only  It is not intended as medical advice for individual conditions or treatments  Talk to your doctor, nurse or pharmacist before following any medical regimen to see if it is safe and effective for you

## 2020-08-17 NOTE — PROGRESS NOTES
Assessment/Plan:eval and refer for "Prep" use - pt is seeing one man relationship, he on Prep  Problem List Items Addressed This Visit     None      Visit Diagnoses     High risk homosexual behavior    -  Primary    Relevant Orders    Ambulatory referral to Infectious Disease            Subjective:      Patient ID: Israel Braxton is a 27 y o  male  HPI    The following portions of the patient's history were reviewed and updated as appropriate:   He has a past medical history of Asperger's syndrome, GERD (gastroesophageal reflux disease), Rectal prolapse, and Rectal prolapse (5/9/2018)  ,  does not have any pertinent problems on file  ,   has a past surgical history that includes Hernia repair; Surgery scrotal / testicular; pr colonoscopy flx dx w/collj spec when pfrmd (N/A, 5/7/2018); pr lap, surg proctopexy w/sig resect (N/A, 5/8/2018); and pr sigmoidoscopy flx dx w/collj spec br/wa if pfrmd (N/A, 6/19/2018)  ,  Family history is unknown by patient  ,   reports that he has been smoking  He has been smoking about 0 50 packs per day  He has never used smokeless tobacco  He reports that he does not drink alcohol or use drugs  ,  has No Known Allergies     Current Outpatient Medications   Medication Sig Dispense Refill    docusate sodium (COLACE) 100 mg capsule Take 1 capsule (100 mg total) by mouth 2 (two) times a day 10 capsule 0     No current facility-administered medications for this visit  Review of Systems   Constitutional: Negative for activity change, appetite change, chills, diaphoresis, fatigue, fever and unexpected weight change  HENT: Negative for congestion, dental problem, drooling, ear discharge, ear pain, facial swelling, mouth sores, nosebleeds, postnasal drip, rhinorrhea, trouble swallowing and voice change  Eyes: Negative for photophobia, pain, discharge, redness, itching and visual disturbance     Respiratory: Negative for apnea, cough, choking, chest tightness and shortness of breath  Cardiovascular: Negative for chest pain and leg swelling  Gastrointestinal: Negative for abdominal distention, abdominal pain, constipation, diarrhea and nausea  Endocrine: Negative for polydipsia, polyphagia and polyuria  Genitourinary: Negative for decreased urine volume, difficulty urinating, dysuria, enuresis and hematuria  Father  of renal cancer - age 46   Musculoskeletal: Positive for arthralgias  Negative for back pain, gait problem and joint swelling  Skin: Negative for color change, pallor, rash and wound  Allergic/Immunologic: Negative for immunocompromised state  Neurological: Negative for dizziness, seizures, syncope, facial asymmetry, speech difficulty, light-headedness and headaches  Hematological: Negative for adenopathy  Psychiatric/Behavioral: Negative for agitation, behavioral problems, confusion and decreased concentration  Objective:  Vitals:    20 0910   BP: 106/72   BP Location: Left arm   Patient Position: Sitting   Cuff Size: Adult   Pulse: 84   Resp: 14   Temp: (!) 97 2 °F (36 2 °C)   TempSrc: Temporal   SpO2: 98%   Weight: 88 9 kg (196 lb)   Height: 6' 2" (1 88 m)     Body mass index is 25 16 kg/m²  Physical Exam  Vitals signs and nursing note reviewed  Constitutional:       Appearance: He is well-developed  He is not diaphoretic  HENT:      Head: Normocephalic and atraumatic  Nose: Nose normal       Mouth/Throat:      Mouth: Mucous membranes are moist    Eyes:      Pupils: Pupils are equal, round, and reactive to light  Neck:      Musculoskeletal: Normal range of motion and neck supple  Trachea: No tracheal deviation  Cardiovascular:      Rate and Rhythm: Normal rate and regular rhythm  Heart sounds: Normal heart sounds  Pulmonary:      Effort: Pulmonary effort is normal       Breath sounds: Normal breath sounds  No wheezing  Abdominal:      Palpations: Abdomen is soft     Musculoskeletal: Normal range of motion  Lymphadenopathy:      Cervical: No cervical adenopathy  Skin:     General: Skin is warm and dry  Neurological:      General: No focal deficit present  Mental Status: He is alert and oriented to person, place, and time  Mental status is at baseline  Psychiatric:         Mood and Affect: Mood normal          Behavior: Behavior normal          Thought Content:  Thought content normal          Judgment: Judgment normal       Comments: Does have homosexual ideation and is in that relationship with a 24 yr old man

## 2020-09-08 ENCOUNTER — TELEMEDICINE (OUTPATIENT)
Dept: FAMILY MEDICINE CLINIC | Facility: CLINIC | Age: 30
End: 2020-09-08
Payer: MEDICARE

## 2020-09-08 VITALS — HEIGHT: 74 IN | TEMPERATURE: 99.9 F | BODY MASS INDEX: 24.38 KG/M2 | WEIGHT: 190 LBS

## 2020-09-08 DIAGNOSIS — R50.9 FEVER, UNSPECIFIED FEVER CAUSE: Primary | ICD-10-CM

## 2020-09-08 DIAGNOSIS — J02.9 SORE THROAT: ICD-10-CM

## 2020-09-08 DIAGNOSIS — R51.9 BILATERAL HEADACHES: ICD-10-CM

## 2020-09-08 DIAGNOSIS — R05.9 COUGH: ICD-10-CM

## 2020-09-08 DIAGNOSIS — R50.9 FEVER, UNSPECIFIED FEVER CAUSE: ICD-10-CM

## 2020-09-08 DIAGNOSIS — H92.03 OTALGIA OF BOTH EARS: ICD-10-CM

## 2020-09-08 DIAGNOSIS — Z20.822 EXPOSURE TO COVID-19 VIRUS: ICD-10-CM

## 2020-09-08 PROCEDURE — U0003 INFECTIOUS AGENT DETECTION BY NUCLEIC ACID (DNA OR RNA); SEVERE ACUTE RESPIRATORY SYNDROME CORONAVIRUS 2 (SARS-COV-2) (CORONAVIRUS DISEASE [COVID-19]), AMPLIFIED PROBE TECHNIQUE, MAKING USE OF HIGH THROUGHPUT TECHNOLOGIES AS DESCRIBED BY CMS-2020-01-R: HCPCS | Performed by: NURSE PRACTITIONER

## 2020-09-08 PROCEDURE — 99442 PR PHYS/QHP TELEPHONE EVALUATION 11-20 MIN: CPT | Performed by: NURSE PRACTITIONER

## 2020-09-08 RX ORDER — AMOXICILLIN 875 MG/1
875 TABLET, COATED ORAL 2 TIMES DAILY
Qty: 14 TABLET | Refills: 0 | Status: SHIPPED | OUTPATIENT
Start: 2020-09-08 | End: 2020-09-15

## 2020-09-08 NOTE — PATIENT INSTRUCTIONS

## 2020-09-08 NOTE — PROGRESS NOTES
Virtual Brief Visit    Presents in office for sick visit via telemedicine   C/o sore throat and ear pain  For about 1 week   Has headaches and intermittent cough   Sinus congestion   Possible exposure to COVID - will need to be tested       Assessment/Plan:    Problem List Items Addressed This Visit     None      Visit Diagnoses     Fever, unspecified fever cause    -  Primary    Relevant Medications    amoxicillin (AMOXIL) 875 mg tablet    Other Relevant Orders    Novel Coronavirus (COVID-19), PCR [facility] - Collected at [facility]    Sore throat        Relevant Medications    amoxicillin (AMOXIL) 875 mg tablet    Other Relevant Orders    Novel Coronavirus (COVID-19), PCR [facility] - Collected at [facility]    Otalgia of both ears        Relevant Medications    amoxicillin (AMOXIL) 875 mg tablet    Bilateral headaches        Relevant Orders    Novel Coronavirus (COVID-19), PCR [facility] - Collected at [facility]    Cough        Relevant Orders    Novel Coronavirus (COVID-19), PCR [facility] - Collected at [facility]    Exposure to COVID-19 virus                    Reason for visit is   Chief Complaint   Patient presents with    Sore Throat     symptoms present for about 1 week    Fever     per pt, was 100 7, 99 9 this AM    Nausea    Generalized Body Aches     has not knowingly been exposed to [employer], has not traveled outside of the state in the past week    Nasal Congestion    Virtual Brief Visit        Encounter provider Lc Peña    Provider located at [address] [place] [address] [address]  [address] [facility] [address] [phone]    Recent Visits  No visits were found meeting these conditions     Showing recent visits within past 7 days and meeting all other requirements     Today's Visits  Date Type Provider Dept   09/08/20 Telemedicine [facility]LIN [facility] [place]   Showing today's visits and meeting all other requirements     Future Appointments  No visits were found meeting these conditions  Showing future appointments within next 150 days and meeting all other requirements        After connecting through telephone, the patient was identified by name and date of birth  Norma Caban was informed that this is a telemedicine visit and that the visit is being conducted through telephone  My office door was closed  No one else was in the room  He acknowledged consent and understanding of privacy and security of the platform  The patient has agreed to participate and understands he can discontinue the visit at any time  Patient is aware this is a billable service  Subjective    Norma Caban is a 27 y o  male   Patient Active Problem List   Diagnosis    Rectal prolapse    Rectal bleed    Personal history of colonic polyps     Presents in office via telemedicine   Presents in office for sick visit via telemedicine   C/o sore throat and ear pain  For about 1 week   Has headaches and intermittent cough   Sinus congestion   Possible exposure to COVID - will need to be tested            Past Medical History:   Diagnosis Date    Asperger's syndrome     GERD (gastroesophageal reflux disease)     Rectal prolapse     Rectal prolapse 5/9/2018       Past Surgical History:   Procedure Laterality Date    HERNIA REPAIR      NC COLONOSCOPY FLX DX W/COLLJ SPEC WHEN PFRMD N/A 5/7/2018    Procedure: COLONOSCOPY;  Surgeon: Amaya Duran MD;  Location: [facility]; Service: Colorectal    NC LAP, SURG PROCTOPEXY W/SIG RESECT N/A 5/8/2018    Procedure: LAPAROSCOPIC HAND-ASSIST REPAIR OF RECTAL PROLAPSE BY Aria Clemons;  Surgeon: Amaya Duran MD;  Location: [facility];  Service: Colorectal    NC SIGMOIDOSCOPY FLX DX W/COLLJ SPEC BR/WA IF PFRMD N/A 6/19/2018    Procedure: Mi Blevins;  Surgeon: Amaya Duran MD;  [facility]: [facility];   Service: Colorectal    SURGERY SCROTAL / TESTICULAR         Current Outpatient Medications   Medication Sig Dispense Refill    diphenhydrAMINE HCl (ELIAN-SELTZER PLUS ALLERGY PO) Take by mouth      DM-Doxylamine-Acetaminophen (NYQUIL COLD & FLU PO) Take by mouth      docusate sodium (COLACE) 100 mg capsule Take 1 capsule (100 mg total) by mouth 2 (two) times a day 10 capsule 0    amoxicillin (AMOXIL) 875 mg tablet Take 1 tablet (875 mg total) by mouth 2 (two) times a day for 7 days 14 tablet 0     No current facility-administered medications for this visit  No Known Allergies    Review of Systems   Constitutional: Positive for chills, fatigue and fever  HENT: Positive for congestion, ear pain, postnasal drip and sore throat  Eyes: Negative  Respiratory: Positive for cough  Negative for shortness of breath  Cardiovascular: Negative for chest pain and palpitations  Gastrointestinal: Negative for abdominal distention and abdominal pain  Endocrine: Negative  Genitourinary: Negative for difficulty urinating and flank pain  Musculoskeletal: Negative for arthralgias, gait problem and myalgias  Skin: Negative  Neurological: Positive for headaches  Negative for dizziness  Vitals:    09/08/20 0840   Temp: 99 9 °F (37 7 °C)   Weight: 86 2 kg (190 lb)   Height: 6' 2" (1 88 m)         I spent 15 minutes directly with the patient during this visit    [address], Susan. \[facility] Outlook.\"" acknowledges that he has consented to an online visit or consultation  He understands that the online visit is based solely on information provided by him, and that, in the absence of a face-to-face physical evaluation by the physician, the diagnosis he receives is both limited and provisional in terms of accuracy and completeness  This is not intended to replace a full medical face-to-face evaluation by the physician  Luciana Johnson understands and accepts these terms

## 2020-09-09 LAB — SARS-COV-2 RNA SPEC QL NAA+PROBE: NOT DETECTED

## 2020-09-17 ENCOUNTER — TELEPHONE (OUTPATIENT)
Dept: FAMILY MEDICINE CLINIC | Facility: CLINIC | Age: 30
End: 2020-09-17

## 2020-09-17 NOTE — TELEPHONE ENCOUNTER
Patient called as they were seen virtually last week for sore throat and fever, were tested for covid which came back negative then was given a round of antibiotics that they finished 2 days ago but today the sorethroat is back with whitespots in back of throat and is wondering what potential next steps can be

## 2020-09-18 ENCOUNTER — OFFICE VISIT (OUTPATIENT)
Dept: FAMILY MEDICINE CLINIC | Facility: CLINIC | Age: 30
End: 2020-09-18
Payer: MEDICARE

## 2020-09-18 VITALS
RESPIRATION RATE: 14 BRPM | HEIGHT: 74 IN | HEART RATE: 99 BPM | TEMPERATURE: 97.2 F | DIASTOLIC BLOOD PRESSURE: 58 MMHG | OXYGEN SATURATION: 97 % | WEIGHT: 193 LBS | SYSTOLIC BLOOD PRESSURE: 100 MMHG | BODY MASS INDEX: 24.77 KG/M2

## 2020-09-18 DIAGNOSIS — J01.11 ACUTE RECURRENT FRONTAL SINUSITIS: ICD-10-CM

## 2020-09-18 DIAGNOSIS — J02.9 ACUTE VIRAL PHARYNGITIS: Primary | ICD-10-CM

## 2020-09-18 LAB
S PYO AG THROAT QL: NEGATIVE
SL AMB POCT RAPID FLU A: NORMAL
SL AMB POCT RAPID FLU B: NORMAL

## 2020-09-18 PROCEDURE — 99213 OFFICE O/P EST LOW 20 MIN: CPT | Performed by: NURSE PRACTITIONER

## 2020-09-18 PROCEDURE — 87880 STREP A ASSAY W/OPTIC: CPT | Performed by: NURSE PRACTITIONER

## 2020-09-18 PROCEDURE — 87804 INFLUENZA ASSAY W/OPTIC: CPT | Performed by: NURSE PRACTITIONER

## 2020-09-18 RX ORDER — CEPHALEXIN 500 MG/1
500 CAPSULE ORAL EVERY 8 HOURS SCHEDULED
Qty: 30 CAPSULE | Refills: 0 | Status: SHIPPED | OUTPATIENT
Start: 2020-09-18 | End: 2020-09-28

## 2020-09-18 RX ORDER — MONTELUKAST SODIUM 10 MG/1
10 TABLET ORAL
Qty: 30 TABLET | Refills: 1 | Status: SHIPPED | OUTPATIENT
Start: 2020-09-18 | End: 2021-03-23 | Stop reason: ALTCHOICE

## 2020-09-18 RX ORDER — CROMOLYN SODIUM 5.2 MG
1 AEROSOL, SPRAY WITH PUMP (ML) NASAL 4 TIMES DAILY
Qty: 26 ML | Refills: 1 | Status: SHIPPED | OUTPATIENT
Start: 2020-09-18 | End: 2021-04-06 | Stop reason: ALTCHOICE

## 2020-09-18 NOTE — PROGRESS NOTES
Assessment/Plan:    Was seen and treated with [id number] has been recurrent issue  Labs reviewed and stable   No checked for mono thus far will go ahead and do that   I will check for strep and flu in office   He was screened for COVID and negative     Has a lot of swelling and pain and erythema with white paches to the back of the throat   A lot of post nasal dripping   Will start Keflex for 10 days   Cromolyn spray and allergy medication if not better   Mono test and ENT follow up   Will need repeat urine            Problem List Items Addressed This Visit     None      Visit Diagnoses     Acute viral pharyngitis    -  Primary    Relevant Medications    cephalexin (KEFLEX) 500 mg capsule    montelukast (SINGULAIR) 10 mg tablet    cromolyn (NASALCHROM) 5 2 MG/ACT nasal spray    Other Relevant Orders    POCT rapid strep A    POCT rapid flu A and B    Mononucleosis screen    UA (URINE) with reflex to Scope    Ambulatory Referral to Otolaryngology    Acute recurrent frontal sinusitis        Relevant Medications    montelukast (SINGULAIR) 10 mg tablet    cromolyn (NASALCHROM) 5 2 MG/ACT nasal spray            Subjective:      Patient ID: Nadiya Trevino is a 27 y o  male  Presents in office for follow up recurrent sinus and sore throat   Was seen and treated with [id number] has been recurrent issue  Labs reviewed and stable   No checked for mono thus far will go ahead and do that   I will check for strep and flu in office   He was screened for COVID and negative     Has a lot of swelling and pain and erythema with white paches to the back of the throat   A lot of post nasal dripping         The following portions of the patient's history were reviewed and updated as appropriate:   He has a past medical history of Asperger's syndrome, GERD (gastroesophageal reflux disease), Rectal prolapse, and Rectal prolapse (5/9/2018)  ,  does not have any pertinent problems on file  ,   has a past surgical history that includes Hernia repair; Surgery scrotal / testicular; pr colonoscopy flx dx w/collj spec when pfrmd (N/A, 5/7/2018); pr lap, surg proctopexy w/sig resect (N/A, 5/8/2018); and pr sigmoidoscopy flx dx w/collj spec br/wa if pfrmd (N/A, 6/19/2018)  ,  Family history is unknown by patient  ,   reports that he has been smoking  He has been smoking about 0 50 packs per day  He has never used smokeless tobacco  He reports that he does not drink alcohol or use drugs  ,  has No Known Allergies     Current Outpatient Medications   Medication Sig Dispense Refill    cephalexin (KEFLEX) 500 mg capsule Take 1 capsule (500 mg total) by mouth every 8 (eight) hours for 10 days 30 capsule 0    cromolyn (NASALCHROM) 5 2 MG/ACT nasal spray 1 spray into each nostril 4 (four) times a day for 10 days 26 mL 1    diphenhydrAMINE HCl (ELIAN-SELTZER PLUS ALLERGY PO) Take by mouth      DM-Doxylamine-Acetaminophen (NYQUIL COLD & FLU PO) Take by mouth      docusate sodium (COLACE) 100 mg capsule Take 1 capsule (100 mg total) by mouth 2 (two) times a day 10 capsule 0    montelukast (SINGULAIR) 10 mg tablet Take 1 tablet (10 mg total) by mouth daily at bedtime 30 tablet 1     No current facility-administered medications for this visit  Review of Systems   Constitutional: Positive for fatigue  Negative for chills and fever  HENT: Positive for congestion, ear pain, postnasal drip and sore throat  Eyes: Negative  Respiratory: Negative for cough and shortness of breath  Cardiovascular: Negative for chest pain and palpitations  Gastrointestinal: Negative for abdominal distention and abdominal pain  Endocrine: Negative  Genitourinary: Negative for difficulty urinating and flank pain  Urinary issues much improved    Musculoskeletal: Negative for arthralgias, gait problem and myalgias  Skin: Negative  Neurological: Positive for headaches  Negative for dizziness           Objective:  Vitals:    09/18/20 1014   BP: 100/58   BP Location: Left arm   Patient Position: Sitting   Cuff Size: Standard   Pulse: 99   Resp: 14   Temp: (!) 97 2 °F (36 2 °C)   TempSrc: Tympanic   SpO2: 97%   Weight: 87 5 kg (193 lb)   Height: 6' 2" (1 88 m)     Body mass index is 24 78 kg/m²  Physical Exam  Vitals signs and nursing note reviewed  Constitutional:       Appearance: He is well-developed  HENT:      Right Ear: Tympanic membrane is erythematous  Left Ear: Tympanic membrane is erythematous  Mouth/Throat:      Pharynx: Pharyngeal swelling, oropharyngeal exudate and posterior oropharyngeal erythema present  Tonsils: 2+ on the right  2+ on the left  Neck:      Musculoskeletal: Normal range of motion  Cardiovascular:      Rate and Rhythm: Normal rate and regular rhythm  Pulmonary:      Effort: Pulmonary effort is normal    Abdominal:      General: Bowel sounds are normal       Palpations: Abdomen is soft  Lymphadenopathy:      Cervical: Cervical adenopathy present  Skin:     General: Skin is warm and dry  Neurological:      General: No focal deficit present  Mental Status: He is alert and oriented to person, place, and time     Psychiatric:         Mood and Affect: Mood normal          Behavior: Behavior normal

## 2020-09-18 NOTE — PATIENT INSTRUCTIONS

## 2020-09-18 NOTE — TELEPHONE ENCOUNTER
Please bring patient in today for strep and flu testing   The antibiotic would have covered strep but lets check him out   May be [place] , but he needs to be seen

## 2020-09-21 ENCOUNTER — TELEPHONE (OUTPATIENT)
Dept: FAMILY MEDICINE CLINIC | Facility: CLINIC | Age: 30
End: 2020-09-21

## 2020-09-21 NOTE — TELEPHONE ENCOUNTER
Pt called and is concerned over the new Rx of montelukast (SINGULAIR) 10 mg tablet  He was reading that pt's that have any mental issues should not be taking this medication and he has Autism  Please advise

## 2020-09-21 NOTE — TELEPHONE ENCOUNTER
Will not affect autism -- > he can take half the dose if  He is worried   This is for now until we figure out what is going on with his sinuses --> once all issues resolve he can stop

## 2020-09-25 ENCOUNTER — TRANSCRIBE ORDERS (OUTPATIENT)
Dept: LAB | Facility: CLINIC | Age: 30
End: 2020-09-25

## 2020-09-25 ENCOUNTER — APPOINTMENT (OUTPATIENT)
Dept: LAB | Facility: CLINIC | Age: 30
End: 2020-09-25
Payer: MEDICARE

## 2020-09-25 DIAGNOSIS — J02.9 ACUTE VIRAL PHARYNGITIS: ICD-10-CM

## 2020-09-25 LAB
BILIRUB UR QL STRIP: NEGATIVE
CLARITY UR: NORMAL
COLOR UR: NORMAL
GLUCOSE UR STRIP-MCNC: NEGATIVE MG/DL
HETEROPH AB SER QL: NEGATIVE
HGB UR QL STRIP.AUTO: NEGATIVE
KETONES UR STRIP-MCNC: NEGATIVE MG/DL
LEUKOCYTE ESTERASE UR QL STRIP: NEGATIVE
NITRITE UR QL STRIP: NEGATIVE
PH UR STRIP.AUTO: 7 [PH]
PROT UR STRIP-MCNC: NEGATIVE MG/DL
SP GR UR STRIP.AUTO: 1.02 (ref 1–1.03)
UROBILINOGEN UR QL STRIP.AUTO: 1 E.U./DL

## 2020-09-25 PROCEDURE — 81003 URINALYSIS AUTO W/O SCOPE: CPT | Performed by: NURSE PRACTITIONER

## 2020-09-25 PROCEDURE — 86308 HETEROPHILE ANTIBODY SCREEN: CPT

## 2020-09-25 PROCEDURE — 36415 COLL VENOUS BLD VENIPUNCTURE: CPT

## 2020-11-09 ENCOUNTER — OFFICE VISIT (OUTPATIENT)
Dept: FAMILY MEDICINE CLINIC | Facility: CLINIC | Age: 30
End: 2020-11-09
Payer: MEDICARE

## 2020-11-09 VITALS
DIASTOLIC BLOOD PRESSURE: 86 MMHG | HEIGHT: 72 IN | OXYGEN SATURATION: 98 % | SYSTOLIC BLOOD PRESSURE: 110 MMHG | HEART RATE: 99 BPM | RESPIRATION RATE: 16 BRPM | TEMPERATURE: 98.4 F | BODY MASS INDEX: 26.01 KG/M2 | WEIGHT: 192 LBS

## 2020-11-09 DIAGNOSIS — J02.9 SORETHROAT: Primary | ICD-10-CM

## 2020-11-09 DIAGNOSIS — R13.14 PHARYNGOESOPHAGEAL DYSPHAGIA: ICD-10-CM

## 2020-11-09 DIAGNOSIS — J02.9 ACUTE PHARYNGITIS, UNSPECIFIED ETIOLOGY: ICD-10-CM

## 2020-11-09 DIAGNOSIS — J30.89 ENVIRONMENTAL AND SEASONAL ALLERGIES: ICD-10-CM

## 2020-11-09 DIAGNOSIS — R19.7 DIARRHEA, UNSPECIFIED TYPE: ICD-10-CM

## 2020-11-09 LAB — S PYO AG THROAT QL: NEGATIVE

## 2020-11-09 PROCEDURE — 87880 STREP A ASSAY W/OPTIC: CPT | Performed by: NURSE PRACTITIONER

## 2020-11-09 PROCEDURE — 99213 OFFICE O/P EST LOW 20 MIN: CPT | Performed by: NURSE PRACTITIONER

## 2020-11-10 PROBLEM — J30.89 ENVIRONMENTAL AND SEASONAL ALLERGIES: Status: ACTIVE | Noted: 2020-11-10

## 2020-11-10 PROBLEM — J02.9 SORETHROAT: Status: ACTIVE | Noted: 2020-11-10

## 2020-11-10 PROBLEM — J02.9 ACUTE PHARYNGITIS: Status: ACTIVE | Noted: 2020-11-10

## 2020-11-10 PROBLEM — R13.14 PHARYNGOESOPHAGEAL DYSPHAGIA: Status: ACTIVE | Noted: 2020-11-10

## 2021-02-11 ENCOUNTER — TELEMEDICINE (OUTPATIENT)
Dept: FAMILY MEDICINE CLINIC | Facility: CLINIC | Age: 31
End: 2021-02-11
Payer: MEDICARE

## 2021-02-11 DIAGNOSIS — F17.200 SMOKING ADDICTION: ICD-10-CM

## 2021-02-11 DIAGNOSIS — R63.8 INCREASED BMI: Primary | ICD-10-CM

## 2021-02-11 DIAGNOSIS — R63.4 WEIGHT LOSS, UNINTENTIONAL: ICD-10-CM

## 2021-02-11 PROCEDURE — 99214 OFFICE O/P EST MOD 30 MIN: CPT | Performed by: FAMILY MEDICINE
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
## 2021-03-23 ENCOUNTER — TELEPHONE (OUTPATIENT)
Dept: FAMILY MEDICINE CLINIC | Facility: CLINIC | Age: 31
End: 2021-03-23

## 2021-03-23 ENCOUNTER — OFFICE VISIT (OUTPATIENT)
Dept: FAMILY MEDICINE CLINIC | Facility: CLINIC | Age: 31
End: 2021-03-23
Payer: MEDICARE

## 2021-03-23 VITALS
TEMPERATURE: 97.4 F | RESPIRATION RATE: 16 BRPM | SYSTOLIC BLOOD PRESSURE: 118 MMHG | HEART RATE: 73 BPM | OXYGEN SATURATION: 98 % | BODY MASS INDEX: 22.84 KG/M2 | DIASTOLIC BLOOD PRESSURE: 66 MMHG | HEIGHT: 74 IN | WEIGHT: 178 LBS

## 2021-03-23 DIAGNOSIS — R05.9 COUGH: ICD-10-CM

## 2021-03-23 DIAGNOSIS — B37.89: Primary | ICD-10-CM

## 2021-03-23 DIAGNOSIS — R23.8 SKIN PIMPLE: ICD-10-CM

## 2021-03-23 PROCEDURE — 99214 OFFICE O/P EST MOD 30 MIN: CPT | Performed by: NURSE PRACTITIONER

## 2021-03-23 RX ORDER — NYSTATIN 100000 U/G
OINTMENT TOPICAL 2 TIMES DAILY
Qty: 30 G | Refills: 0 | Status: SHIPPED | OUTPATIENT
Start: 2021-03-23 | End: 2021-05-26

## 2021-03-23 RX ORDER — CEPHALEXIN 500 MG/1
500 CAPSULE ORAL EVERY 8 HOURS SCHEDULED
Qty: 21 CAPSULE | Refills: 0 | Status: SHIPPED | OUTPATIENT
Start: 2021-03-23 | End: 2021-03-30

## 2021-03-23 NOTE — PROGRESS NOTES
BMI Counseling: Body mass index is 22 85 kg/m²  The BMI is above normal  Nutrition recommendations include decreasing portion sizes, encouraging healthy choices of fruits and vegetables, decreasing fast food intake, consuming healthier snacks, limiting drinks that contain sugar, moderation in carbohydrate intake, increasing intake of lean protein, reducing intake of saturated and trans fat and reducing intake of cholesterol  Exercise recommendations include exercising 3-5 times per week and strength training exercises  No pharmacotherapy was ordered  Patient referred to PCP BMI is normal       Tobacco Cessation Counseling: Tobacco cessation counseling was provided  The patient is sincerely urged to quit consumption of tobacco  He is not ready to quit tobacco  Medication options and side effects of medication discussed  Assessment/Plan:    Presents in office for redness and rash inside of the nose- b/l nares and outside  Positive for redness and swelling and peeling skin   Started with upper respiratory congestion and sinus congestion productive sputum and now he has  Some cough that is also productive  This started few weeks back     Discussed treatment plan and follow up in 2 week   Discussed cleaning and flushing nose with saline   Warm compressors and cleaning the skin prior to reapplying the creams       If worsening of symptoms or if nares are closing up he is to call me and we will refer further   Otherwise I will see him in 2 weeks        Problem List Items Addressed This Visit     None      Visit Diagnoses     Infection of nasal cavity due to Candida species    -  Primary    Relevant Medications    mupirocin (BACTROBAN) 2 % ointment    nystatin (MYCOSTATIN) ointment    cephalexin (KEFLEX) 500 mg capsule    Skin pimple        to b/l nares    Relevant Medications    mupirocin (BACTROBAN) 2 % ointment    nystatin (MYCOSTATIN) ointment    cephalexin (KEFLEX) 500 mg capsule    Cough Subjective:      Patient ID: Jey Amador is a 32 y o  male  Patient of Dr Harsh Pritchett in office for redness and rash inside of the nose- b/l nares and outside  Positive for redness and swelling and peeling skin   Started with upper respiratory congestion and sinus congestion productive sputum and now he has  Some cough that is also productive  Negative for fevers or chills denies headaches     This started few weeks back           The following portions of the patient's history were reviewed and updated as appropriate:   Past Medical History:  He has a past medical history of Asperger's syndrome, GERD (gastroesophageal reflux disease), Rectal prolapse, and Rectal prolapse (5/9/2018)  ,  _______________________________________________________________________  Medical Problems:  does not have any pertinent problems on file ,  _______________________________________________________________________  Past Surgical History:   has a past surgical history that includes Hernia repair; Surgery scrotal / testicular; pr colonoscopy flx dx w/collj spec when pfrmd (N/A, 5/7/2018); pr lap, surg proctopexy w/sig resect (N/A, 5/8/2018); and pr sigmoidoscopy flx dx w/collj spec br/wa if pfrmd (N/A, 6/19/2018)  ,  _______________________________________________________________________  Family History:  Family history is unknown by patient  ,  _______________________________________________________________________  Social History:   reports that he has been smoking  He has been smoking about 0 50 packs per day  He has never used smokeless tobacco  He reports that he does not drink alcohol or use drugs  ,  _______________________________________________________________________  Allergies:  has No Known Allergies     _______________________________________________________________________  Current Outpatient Medications   Medication Sig Dispense Refill    diphenhydrAMINE HCl (ELIAN-GREG PLUS ALLERGY PO) Take by mouth  cephalexin (KEFLEX) 500 mg capsule Take 1 capsule (500 mg total) by mouth every 8 (eight) hours for 7 days 21 capsule 0    cromolyn (NASALCHROM) 5 2 MG/ACT nasal spray 1 spray into each nostril 4 (four) times a day for 10 days (Patient not taking: Reported on 3/23/2021) 26 mL 1    DM-Doxylamine-Acetaminophen (NYQUIL COLD & FLU PO) Take by mouth      mupirocin (BACTROBAN) 2 % ointment Apply topically 3 (three) times a day 22 g 0    nystatin (MYCOSTATIN) ointment Apply topically 2 (two) times a day 30 g 0     No current facility-administered medications for this visit       _______________________________________________________________________  Review of Systems   Constitutional: Negative for chills and fever  HENT: Positive for congestion, postnasal drip and sinus pressure  Nasal skin infection to the nares and tip of the nose    Respiratory: Positive for cough  Negative for shortness of breath  Cardiovascular: Negative for chest pain and palpitations  Genitourinary: Negative for difficulty urinating  Allergic/Immunologic: Positive for environmental allergies  Neurological: Negative for headaches  Objective:  Vitals:    03/23/21 0831   BP: 118/66   BP Location: Left arm   Patient Position: Sitting   Cuff Size: Standard   Pulse: 73   Resp: 16   Temp: (!) 97 4 °F (36 3 °C)   TempSrc: Temporal   SpO2: 98%   Weight: 80 7 kg (178 lb)   Height: 6' 2" (1 88 m)     Body mass index is 22 85 kg/m²  Physical Exam  Vitals signs and nursing note reviewed  Constitutional:       Appearance: Normal appearance  Comments: BMI 22 85    HENT:      Nose: Congestion present  Comments: B/l nares skin infection      Mouth/Throat:      Mouth: Mucous membranes are dry  Pharynx: Oropharyngeal exudate and posterior oropharyngeal erythema present  Neck:      Musculoskeletal: Normal range of motion     Pulmonary:      Effort: Pulmonary effort is normal       Breath sounds: Normal breath sounds  Musculoskeletal:         General: No swelling or tenderness  Skin:     Findings: Erythema and rash present  Comments: To nares and nose    Neurological:      Mental Status: He is alert and oriented to person, place, and time     Psychiatric:         Mood and Affect: Mood normal          Behavior: Behavior normal

## 2021-03-23 NOTE — PATIENT INSTRUCTIONS
Skin Yeast Infection   WHAT YOU NEED TO KNOW:   Yeast is normally present on the skin  Infection happens when you have too much yeast, or when it gets into a cut on your skin  Certain types of mold and fungus can cause a yeast infection  A skin yeast infection can appear anywhere on your skin or nail beds  Skin yeast infections are usually found on warm, moist parts of the body  Examples include between skin folds or under the breasts  DISCHARGE INSTRUCTIONS:   Return to the emergency department if:   · You have signs of infection, such as pus, warmth or red streaks coming from the wound, or a fever  Contact your healthcare provider if:   · Your symptoms worsen or do not get better within 7 to 10 days  · You have new or returning signs of a skin yeast infection after treatment  · You have questions or concerns about your condition or care  Medicines:   · Antifungal medicine  may be given as a cream, ointment, or pill  · Take your medicine as directed  Contact your healthcare provider if you think your medicine is not helping or if you have side effects  Tell him or her if you are allergic to any medicine  Keep a list of the medicines, vitamins, and herbs you take  Include the amounts, and when and why you take them  Bring the list or the pill bottles to follow-up visits  Carry your medicine list with you in case of an emergency  Care for the skin near the infection:  You may only have discolored patches of skin, or areas that are dry and flaking  Care for these skin problems as directed by your healthcare provider  If you have painful skin or an open sore, you will need to protect the skin and prevent damage  You will also need to keep the skin dry as much as possible  Ask your healthcare provider how to care for your skin while the infection clears  The following are general guidelines for caring for painful or open skin:  · Keep the skin clean    Ask your healthcare provider if you should wash with mild soap and water  Do not use soap that contains alcohol  Alcohol can dry and irritate the skin and make symptoms worse  Your baby's healthcare provider may tell you to use diaper cream or ointment when you change his diaper  This will protect the skin and prevent moisture from collecting  · Keep the skin dry  Pat the area dry with a towel  Do not rub, because this may irritate the skin  If you have a skin yeast infection between skin folds, lift the top part gently and hold it while you dry between your skin folds  Always dry your feet completely after you swim or bathe, including between your toes  Dry your skin if you are sweating from exercise or exposure to heat  Use a clean towel each time to prevent spreading or continuing the infection  · Keep the skin protected  Ask your healthcare provider if you should cover the area with a bandage or leave it open  Check your skin each day to make sure you do not have new or worsening problems  You may need to have someone check the skin if you cannot see the area easily  Prevent another skin yeast infection:   · Do not share clothing or towels    · Wear shower shoes if you need to use a public shower    · Dry your feet completely after you bathe, and apply antifungal powder or cream as directed    · Put on socks before you get dressed so you do not spread fungus from your feet    · Wear light clothing that allows air to get to your skin    · Manage your weight to prevent skin folds where yeast can collect    · Manage diabetes    · Change your baby's diaper often, and keep the area clean and dry as much as possible    · Use a diaper cream or ointment that contains zinc oxide or dimethicone on your baby's diaper area as directed    Follow up with your healthcare provider as directed:  Write down your questions so you remember to ask them during your visits     © Copyright [employer] 2020 Information is for End User's use only and may not be sold, redistributed or otherwise used for commercial purposes  All illustrations and images included in CareNotes® are the copyrighted property of [employer]  or Alexandra Zimmerman  The above information is an  only  It is not intended as medical advice for individual conditions or treatments  Talk to your doctor, nurse or pharmacist before following any medical regimen to see if it is safe and effective for you

## 2021-03-23 NOTE — TELEPHONE ENCOUNTER
Pt called - Mupirocin or Nystatin ointment - which med is ok to use inside the nose - both say external use only

## 2021-04-06 ENCOUNTER — OFFICE VISIT (OUTPATIENT)
Dept: FAMILY MEDICINE CLINIC | Facility: CLINIC | Age: 31
End: 2021-04-06
Payer: MEDICARE

## 2021-04-06 VITALS
RESPIRATION RATE: 16 BRPM | HEIGHT: 74 IN | BODY MASS INDEX: 22.87 KG/M2 | SYSTOLIC BLOOD PRESSURE: 118 MMHG | HEART RATE: 59 BPM | TEMPERATURE: 97.1 F | OXYGEN SATURATION: 98 % | WEIGHT: 178.2 LBS | DIASTOLIC BLOOD PRESSURE: 68 MMHG

## 2021-04-06 DIAGNOSIS — E78.2 MIXED HYPERLIPIDEMIA: ICD-10-CM

## 2021-04-06 DIAGNOSIS — F17.200 SMOKER: ICD-10-CM

## 2021-04-06 DIAGNOSIS — F84.0 AUTISM SPECTRUM: ICD-10-CM

## 2021-04-06 DIAGNOSIS — Z23 ENCOUNTER FOR IMMUNIZATION: ICD-10-CM

## 2021-04-06 DIAGNOSIS — Z00.00 MEDICARE ANNUAL WELLNESS VISIT, SUBSEQUENT: ICD-10-CM

## 2021-04-06 DIAGNOSIS — R63.8 INCREASED BMI: Primary | ICD-10-CM

## 2021-04-06 DIAGNOSIS — Z86.010 PERSONAL HISTORY OF COLONIC POLYPS: ICD-10-CM

## 2021-04-06 DIAGNOSIS — L08.9 SKIN INFECTION: ICD-10-CM

## 2021-04-06 PROBLEM — R13.14 PHARYNGOESOPHAGEAL DYSPHAGIA: Status: RESOLVED | Noted: 2020-11-10 | Resolved: 2021-04-06

## 2021-04-06 PROBLEM — J02.9 SORETHROAT: Status: RESOLVED | Noted: 2020-11-10 | Resolved: 2021-04-06

## 2021-04-06 PROBLEM — J02.9 ACUTE PHARYNGITIS: Status: RESOLVED | Noted: 2020-11-10 | Resolved: 2021-04-06

## 2021-04-06 PROCEDURE — G0009 ADMIN PNEUMOCOCCAL VACCINE: HCPCS | Performed by: NURSE PRACTITIONER

## 2021-04-06 PROCEDURE — G0439 PPPS, SUBSEQ VISIT: HCPCS | Performed by: NURSE PRACTITIONER

## 2021-04-06 PROCEDURE — 99213 OFFICE O/P EST LOW 20 MIN: CPT | Performed by: NURSE PRACTITIONER

## 2021-04-06 PROCEDURE — 90715 TDAP VACCINE 7 YRS/> IM: CPT | Performed by: NURSE PRACTITIONER

## 2021-04-06 PROCEDURE — 90471 IMMUNIZATION ADMIN: CPT | Performed by: NURSE PRACTITIONER

## 2021-04-06 PROCEDURE — 90732 PPSV23 VACC 2 YRS+ SUBQ/IM: CPT | Performed by: NURSE PRACTITIONER

## 2021-04-06 NOTE — PROGRESS NOTES
BMI Counseling: Body mass index is 22 88 kg/m²  The BMI is above normal  Nutrition recommendations include decreasing portion sizes, encouraging healthy choices of fruits and vegetables, decreasing fast food intake, consuming healthier snacks, limiting drinks that contain sugar, moderation in carbohydrate intake, increasing intake of lean protein, reducing intake of saturated and trans fat and reducing intake of cholesterol  Exercise recommendations include exercising 3-5 times per week and strength training exercises  No pharmacotherapy was ordered  BMI within range 22 88       Depression Screening and Follow-up Plan: Patient's depression screening was positive with a PHQ-2 score of 2  Clincally patient does not have depression  No treatment is required  Tobacco Cessation Counseling: Tobacco cessation counseling was provided  The patient is sincerely urged to quit consumption of tobacco  He is not ready to quit tobacco  Medication options and side effects of medication discussed  Patient agreed to medication  Will discuss further at follow up when ready to quit   Assessment/Plan:    Presents in office for follow up sinus infection and nasal skin infection     this has been somewhat of a recurrent issue   Last infection treated 2 weeks ago much improved he has animals at home so hygiene is very important and we will get him updated with his TDAP  Feels better   Denies any congestion or sinus pressure and denies any headaches       Will add medicare wellness today          Problem List Items Addressed This Visit        Other    Personal history of colonic polyps    Relevant Orders    Ambulatory referral to Gastroenterology      Other Visit Diagnoses     Increased BMI    -  Primary    Relevant Orders    Comprehensive metabolic panel    TSH, 3rd generation    Lipid panel    UA (URINE) with reflex to Scope    CBC and differential    Mixed hyperlipidemia        Relevant Orders    Comprehensive metabolic panel TSH, 3rd generation    Lipid panel    UA (URINE) with reflex to Scope    Smoker        Relevant Orders    Comprehensive metabolic panel    TSH, 3rd generation    Lipid panel    UA (URINE) with reflex to Scope    CBC and differential    Ambulatory referral to Gastroenterology            Subjective:      Patient ID: Aly Bethea is a 32 y o  male  Presents in office for follow up Nasal Cavity infection   Much improved and resolved   Negative for congestion or sinus pressure     Will do medicare wellness today       The following portions of the patient's history were reviewed and updated as appropriate:   Past Medical History:  He has a past medical history of Asperger's syndrome, GERD (gastroesophageal reflux disease), Rectal prolapse, and Rectal prolapse (5/9/2018)  ,  _______________________________________________________________________  Medical Problems:  does not have any pertinent problems on file ,  _______________________________________________________________________  Past Surgical History:   has a past surgical history that includes Hernia repair; Surgery scrotal / testicular; pr colonoscopy flx dx w/collj spec when pfrmd (N/A, 5/7/2018); pr lap, surg proctopexy w/sig resect (N/A, 5/8/2018); and pr sigmoidoscopy flx dx w/collj spec br/wa if pfrmd (N/A, 6/19/2018)  ,  _______________________________________________________________________  Family History:  Family history is unknown by patient  ,  _______________________________________________________________________  Social History:   reports that he has been smoking  He has been smoking about 0 50 packs per day  He has never used smokeless tobacco  He reports that he does not drink alcohol or use drugs  ,  _______________________________________________________________________  Allergies:  has No Known Allergies     _______________________________________________________________________  Current Outpatient Medications   Medication Sig Dispense Refill  diphenhydrAMINE HCl (ELIAN-SELTZER PLUS ALLERGY PO) Take by mouth      DM-Doxylamine-Acetaminophen (NYQUIL COLD & FLU PO) Take by mouth      mupirocin (BACTROBAN) 2 % ointment Apply topically 3 (three) times a day 22 g 0    nystatin (MYCOSTATIN) ointment Apply topically 2 (two) times a day 30 g 0    cromolyn (NASALCHROM) 5 2 MG/ACT nasal spray 1 spray into each nostril 4 (four) times a day for 10 days (Patient not taking: Reported on 3/23/2021) 26 mL 1     No current facility-administered medications for this visit       _______________________________________________________________________  Review of Systems   Constitutional: Negative for chills and fever  HENT: Negative for congestion, postnasal drip and sinus pressure  Nasal skin infection much improved    Respiratory: Negative for cough and shortness of breath  Cardiovascular: Negative for chest pain and palpitations  Gastrointestinal: Negative for abdominal distention, constipation and vomiting  Genitourinary: Negative for difficulty urinating  Musculoskeletal: Negative  Skin: Negative  Allergic/Immunologic: Positive for environmental allergies  Neurological: Negative for headaches  Psychiatric/Behavioral: Negative for sleep disturbance and suicidal ideas  The patient is nervous/anxious  Objective:  Vitals:    04/06/21 0809   BP: 118/68   BP Location: Left arm   Patient Position: Sitting   Cuff Size: Standard   Pulse: 59   Resp: 16   Temp: (!) 97 1 °F (36 2 °C)   TempSrc: Temporal   SpO2: 98%   Weight: 80 8 kg (178 lb 3 2 oz)   Height: 6' 2" (1 88 m)     Body mass index is 22 88 kg/m²  Physical Exam  Vitals signs and nursing note reviewed  Constitutional:       Appearance: Normal appearance  Comments: BMI 22 85    HENT:      Head: Normocephalic  Nose: No congestion  Comments: B/l nares skin infection      Mouth/Throat:      Mouth: Mucous membranes are dry        Pharynx: No oropharyngeal exudate or posterior oropharyngeal erythema  Neck:      Musculoskeletal: Normal range of motion  Pulmonary:      Effort: Pulmonary effort is normal       Breath sounds: Normal breath sounds  Abdominal:      Palpations: Abdomen is soft  Musculoskeletal:         General: No swelling or tenderness  Skin:     Capillary Refill: Capillary refill takes less than 2 seconds  Findings: No erythema or rash  Comments: Nares infection all resolved    Neurological:      Mental Status: He is alert and oriented to person, place, and time     Psychiatric:         Mood and Affect: Mood normal          Behavior: Behavior normal

## 2021-05-08 ENCOUNTER — APPOINTMENT (EMERGENCY)
Dept: RADIOLOGY | Facility: HOSPITAL | Age: 31
End: 2021-05-08
Payer: MEDICARE

## 2021-05-08 ENCOUNTER — HOSPITAL ENCOUNTER (EMERGENCY)
Facility: HOSPITAL | Age: 31
Discharge: HOME | End: 2021-05-08
Attending: EMERGENCY MEDICINE
Payer: MEDICARE

## 2021-05-08 VITALS
TEMPERATURE: 97.8 F | HEART RATE: 75 BPM | RESPIRATION RATE: 18 BRPM | SYSTOLIC BLOOD PRESSURE: 130 MMHG | DIASTOLIC BLOOD PRESSURE: 70 MMHG | OXYGEN SATURATION: 99 %

## 2021-05-08 DIAGNOSIS — R07.89 ACUTE CHEST WALL PAIN: Primary | ICD-10-CM

## 2021-05-08 LAB — SARS-COV-2 RNA RESP QL NAA+PROBE: NEGATIVE

## 2021-05-08 PROCEDURE — 99285 EMERGENCY DEPT VISIT HI MDM: CPT

## 2021-05-08 PROCEDURE — U0003 INFECTIOUS AGENT DETECTION BY NUCLEIC ACID (DNA OR RNA); SEVERE ACUTE RESPIRATORY SYNDROME CORONAVIRUS 2 (SARS-COV-2) (CORONAVIRUS DISEASE [COVID-19]), AMPLIFIED PROBE TECHNIQUE, MAKING USE OF HIGH THROUGHPUT TECHNOLOGIES AS DESCRIBED BY CMS-2020-01-R: HCPCS | Performed by: EMERGENCY MEDICINE

## 2021-05-08 PROCEDURE — 99285 EMERGENCY DEPT VISIT HI MDM: CPT | Performed by: EMERGENCY MEDICINE

## 2021-05-08 PROCEDURE — 71045 X-RAY EXAM CHEST 1 VIEW: CPT

## 2021-05-08 PROCEDURE — 93005 ELECTROCARDIOGRAM TRACING: CPT

## 2021-05-08 PROCEDURE — U0005 INFEC AGEN DETEC AMPLI PROBE: HCPCS | Performed by: EMERGENCY MEDICINE

## 2021-05-08 NOTE — ED PROVIDER NOTES
History  Chief Complaint   Patient presents with    Chest Pain     pt presents with chest pain for weeks and dry cough, states today is worse        History provided by:  Patient  Chest Pain  Pain location:  R chest and R lateral chest  Pain quality: aching    Pain radiates to:  Does not radiate  Pain severity:  Moderate  Onset quality:  Gradual  Duration:  2 days  Timing:  Constant  Progression:  Worsening  Chronicity:  New  Context: lifting, movement and raising an arm    Context: not breathing    Relieved by:  Certain positions  Worsened by:  Certain positions and deep breathing  Ineffective treatments:  None tried  Associated symptoms: cough    Associated symptoms: no abdominal pain, no claudication, no diaphoresis, no dizziness, no fever, no headache, no nausea, no numbness, no palpitations, no shortness of breath and not vomiting        Prior to Admission Medications   Prescriptions Last Dose Informant Patient Reported? Taking? DM-Doxylamine-Acetaminophen (NYQUIL COLD & FLU PO)   Yes No   Sig: Take by mouth   diphenhydrAMINE HCl (ELIAN-SELTZER PLUS ALLERGY PO) 5/7/2021 at Unknown time  Yes Yes   Sig: Take by mouth   mupirocin (BACTROBAN) 2 % ointment   No No   Sig: Apply topically 3 (three) times a day   nystatin (MYCOSTATIN) ointment   No No   Sig: Apply topically 2 (two) times a day      Facility-Administered Medications: None       Past Medical History:   Diagnosis Date    Asperger's syndrome     GERD (gastroesophageal reflux disease)     Rectal prolapse     Rectal prolapse 5/9/2018       Past Surgical History:   Procedure Laterality Date    HERNIA REPAIR      LA COLONOSCOPY FLX DX W/COLLJ SPEC WHEN PFRMD N/A 5/7/2018    Procedure: COLONOSCOPY;  Surgeon: Merle Smith MD;  Location: [facility];   Service: Colorectal    LA LAP, SURG PROCTOPEXY W/SIG RESECT N/A 5/8/2018    Procedure: LAPAROSCOPIC HAND-ASSIST REPAIR OF RECTAL PROLAPSE BY SACRAL  PROTOPEXY;  Surgeon: Merle Smith MD; Location: [facility];  Service: Colorectal    WV SIGMOIDOSCOPY FLX DX W/COLLJ SPEC BR/WA IF PFRMD N/A 6/19/2018    Procedure: Josefina George;  Surgeon: Olaf Nascimento MD;  Location: [facility]; Service: Colorectal    SURGERY SCROTAL / TESTICULAR         Family History   Family history unknown: Yes     I have reviewed and agree with the history as documented  E-Cigarette/Vaping    E-Cigarette Use Never User      E-Cigarette/Vaping Substances    Nicotine No     THC No     CBD No     Flavoring No     Other No     Unknown No      Social History     Tobacco Use    Smoking status: Current Every Day Smoker     Packs/day: 1 00    Smokeless tobacco: Never Used   Substance Use Topics    Alcohol use: No    Drug use: No       Review of Systems   Constitutional: Negative for activity change, chills, diaphoresis and fever  HENT: Negative for congestion, sinus pressure and sore throat  Eyes: Negative for pain and visual disturbance  Respiratory: Positive for cough  Negative for chest tightness, shortness of breath, wheezing and stridor  Cardiovascular: Positive for chest pain  Negative for palpitations and claudication  Gastrointestinal: Negative for abdominal distention, abdominal pain, constipation, diarrhea, nausea and vomiting  Genitourinary: Negative for dysuria and frequency  Musculoskeletal: Negative for neck pain and neck stiffness  Skin: Negative for rash  Neurological: Negative for dizziness, speech difficulty, light-headedness, numbness and headaches  Physical Exam  Physical Exam  Vitals signs reviewed  Constitutional:       General: He is not in acute distress  Appearance: He is well-developed  He is not diaphoretic  HENT:      Head: Normocephalic and atraumatic  Right Ear: External ear normal       Left Ear: External ear normal       Nose: Nose normal    Eyes:      General:         Right eye: No discharge  Left eye: No discharge        Pupils: Pupils are equal, round, and reactive to light  Neck:      Musculoskeletal: Normal range of motion and neck supple  Trachea: No tracheal deviation  Cardiovascular:      Rate and Rhythm: Normal rate and regular rhythm  Heart sounds: Normal heart sounds  No murmur  Pulmonary:      Effort: Pulmonary effort is normal  No respiratory distress  Breath sounds: Normal breath sounds  No stridor  Chest:      Chest wall: Tenderness (Reproducible right-sided chest pain) present  Abdominal:      General: There is no distension  Palpations: Abdomen is soft  Tenderness: There is no abdominal tenderness  There is no guarding or rebound  Musculoskeletal: Normal range of motion  Skin:     General: Skin is warm and dry  Coloration: Skin is not pale  Findings: No erythema  Neurological:      General: No focal deficit present  Mental Status: He is alert and oriented to person, place, and time  Vital Signs  ED Triage Vitals   Temperature Pulse Respirations Blood Pressure SpO2   05/08/21 1811 05/08/21 1810 05/08/21 1810 05/08/21 1810 05/08/21 1810   97 8 °F (36 6 °C) 75 18 130/70 99 %      Temp Source Heart Rate Source Patient Position - Orthostatic VS BP Location FiO2 (%)   05/08/21 1810 05/08/21 1810 -- -- --   Oral Monitor         Pain Score       --                  Vitals:    05/08/21 1810   BP: 130/70   Pulse: 75         Visual Acuity  Visual Acuity      Most Recent Value   L Pupil Size (mm)  3   R Pupil Size (mm)  3          ED Medications  Medications - No data to display    Diagnostic Studies  Results Reviewed     Procedure Component Value Units Date/Time    Novel Coronavirus (Covid-19),PCR [facility] - 24 Hour Routine [[id number]] Collected: 05/08/21 1830    Lab Status:  In process Specimen: Nares from Nose Updated: 05/08/21 1834                 XR chest 1 view portable   ED Interpretation by Todd Perales DO (05/08 1849)   No acute pathology Procedures  ECG 12 Lead Documentation Only    Date/Time: 5/8/2021 7:04 PM  Performed by: Nazario Lane DO  Authorized by: Nazario Lane DO     ECG reviewed by me, the ED Provider: yes    Patient location:  ED  Interpretation:     Interpretation: non-specific    Rate:     ECG rate:  67  Rhythm:     Rhythm: sinus rhythm    Ectopy:     Ectopy: none    QRS:     QRS axis:  Normal    QRS intervals:  Normal  Conduction:     Conduction: normal    ST segments:     ST segments:  Non-specific  T waves:     T waves: non-specific               ED Course                     PERC Rule for PE      Most Recent Value   PERC Rule for PE   Age >=50  0 Filed at: 05/08/2021 1825   HR >=100  0 Filed at: 05/08/2021 1825   O2 Sat on room air < 95%  0 Filed at: 05/08/2021 1825   History of PE or DVT  0 Filed at: 05/08/2021 1825   Recent trauma or surgery  0 Filed at: 05/08/2021 1825   Hemoptysis  0 Filed at: 05/08/2021 1825   Exogenous estrogen  0 Filed at: 05/08/2021 1825   Unilateral leg swelling  0 Filed at: 05/08/2021 1825   PERC Rule for PE Results  0 Filed at: 05/08/2021 1825              SBIRT 22yo+      Most Recent Value   SBIRT (23 yo +)   In order to provide better care to our patients, we are screening all of our patients for alcohol and drug use  Would it be okay to ask you these screening questions? Yes Filed at: 05/08/2021 1816   Initial Alcohol Screen: US AUDIT-C    1  How often do you have a drink containing alcohol?  0 Filed at: 05/08/2021 1816   2  How many drinks containing alcohol do you have on a typical day you are drinking? 0 Filed at: 05/08/2021 1816   3a  Male UNDER 65: How often do you have five or more drinks on one occasion? 0 Filed at: 05/08/2021 1816   3b  FEMALE Any Age, or MALE 65+: How often do you have 4 or more drinks on one occassion? 0 Filed at: 05/08/2021 1816   Audit-C Score  0 Filed at: 05/08/2021 1816   ROCHELLE: How many times in the past year have you       Used an illegal drug or used a prescription medication for non-medical reasons? Never Filed at: 05/08/2021 1816                    MDM  Number of Diagnoses or Management Options  Acute chest wall pain: new and requires workup  Diagnosis management comments:       Initial ED assessment:  80-year-old male, right-sided chest pain, reproducible on examination, worse with palpation and with certain twisting positions  Initial DDx includes but is not limited to:   Musculoskeletal less likely pneumothorax or infectious etiology such as pneumonia or COVID, doubt cardiac etiology, PERC negative making pulmonary embolism unlikely    Initial ED plan:   Chest x-ray EKG COVID testing        Final ED summary/disposition:   After evaluation and workup in the emergency department, workup unremarkable patient DC        Amount and/or Complexity of Data Reviewed  Clinical lab tests: reviewed and ordered  Tests in the radiology section of CPT®: ordered and reviewed  Review and summarize past medical records: yes  Independent visualization of images, tracings, or specimens: yes        Disposition  Final diagnoses:   Acute chest wall pain     Time reflects when diagnosis was documented in both MDM as applicable and the Disposition within this note     Time User Action Codes Description Comment    5/8/2021  6:58 PM Amadro Persaud Add [R07 89] Acute chest wall pain       ED Disposition     ED Disposition Condition Date/Time Comment    Discharge Stable Sat May 8, 2021  6:58 PM Shanika Jay discharge to home/self care  Follow-up Information    None         Patient's Medications   Discharge Prescriptions    No medications on file     No discharge procedures on file      PDMP Review     None          ED Provider  Electronically Signed by           Nola Bridges DO  05/08/21 0726

## 2021-05-10 LAB
ATRIAL RATE: 67 BPM
P AXIS: 65 DEGREES
PR INTERVAL: 134 MS
QRS AXIS: 77 DEGREES
QRSD INTERVAL: 94 MS
QT INTERVAL: 388 MS
QTC INTERVAL: 409 MS
T WAVE AXIS: 58 DEGREES
VENTRICULAR RATE: 67 BPM

## 2021-05-10 PROCEDURE — 93010 ELECTROCARDIOGRAM REPORT: CPT | Performed by: INTERNAL MEDICINE

## 2021-05-26 ENCOUNTER — OFFICE VISIT (OUTPATIENT)
Dept: FAMILY MEDICINE CLINIC | Facility: CLINIC | Age: 31
End: 2021-05-26
Payer: MEDICARE

## 2021-05-26 VITALS
HEART RATE: 100 BPM | WEIGHT: 175.4 LBS | DIASTOLIC BLOOD PRESSURE: 68 MMHG | HEIGHT: 74 IN | BODY MASS INDEX: 22.51 KG/M2 | SYSTOLIC BLOOD PRESSURE: 122 MMHG | TEMPERATURE: 97.9 F | RESPIRATION RATE: 16 BRPM | OXYGEN SATURATION: 96 %

## 2021-05-26 DIAGNOSIS — R09.81 SINUS CONGESTION: ICD-10-CM

## 2021-05-26 DIAGNOSIS — G44.52 NEW DAILY PERSISTENT HEADACHE: ICD-10-CM

## 2021-05-26 DIAGNOSIS — R05.9 COUGH: Primary | ICD-10-CM

## 2021-05-26 PROCEDURE — U0003 INFECTIOUS AGENT DETECTION BY NUCLEIC ACID (DNA OR RNA); SEVERE ACUTE RESPIRATORY SYNDROME CORONAVIRUS 2 (SARS-COV-2) (CORONAVIRUS DISEASE [COVID-19]), AMPLIFIED PROBE TECHNIQUE, MAKING USE OF HIGH THROUGHPUT TECHNOLOGIES AS DESCRIBED BY CMS-2020-01-R: HCPCS | Performed by: NURSE PRACTITIONER

## 2021-05-26 PROCEDURE — 99214 OFFICE O/P EST MOD 30 MIN: CPT | Performed by: NURSE PRACTITIONER

## 2021-05-26 PROCEDURE — U0005 INFEC AGEN DETEC AMPLI PROBE: HCPCS | Performed by: NURSE PRACTITIONER

## 2021-05-26 RX ORDER — AMOXICILLIN 875 MG/1
875 TABLET, COATED ORAL 2 TIMES DAILY
Qty: 14 TABLET | Refills: 0 | Status: SHIPPED | OUTPATIENT
Start: 2021-05-26 | End: 2021-06-02

## 2021-05-26 NOTE — PROGRESS NOTES
Assessment/Plan:    Patient presents in office for sick visit   C o cough congestion and intermittent headaches - frontal pressure headaches  Denies any fevers or chills   Denies any nausea or vomiting  Symptoms started bout 1 week ago however has been battling some seasonal allergies prior to that         Problem List Items Addressed This Visit     None      Visit Diagnoses     Cough    -  Primary    Relevant Medications    amoxicillin (AMOXIL) 875 mg tablet    Other Relevant Orders    Novel Coronavirus (COVID-19), PCR [facility] Collected in Office    New daily persistent headache        Relevant Medications    amoxicillin (AMOXIL) 875 mg tablet    Other Relevant Orders    Novel Coronavirus (COVID-19), PCR [facility] Collected in Office    Sinus congestion        Relevant Medications    amoxicillin (AMOXIL) 875 mg tablet    Other Relevant Orders    Novel Coronavirus (COVID-19), PCR [facility] Collected in Office            Subjective:      Patient ID: Lon Avalos is a 32 y o  male  Patient presents in office for sick visit   C o cough congestion and intermittent headaches - frontal pressure headaches  Denies any fevers or chills   Denies any nausea or vomiting  Symptoms started bout 1 week ago however has been battling some seasonal allergies prior to that            The following portions of the patient's history were reviewed and updated as appropriate:   Past Medical History:  He has a past medical history of Asperger's syndrome, GERD (gastroesophageal reflux disease), Rectal prolapse, and Rectal prolapse (5/9/2018)  ,  _______________________________________________________________________  Medical Problems:  does not have any pertinent problems on file ,  _______________________________________________________________________  Past Surgical History:   has a past surgical history that includes Hernia repair; Surgery scrotal / testicular; pr colonoscopy flx dx w/collj spec when pfrmd (N/A, 5/7/2018); pr lap, surg proctopexy w/sig resect (N/A, 5/8/2018); and pr sigmoidoscopy flx dx w/collj spec br/wa if pfrmd (N/A, 6/19/2018)  ,  _______________________________________________________________________  Family History:  Family history is unknown by patient  ,  _______________________________________________________________________  Social History:   reports that he has been smoking  He has been smoking about 1 00 pack per day  He has never used smokeless tobacco  He reports that he does not drink alcohol or use drugs  ,  _______________________________________________________________________  Allergies:  has No Known Allergies     _______________________________________________________________________  Current Outpatient Medications   Medication Sig Dispense Refill    diphenhydrAMINE HCl (ELIAN-SELTZER PLUS ALLERGY PO) Take by mouth      amoxicillin (AMOXIL) 875 mg tablet Take 1 tablet (875 mg total) by mouth 2 (two) times a day for 7 days 14 tablet 0     No current facility-administered medications for this visit       _______________________________________________________________________  Review of Systems   Constitutional: Positive for fatigue  Negative for chills and fever  HENT: Positive for congestion, sinus pressure and sinus pain  Negative for sore throat  Eyes: Negative  Respiratory: Positive for cough  Negative for shortness of breath  Cardiovascular: Negative for chest pain and palpitations  Gastrointestinal: Negative for abdominal distention, abdominal pain, nausea and vomiting  Endocrine: Negative  Genitourinary: Negative for difficulty urinating and flank pain  Musculoskeletal: Negative  Skin: Negative for rash  Allergic/Immunologic: Positive for environmental allergies  Neurological: Positive for headaches  Psychiatric/Behavioral: Negative for sleep disturbance and suicidal ideas           Objective:  Vitals:    05/26/21 1227   BP: 122/68   BP Location: Left arm   Patient Position: Sitting   Cuff Size: Standard   Pulse: 100   Resp: 16   Temp: 97 9 °F (36 6 °C)   TempSrc: Temporal   SpO2: 96%   Weight: 79 6 kg (175 lb 6 4 oz)   Height: 6' 2" (1 88 m)     Body mass index is 22 52 kg/m²  Physical Exam  Vitals signs and nursing note reviewed  HENT:      Head: Atraumatic  Right Ear: Tympanic membrane is erythematous  Left Ear: Tympanic membrane is erythematous  Nose: Congestion and rhinorrhea present  Mouth/Throat:      Pharynx: Posterior oropharyngeal erythema present  Eyes:      Pupils: Pupils are equal, round, and reactive to light  Neck:      Musculoskeletal: Neck supple  Cardiovascular:      Rate and Rhythm: Normal rate and regular rhythm  Pulmonary:      Effort: Pulmonary effort is normal    Abdominal:      General: Bowel sounds are normal       Palpations: Abdomen is soft  Skin:     General: Skin is dry  Capillary Refill: Capillary refill takes less than 2 seconds  Neurological:      General: No focal deficit present  Mental Status: He is alert and oriented to person, place, and time     Psychiatric:         Mood and Affect: Mood normal          Behavior: Behavior normal

## 2021-05-27 LAB — SARS-COV-2 RNA RESP QL NAA+PROBE: NEGATIVE

## 2021-05-30 NOTE — PATIENT INSTRUCTIONS
Sinusitis   WHAT YOU NEED TO KNOW:   Sinusitis is inflammation or infection of your sinuses  It is most often caused by a virus  Acute sinusitis may last up to 12 weeks  Chronic sinusitis lasts longer than 12 weeks  Recurrent sinusitis means you have 4 or more times in 1 year  DISCHARGE INSTRUCTIONS:   Return to the emergency department if:   · Your eye and eyelid are red, swollen, and painful  · You cannot open your eye  · You have vision changes, such as double vision  · Your eyeball bulges out or you cannot move your eye  · You are more sleepy than normal, or you notice changes in your ability to think, move, or talk  · You have a stiff neck, a fever, or a bad headache  · You have swelling of your forehead or scalp  Contact your healthcare provider if:   · Your symptoms do not improve after 3 days  · Your symptoms do not go away after 10 days  · You have nausea and are vomiting  · Your nose is bleeding  · You have questions or concerns about your condition or care  Medicines: Your symptoms may go away on their own  Your healthcare provider may recommend watchful waiting for up to 10 days before starting antibiotics  You may  need any of the following:  · Acetaminophen  decreases pain and fever  It is available without a doctor's order  Ask how much to take and how often to take it  Follow directions  Read the labels of all other medicines you are using to see if they also contain acetaminophen, or ask your doctor or pharmacist  Acetaminophen can cause liver damage if not taken correctly  Do not use more than 4 grams (4,000 milligrams) total of acetaminophen in one day  · NSAIDs , such as ibuprofen, help decrease swelling, pain, and fever  This medicine is available with or without a doctor's order  NSAIDs can cause stomach bleeding or kidney problems in certain people   If you take blood thinner medicine, always ask your healthcare provider if NSAIDs are safe for you  Always read the medicine label and follow directions  · Nasal steroid sprays  may help decrease inflammation in your nose and sinuses  · Decongestants  help reduce swelling and drain mucus in the nose and sinuses  They may help you breathe easier  · Antihistamines  help dry mucus in the nose and relieve sneezing  · Antibiotics  help treat or prevent a bacterial infection  · Take your medicine as directed  Contact your healthcare provider if you think your medicine is not helping or if you have side effects  Tell him or her if you are allergic to any medicine  Keep a list of the medicines, vitamins, and herbs you take  Include the amounts, and when and why you take them  Bring the list or the pill bottles to follow-up visits  Carry your medicine list with you in case of an emergency  Self-care:   · Rinse your sinuses  Use a sinus rinse device to rinse your nasal passages with a saline (salt water) solution or distilled water  Do not use tap water  This will help thin the mucus in your nose and rinse away pollen and dirt  It will also help reduce swelling so you can breathe normally  Ask your healthcare provider how often to do this  · Breathe in steam   Heat a bowl of water until you see steam  Lean over the bowl and make a tent over your head with a large towel  Breathe deeply for about 20 minutes  Be careful not to get too close to the steam or burn yourself  Do this 3 times a day  You can also breathe deeply when you take a hot shower  · Sleep with your head elevated  Place an extra pillow under your head before you go to sleep to help your sinuses drain  · Drink liquids as directed  Ask your healthcare provider how much liquid to drink each day and which liquids are best for you  Liquids will thin the mucus in your nose and help it drain  Avoid drinks that contain alcohol or caffeine  · Do not smoke, and avoid secondhand smoke    Nicotine and other chemicals in cigarettes and cigars can make your symptoms worse  Ask your healthcare provider for information if you currently smoke and need help to quit  E-cigarettes or smokeless tobacco still contain nicotine  Talk to your healthcare provider before you use these products  Prevent the spread of germs that cause sinusitis:  Wash your hands often with soap and water  Wash your hands after you use the bathroom, change a child's diaper, or sneeze  Wash your hands before you prepare or eat food  Follow up with your healthcare provider as directed: You may be referred to an ear, nose, and throat specialist  Write down your questions so you remember to ask them during your visits  © Copyright [address] Information is for End User's use only and may not be sold, redistributed or otherwise used for commercial purposes  All illustrations and images included in CareNotes® are the copyrighted property of [employer] D [employer] M [employer]  or [employer]   The above information is an  only  It is not intended as medical advice for individual conditions or treatments  Talk to your doctor, nurse or pharmacist before following any medical regimen to see if it is safe and effective for you  Acute Bronchitis   WHAT YOU NEED TO KNOW:   Acute bronchitis is swelling and irritation in your lungs  It is usually caused by a virus and most often happens in the winter  Bronchitis may also be caused by bacteria or by a chemical irritant, such as smoke  DISCHARGE INSTRUCTIONS:   Return to the emergency department if:   · You cough up blood  · Your lips or fingernails turn blue  · You feel like you are not getting enough air when you breathe  Call your doctor if:   · Your symptoms do not go away or get worse, even after treatment  · Your cough does not get better within 4 weeks  · You have questions or concerns about your condition or care  Medicines:   You may  need any of the following:  · Cough suppressants decrease your urge to cough  · Decongestants  help loosen mucus in your lungs and make it easier to cough up  This can help you breathe easier  · Inhalers  may be given  Your healthcare provider may give you one or more inhalers to help you breathe easier and cough less  An inhaler gives your medicine to open your airways  Ask your healthcare provider to show you how to use your inhaler correctly  · Acetaminophen  decreases pain and fever  It is available without a doctor's order  Ask how much to take and how often to take it  Follow directions  Read the labels of all other medicines you are using to see if they also contain acetaminophen, or ask your doctor or pharmacist  Acetaminophen can cause liver damage if not taken correctly  Do not use more than 4 grams (4,000 milligrams) total of acetaminophen in one day  · NSAIDs  help decrease swelling and pain or fever  This medicine is available with or without a doctor's order  NSAIDs can cause stomach bleeding or kidney problems in certain people  If you take blood thinner medicine, always ask your healthcare provider if NSAIDs are safe for you  Always read the medicine label and follow directions  · Take your medicine as directed  Contact your healthcare provider if you think your medicine is not helping or if you have side effects  Tell him of her if you are allergic to any medicine  Keep a list of the medicines, vitamins, and herbs you take  Include the amounts, and when and why you take them  Bring the list or the pill bottles to follow-up visits  Carry your medicine list with you in case of an emergency  Self-care:   · Drink liquids as directed  You may need to drink more liquids than usual to stay hydrated  Ask how much liquid to drink each day and which liquids are best for you  · Use a cool mist humidifier  to increase air moisture in your home  This may make it easier for you to breathe and help decrease your cough       · Get more rest   Rest helps your body to heal  Slowly start to do more each day  Rest when you feel it is needed  · Avoid irritants in the air  Avoid chemicals, fumes, and dust  Wear a face mask if you must work around dust or fumes  Stay inside on days when air pollution levels are high  If you have allergies, stay inside when pollen counts are high  Do not use aerosol products, such as spray-on deodorant, bug spray, and hair spray  · Do not smoke or be around others who are smoking  Nicotine and other chemicals in cigarettes and cigars can cause lung damage  Ask your healthcare provider for information if you currently smoke and need help to quit  E-cigarettes or smokeless tobacco still contain nicotine  Talk to your healthcare provider before you use these products  Prevent acute bronchitis:       · Get the vaccinations you need  Ask your healthcare provider if you should get the flu or pneumonia vaccines  · Prevent the spread of germs  You can decrease your risk for acute bronchitis and other illnesses by doing the following:     ? Wash your hands often with soap and water  Carry germ-killing hand lotion or gel with you  You can use the lotion or gel to clean your hands when soap and water are not available  ? Do not touch your eyes, nose, or mouth unless you have washed your hands first     ? Always cover your mouth when you cough to prevent the spread of germs  It is best to cough into a tissue or your shirt sleeve instead of into your hand  Ask those around you to cover their mouths when they cough  ? Try to avoid people who have a cold or the flu  If you are sick, stay away from others as much as possible  Follow up with your doctor as directed:  Write down questions you have so you will remember to ask them during your follow-up visits  © Copyright [facility] [address] Information is for End User's use only and may not be sold, redistributed or otherwise used for commercial purposes  All illustrations and images included in CareNotes® are the copyrighted property of A D A M , Inc  or Alexandra Zimmerman  The above information is an  only  It is not intended as medical advice for individual conditions or treatments  Talk to your doctor, nurse or pharmacist before following any medical regimen to see if it is safe and effective for you

## 2021-10-27 ENCOUNTER — TELEMEDICINE (OUTPATIENT)
Dept: FAMILY MEDICINE CLINIC | Facility: CLINIC | Age: 31
End: 2021-10-27
Payer: MEDICARE

## 2021-10-27 DIAGNOSIS — G44.52 NEW DAILY PERSISTENT HEADACHE: ICD-10-CM

## 2021-10-27 DIAGNOSIS — M79.601 RIGHT ARM PAIN: ICD-10-CM

## 2021-10-27 DIAGNOSIS — R05.9 COUGH: Primary | ICD-10-CM

## 2021-10-27 DIAGNOSIS — R09.81 NASAL CONGESTION: ICD-10-CM

## 2021-10-27 PROCEDURE — 99213 OFFICE O/P EST LOW 20 MIN: CPT | Performed by: NURSE PRACTITIONER

## 2021-10-27 RX ORDER — FLUTICASONE PROPIONATE 50 MCG
1 SPRAY, SUSPENSION (ML) NASAL DAILY
COMMUNITY

## 2021-10-27 RX ORDER — AMOXICILLIN 875 MG/1
875 TABLET, COATED ORAL 2 TIMES DAILY
Qty: 14 TABLET | Refills: 0 | Status: SHIPPED | OUTPATIENT
Start: 2021-10-27 | End: 2021-11-03

## 2021-10-27 RX ORDER — BENZONATATE 200 MG/1
200 CAPSULE ORAL 3 TIMES DAILY PRN
Qty: 20 CAPSULE | Refills: 0 | Status: SHIPPED | OUTPATIENT
Start: 2021-10-27 | End: 2021-11-06

## 2021-10-30 PROCEDURE — U0005 INFEC AGEN DETEC AMPLI PROBE: HCPCS | Performed by: NURSE PRACTITIONER

## 2021-10-30 PROCEDURE — U0003 INFECTIOUS AGENT DETECTION BY NUCLEIC ACID (DNA OR RNA); SEVERE ACUTE RESPIRATORY SYNDROME CORONAVIRUS 2 (SARS-COV-2) (CORONAVIRUS DISEASE [COVID-19]), AMPLIFIED PROBE TECHNIQUE, MAKING USE OF HIGH THROUGHPUT TECHNOLOGIES AS DESCRIBED BY CMS-2020-01-R: HCPCS | Performed by: NURSE PRACTITIONER

## 2021-12-14 ENCOUNTER — TELEMEDICINE (OUTPATIENT)
Dept: FAMILY MEDICINE CLINIC | Facility: CLINIC | Age: 31
End: 2021-12-14
Payer: MEDICARE

## 2021-12-14 DIAGNOSIS — R09.81 SINUS CONGESTION: Primary | ICD-10-CM

## 2021-12-14 DIAGNOSIS — R05.9 COUGH: ICD-10-CM

## 2021-12-14 PROCEDURE — 99442 PR PHYS/QHP TELEPHONE EVALUATION 11-20 MIN: CPT | Performed by: NURSE PRACTITIONER

## 2021-12-14 PROCEDURE — 0241U HB NFCT DS VIR RESP RNA 4 TRGT: CPT | Performed by: NURSE PRACTITIONER

## 2021-12-14 RX ORDER — BENZONATATE 100 MG/1
100 CAPSULE ORAL 3 TIMES DAILY PRN
Qty: 20 CAPSULE | Refills: 0 | Status: SHIPPED | OUTPATIENT
Start: 2021-12-14 | End: 2021-12-19

## 2021-12-14 RX ORDER — AMOXICILLIN AND CLAVULANATE POTASSIUM 875; 125 MG/1; MG/1
1 TABLET, FILM COATED ORAL EVERY 12 HOURS SCHEDULED
Qty: 14 TABLET | Refills: 0 | Status: SHIPPED | OUTPATIENT
Start: 2021-12-14 | End: 2021-12-21

## 2022-01-18 ENCOUNTER — TELEMEDICINE (OUTPATIENT)
Dept: FAMILY MEDICINE CLINIC | Facility: CLINIC | Age: 32
End: 2022-01-18
Payer: MEDICARE

## 2022-01-18 DIAGNOSIS — F17.200 SMOKING: ICD-10-CM

## 2022-01-18 DIAGNOSIS — R05.9 COUGH: ICD-10-CM

## 2022-01-18 DIAGNOSIS — J01.20 ACUTE ETHMOIDAL SINUSITIS, RECURRENCE NOT SPECIFIED: ICD-10-CM

## 2022-01-18 DIAGNOSIS — R09.81 NASAL CONGESTION: Primary | ICD-10-CM

## 2022-01-18 PROBLEM — IMO0001 SMOKING: Status: ACTIVE | Noted: 2022-01-18

## 2022-01-18 PROCEDURE — 99215 OFFICE O/P EST HI 40 MIN: CPT | Performed by: NURSE PRACTITIONER

## 2022-01-18 RX ORDER — AMOXICILLIN 875 MG/1
875 TABLET, COATED ORAL 2 TIMES DAILY
Qty: 20 TABLET | Refills: 0 | Status: SHIPPED | OUTPATIENT
Start: 2022-01-18 | End: 2022-01-28

## 2022-01-18 RX ORDER — OXYMETAZOLINE HYDROCHLORIDE 0.05 G/100ML
2 SPRAY NASAL 2 TIMES DAILY
Qty: 30 ML | Refills: 0 | Status: SHIPPED | OUTPATIENT
Start: 2022-01-18

## 2022-01-18 NOTE — ASSESSMENT & PLAN NOTE
Patient reports intermittent cough he believes due to smoking he has this cough in the morning  He has been counseled on tobacco cessation at this time

## 2022-01-18 NOTE — PROGRESS NOTES
Virtual Regular Visit    Verification of patient location:    Patient is located in the following state in which I hold an active license [place]      Assessment/Plan:    Problem List Items Addressed This Visit        Respiratory    Acute ethmoidal sinusitis     Patient reports that he has had sinus infections in the past   Currently concerned over smoking causing his sinus infections  Patient counseled on stopping or abstaining from smoking to reduce chances of sinus infections  Patient also counseled on use of a Neti pot, saline nasal spray and if necessary Afrin nasal spray for severe nasal congestion  He has also been instructed during changes season he may use allergy medications such as Claritin as well as Flonase for routine maintenance  Relevant Medications    amoxicillin (AMOXIL) 875 mg tablet       Other    Smoking     Tobacco cessation counseling provided  Nasal congestion - Primary     Patient ordered for Afrin nasal spray for nasal congestion  Relevant Medications    oxymetazoline (AFRIN) 0 05 % nasal spray    Cough     Patient reports intermittent cough he believes due to smoking he has this cough in the morning  He has been counseled on tobacco cessation at this time  Reason for visit is   Chief Complaint   Patient presents with    Virtual Regular Visit        Encounter provider Saul Rodriguez [address] [place] [address]    Provider located at [address] [place] [id number] [phone]      Recent Visits  No visits were found meeting these conditions  Showing recent visits within past 7 days and meeting all other requirements  Today's Visits  Date Type Provider Dept   01/18/22 Telemedicine BLANCA Cooper 112 today's visits and meeting all other requirements  Future Appointments  No visits were found meeting these conditions    Showing future appointments within next 150 days and meeting all other requirements       The patient was identified by name and date of birth  Nadiya Trevino was informed that this is a telemedicine visit and that the visit is being conducted through [address] [facility] and patient was informed that this is a secure, HIPAA-compliant platform  He agrees to proceed     My office door was closed  No one else was in the room  He acknowledged consent and understanding of privacy and security of the video platform  The patient has agreed to participate and understands they can discontinue the visit at any time  Patient is aware this is a billable service  Subjective  Nadiya Trevino is a 28 y o  male virtual video visit for reports of sore throat, nasal congestion, postnasal drip and cough  Patient is a 28year old male that reports sore throat, stuffy nose, cough with yellow phlegm and congestion  Patient indicates he had some white spots on his throat as well, and wakes up in the morning with cough which he believes is secondary to smoking  He has been instructed to abstain from smoking as well as to start antibiotic therapy of amoxicillin 875 mg p o  B i d   Also he may use Claritin 10 mg p o  Daily and Flonase routinely during change of season  Patient instructed if he continues to have symptoms to follow-up in 1 week  Past Medical History:   Diagnosis Date    Asperger's syndrome     GERD (gastroesophageal reflux disease)     Rectal prolapse     Rectal prolapse 5/9/2018       Past Surgical History:   Procedure Laterality Date    HERNIA REPAIR      DE COLONOSCOPY FLX DX W/COLLJ SPEC WHEN PFRMD N/A 5/7/2018    Procedure: COLONOSCOPY;  Surgeon: Bibi Og MD;  Location: [facility];   Service: Colorectal    DE LAP, SURG PROCTOPEXY W/SIG RESECT N/A 5/8/2018    Procedure: LAPAROSCOPIC HAND-ASSIST REPAIR OF RECTAL PROLAPSE BY SACRAL  PROTOPEXY;  Surgeon: Bibi Og MD;  [facility];  Service: Colorectal   Manolo Gilbert ND SIGMOIDOSCOPY FLX DX W/COLLJ SPEC BR/WA IF PFRMD N/A 6/19/2018    Procedure: Zenaida Habermann;  Surgeon: Gal Coleman MD;  Location: [facility]; Service: Colorectal    SURGERY SCROTAL / TESTICULAR         Current Outpatient Medications   Medication Sig Dispense Refill    amoxicillin (AMOXIL) 875 mg tablet Take 1 tablet (875 mg total) by mouth 2 (two) times a day for 10 days 20 tablet 0    diphenhydrAMINE HCl (ELIAN-SELTZER PLUS ALLERGY PO) Take by mouth      fluticasone (FLONASE) 50 mcg/act nasal spray 1 spray into each nostril daily (Patient not taking: Reported on 12/14/2021 )      oxymetazoline (AFRIN) 0 05 % nasal spray 2 sprays by Each Nare route 2 (two) times a day 30 mL 0     No current facility-administered medications for this visit  No Known Allergies    Review of Systems   Constitutional: Negative for activity change, appetite change, chills, fatigue, fever and unexpected weight change  HENT: Positive for congestion, postnasal drip, rhinorrhea, sinus pressure and sore throat  Negative for ear discharge, ear pain, nosebleeds, sinus pain, sneezing and voice change  Eyes: Negative for pain, redness and visual disturbance  Respiratory: Positive for cough  Negative for chest tightness, shortness of breath and wheezing  Cardiovascular: Negative for chest pain and palpitations  Gastrointestinal: Negative for abdominal distention, abdominal pain, constipation, diarrhea, nausea and vomiting  Endocrine: Negative  Genitourinary: Negative for difficulty urinating, dysuria, flank pain, frequency, hematuria and urgency  Musculoskeletal: Negative for arthralgias and myalgias  Skin: Negative  Allergic/Immunologic: Negative  Neurological: Negative  Hematological: Negative  Psychiatric/Behavioral: Negative  Video Exam    There were no vitals filed for this visit  Physical Exam  Vitals and nursing note reviewed     Constitutional:       Appearance: Normal appearance  He is well-developed  HENT:      Right Ear: External ear normal       Left Ear: External ear normal       Nose: Congestion and rhinorrhea present  Mouth/Throat:      Mouth: Mucous membranes are moist       Pharynx: No oropharyngeal exudate or posterior oropharyngeal erythema  Eyes:      Conjunctiva/sclera: Conjunctivae normal    Pulmonary:      Effort: Pulmonary effort is normal    Musculoskeletal:         General: Normal range of motion  Cervical back: Normal range of motion  Skin:     General: Skin is dry  Neurological:      Mental Status: He is alert and oriented to person, place, and time  Psychiatric:         Mood and Affect: Mood normal          Behavior: Behavior normal           I spent 25 minutes directly with the patient during this visit    [address] [place] [address] verbally agrees to participate in [facility]  Pt is aware that [facility] could be limited without vital signs or the ability to perform a full hands-on physical David Ellis understands he or the provider may request at any time to terminate the video visit and request the patient to seek care or treatment in person

## 2022-01-18 NOTE — ASSESSMENT & PLAN NOTE
Patient reports that he has had sinus infections in the past   Currently concerned over smoking causing his sinus infections  Patient counseled on stopping or abstaining from smoking to reduce chances of sinus infections  Patient also counseled on use of a Neti pot, saline nasal spray and if necessary Afrin nasal spray for severe nasal congestion  He has also been instructed during changes season he may use allergy medications such as Claritin as well as Flonase for routine maintenance

## 2022-01-18 NOTE — PATIENT INSTRUCTIONS
Cigarette Smoking and Your Health   WHAT YOU NEED TO KNOW:   What are the risks to my health if I smoke tobacco?  Nicotine and other chemicals found in tobacco and e-cigarettes can damage every cell in your body  Even if you are a light smoker, you have an increased risk for cancer, heart disease, and lung disease  If you are pregnant or have diabetes, smoking increases your risk for complications  Nicotine can affect an adolescent's developing brain  This can lead to trouble thinking, learning, or paying attention  What are the benefits to my health if I stop smoking? · You decrease respiratory symptoms such as coughing, wheezing, and shortness of breath  · You reduce your risk for cancers of the lung, mouth, throat, kidney, bladder, pancreas, stomach, and cervix  If you already have cancer, you increase the benefits of chemotherapy  You also reduce your risk for cancer returning or a second cancer from developing  · You reduce your risk for heart disease, blood clots, heart attack, and stroke  · You reduce your risk for lung infections, and diseases such as pneumonia, asthma, chronic bronchitis, and emphysema  · Your circulation improves  More oxygen can be delivered to your body  If you have diabetes, you lower your risk for complications, such as kidney, artery, and eye diseases  You also lower your risk for nerve damage  Nerve damage can lead to amputations, poor vision, and blindness  · You improve your body's ability to heal and to fight infections  · An adolescent can help his or her brain and body develop in a healthy way  Talk to your adolescent about all the health risks of nicotine  If you can, start talking about nicotine when your child is younger than 12 years  This may make it easier for him or her not to start using nicotine as a teenager or adult  Explain to him or her that it is best never to start  It can be hard to try to quit later      What are the health benefits to others if I stop smoking? Tobacco is harmful to nonsmokers who breathe in your secondhand smoke  The following are ways the health of others around you may improve when you stop smoking:  · You lower the risks for lung cancer and heart disease in nonsmoking adults  · If you are pregnant, you lower the risk for miscarriage, early delivery, low birth weight, and stillbirth  You also lower your baby's risk for SIDS, obesity, developmental delay, and neurobehavioral problems, such as ADHD  · If you have children, you lower their risk for ear infections, colds, pneumonia, bronchitis, and asthma  Where can I find support and more information? · [employer]  [address] [facility]  [address]  Phone: [facility] [address]  Phone: [phone]  Web Address: [employer]    · Smokefree  gov  Phone: 1- 263 - 751-1787  Web Address: www smokefree  gov    CARE AGREEMENT:   You have the right to help plan your care  Learn about your health condition and how it may be treated  Discuss treatment options with your healthcare providers to decide what care you want to receive  You always have the right to refuse treatment  The above information is an  only  It is not intended as medical advice for individual conditions or treatments  Talk to your doctor, nurse or pharmacist before following any medical regimen to see if it is safe and effective for you  © Copyright [employer] 2021 Information is for End User's use only and may not be sold, redistributed or otherwise used for commercial purposes  All illustrations and images included in CareNotes® are the copyrighted property of [employer]  or [facility]  Sinusitis   WHAT YOU NEED TO KNOW:   What is sinusitis? Sinusitis is inflammation or infection of your sinuses  Sinusitis is most often caused by a virus  Acute sinusitis may last up to 12 weeks  Chronic sinusitis lasts longer than 12 weeks   Recurrent sinusitis means you have 4 or more infections in 1 year  What increases my risk for sinusitis? · Medical conditions, such as an upper respiratory infection, allergies, asthma, or cystic fibrosis    · Dental infections or procedures, such as gum infections, tooth decay, or a root canal    · Smoking or exposure to secondhand smoke    · Abnormal sinus structure, such as nasal growths, swollen tonsils, or a deviated septum    · A weak immune system, from diseases such as diabetes or HIV    What are the signs and symptoms of sinusitis? · Fever    · Pain, pressure, redness, or swelling around the forehead, cheeks, or eyes    · Thick yellow or green discharge from your nose    · Tenderness when you touch your face over your sinuses    · Dry cough that happens mostly at night or when you lie down    · Headache and face pain that is worse when you lean forward    · Tooth pain, or pain when you chew    How is sinusitis diagnosed? Your healthcare provider will examine you and ask about your symptoms  He or she may check inside your nose using a nasal speculum  You may need any of the following tests:  · A sample of mucus from your nose  may show what germ is causing your infection  · A CT or MRI of your head  may show the mucous lining of your sinuses  You may be given contrast liquid to help your sinuses show up better in the pictures  Tell your healthcare provider if you have ever had an allergic reaction to contrast liquid  Do not enter the MRI room with anything metal  Metal can cause serious injury  Tell your healthcare provider if you have any metal in or on your body  How is sinusitis treated? Your symptoms may go away on their own  Your healthcare provider may recommend watchful waiting for up to 10 days before starting antibiotics  You may need any of the following:  · Acetaminophen  decreases pain and fever  It is available without a doctor's order  Ask how much to take and how often to take it  Follow directions  Read the labels of all other medicines you are using to see if they also contain acetaminophen, or ask your doctor or pharmacist  Acetaminophen can cause liver damage if not taken correctly  Do not use more than 4 grams (4,000 milligrams) total of acetaminophen in one day  · NSAIDs , such as ibuprofen, help decrease swelling, pain, and fever  This medicine is available with or without a doctor's order  NSAIDs can cause stomach bleeding or kidney problems in certain people  If you take blood thinner medicine, always ask your healthcare provider if NSAIDs are safe for you  Always read the medicine label and follow directions  · Nasal steroid sprays  may help decrease inflammation in your nose and sinuses  · Decongestants  help reduce swelling and drain mucus in the nose and sinuses  They may help you breathe easier  · Antihistamines  help dry mucus in the nose and relieve sneezing  · Antibiotics  help treat or prevent a bacterial infection  How can I manage my symptoms? · Rinse your sinuses as directed  Use a sinus rinse device to rinse your nasal passages with a saline (salt water) solution or distilled water  Do not use tap water  This will help thin the mucus in your nose and rinse away pollen and dirt  It will also help reduce swelling so you can breathe normally  · Use a humidifier  to increase air moisture in your home  This may make it easier for you to breathe and help decrease your cough  · Sleep with your head elevated  Place an extra pillow under your head before you go to sleep to help your sinuses drain  · Drink liquids as directed  Ask your healthcare provider how much liquid to drink each day and which liquids are best for you  Liquids will thin the mucus in your nose and help it drain  Avoid drinks that contain alcohol or caffeine  · Do not smoke, and avoid secondhand smoke  Nicotine and other chemicals in cigarettes and cigars can make your symptoms worse   Ask your healthcare provider for information if you currently smoke and need help to quit  E-cigarettes or smokeless tobacco still contain nicotine  Talk to your healthcare provider before you use these products  How can I help prevent the spread of germs? · Wash your hands often with soap and water  Wash your hands after you use the bathroom, change a child's diaper, or sneeze  Wash your hands before you prepare or eat food  · Stay away from people who are sick  Some germs spread easily and quickly through contact  When should I seek immediate care? · You have trouble breathing or wheezing that is getting worse  · You have a stiff neck, a fever, or a bad headache  · You cannot open your eye  · Your eyeball bulges out or you cannot move your eye  · You are more sleepy than normal, or you notice changes in your ability to think, move, or talk  · You have swelling of your forehead or scalp  When should I call my doctor? · You have vision changes, such as double vision  · Your eye and eyelid are red, swollen, and painful  · Your symptoms do not improve or go away after 10 days  · You have nausea and are vomiting  · Your nose is bleeding  · You have questions or concerns about your condition or care  CARE AGREEMENT:   You have the right to help plan your care  Learn about your health condition and how it may be treated  Discuss treatment options with your healthcare providers to decide what care you want to receive  You always have the right to refuse treatment  The above information is an  only  It is not intended as medical advice for individual conditions or treatments  Talk to your doctor, nurse or pharmacist before following any medical regimen to see if it is safe and effective for you  © Copyright [employer] 2021 Information is for End User's use only and may not be sold, redistributed or otherwise used for commercial purposes   All illustrations and images included in CareNotes® are the copyrighted property of [employer]  or Alexandra Zimmerman

## 2022-04-15 ENCOUNTER — OFFICE VISIT (OUTPATIENT)
Dept: FAMILY MEDICINE CLINIC | Facility: CLINIC | Age: 32
End: 2022-04-15
Payer: MEDICARE

## 2022-04-15 ENCOUNTER — APPOINTMENT (OUTPATIENT)
Dept: LAB | Facility: HOSPITAL | Age: 32
End: 2022-04-15
Payer: MEDICARE

## 2022-04-15 VITALS
WEIGHT: 169.4 LBS | TEMPERATURE: 97.3 F | BODY MASS INDEX: 21.74 KG/M2 | OXYGEN SATURATION: 98 % | DIASTOLIC BLOOD PRESSURE: 70 MMHG | HEART RATE: 71 BPM | SYSTOLIC BLOOD PRESSURE: 118 MMHG | RESPIRATION RATE: 18 BRPM | HEIGHT: 74 IN

## 2022-04-15 DIAGNOSIS — R10.84 GENERALIZED ABDOMINAL PAIN: Primary | ICD-10-CM

## 2022-04-15 DIAGNOSIS — E78.2 MIXED HYPERLIPIDEMIA: ICD-10-CM

## 2022-04-15 DIAGNOSIS — R63.4 WEIGHT LOSS: ICD-10-CM

## 2022-04-15 DIAGNOSIS — R10.84 GENERALIZED ABDOMINAL PAIN: ICD-10-CM

## 2022-04-15 DIAGNOSIS — R21 RASH: ICD-10-CM

## 2022-04-15 LAB
ALBUMIN SERPL BCP-MCNC: 4.4 G/DL (ref 3.5–5)
ALP SERPL-CCNC: 82 U/L (ref 34–104)
ALT SERPL W P-5'-P-CCNC: 9 U/L (ref 7–52)
AMYLASE SERPL-CCNC: 41 IU/L (ref 29–103)
ANION GAP SERPL CALCULATED.3IONS-SCNC: 6 MMOL/L (ref 4–13)
AST SERPL W P-5'-P-CCNC: 15 U/L (ref 13–39)
BACTERIA UR QL AUTO: ABNORMAL /HPF
BASOPHILS # BLD AUTO: 0.06 THOUSANDS/ΜL (ref 0–0.1)
BASOPHILS NFR BLD AUTO: 1 % (ref 0–1)
BILIRUB SERPL-MCNC: 0.6 MG/DL (ref 0.2–1)
BILIRUB UR QL STRIP: NEGATIVE
BUN SERPL-MCNC: 13 MG/DL (ref 5–25)
CALCIUM SERPL-MCNC: 9.7 MG/DL (ref 8.4–10.2)
CHLORIDE SERPL-SCNC: 103 MMOL/L (ref 96–108)
CHOLEST SERPL-MCNC: 153 MG/DL
CLARITY UR: CLEAR
CO2 SERPL-SCNC: 31 MMOL/L (ref 21–32)
COLOR UR: YELLOW
CREAT SERPL-MCNC: 0.84 MG/DL (ref 0.6–1.3)
EOSINOPHIL # BLD AUTO: 0.08 THOUSAND/ΜL (ref 0–0.61)
EOSINOPHIL NFR BLD AUTO: 1 % (ref 0–6)
ERYTHROCYTE [DISTWIDTH] IN BLOOD BY AUTOMATED COUNT: 12.6 % (ref 11.6–15.1)
GFR SERPL CREATININE-BSD FRML MDRD: 115 ML/MIN/1.73SQ M
GLUCOSE P FAST SERPL-MCNC: 91 MG/DL (ref 65–99)
GLUCOSE UR STRIP-MCNC: NEGATIVE MG/DL
HCT VFR BLD AUTO: 44.9 % (ref 36.5–49.3)
HDLC SERPL-MCNC: 44 MG/DL
HGB BLD-MCNC: 14.9 G/DL (ref 12–17)
HGB UR QL STRIP.AUTO: ABNORMAL
IMM GRANULOCYTES # BLD AUTO: 0.01 THOUSAND/UL (ref 0–0.2)
IMM GRANULOCYTES NFR BLD AUTO: 0 % (ref 0–2)
KETONES UR STRIP-MCNC: NEGATIVE MG/DL
LDLC SERPL CALC-MCNC: 88 MG/DL (ref 0–100)
LEUKOCYTE ESTERASE UR QL STRIP: NEGATIVE
LIPASE SERPL-CCNC: 24 U/L (ref 11–82)
LYMPHOCYTES # BLD AUTO: 2.28 THOUSANDS/ΜL (ref 0.6–4.47)
LYMPHOCYTES NFR BLD AUTO: 36 % (ref 14–44)
MCH RBC QN AUTO: 29.1 PG (ref 26.8–34.3)
MCHC RBC AUTO-ENTMCNC: 33.2 G/DL (ref 31.4–37.4)
MCV RBC AUTO: 88 FL (ref 82–98)
MONOCYTES # BLD AUTO: 0.6 THOUSAND/ΜL (ref 0.17–1.22)
MONOCYTES NFR BLD AUTO: 10 % (ref 4–12)
MUCOUS THREADS UR QL AUTO: ABNORMAL
NEUTROPHILS # BLD AUTO: 3.25 THOUSANDS/ΜL (ref 1.85–7.62)
NEUTS SEG NFR BLD AUTO: 52 % (ref 43–75)
NITRITE UR QL STRIP: NEGATIVE
NON-SQ EPI CELLS URNS QL MICRO: ABNORMAL /HPF
NONHDLC SERPL-MCNC: 109 MG/DL
NRBC BLD AUTO-RTO: 0 /100 WBCS
PH UR STRIP.AUTO: 6 [PH]
PLATELET # BLD AUTO: 190 THOUSANDS/UL (ref 149–390)
PMV BLD AUTO: 10.2 FL (ref 8.9–12.7)
POTASSIUM SERPL-SCNC: 3.7 MMOL/L (ref 3.5–5.3)
PROT SERPL-MCNC: 7.1 G/DL (ref 6.4–8.4)
PROT UR STRIP-MCNC: NEGATIVE MG/DL
RBC # BLD AUTO: 5.12 MILLION/UL (ref 3.88–5.62)
RBC #/AREA URNS AUTO: ABNORMAL /HPF
SODIUM SERPL-SCNC: 140 MMOL/L (ref 135–147)
SP GR UR STRIP.AUTO: >=1.03 (ref 1–1.03)
TRIGL SERPL-MCNC: 104 MG/DL
TSH SERPL DL<=0.05 MIU/L-ACNC: 1.31 UIU/ML (ref 0.45–4.5)
UROBILINOGEN UR QL STRIP.AUTO: 0.2 E.U./DL
WBC # BLD AUTO: 6.28 THOUSAND/UL (ref 4.31–10.16)
WBC #/AREA URNS AUTO: ABNORMAL /HPF

## 2022-04-15 PROCEDURE — 81001 URINALYSIS AUTO W/SCOPE: CPT | Performed by: NURSE PRACTITIONER

## 2022-04-15 PROCEDURE — 80074 ACUTE HEPATITIS PANEL: CPT

## 2022-04-15 PROCEDURE — 36415 COLL VENOUS BLD VENIPUNCTURE: CPT

## 2022-04-15 PROCEDURE — 80053 COMPREHEN METABOLIC PANEL: CPT

## 2022-04-15 PROCEDURE — 85025 COMPLETE CBC W/AUTO DIFF WBC: CPT

## 2022-04-15 PROCEDURE — 82150 ASSAY OF AMYLASE: CPT

## 2022-04-15 PROCEDURE — 99214 OFFICE O/P EST MOD 30 MIN: CPT | Performed by: NURSE PRACTITIONER

## 2022-04-15 PROCEDURE — 83690 ASSAY OF LIPASE: CPT

## 2022-04-15 PROCEDURE — 84443 ASSAY THYROID STIM HORMONE: CPT

## 2022-04-15 PROCEDURE — 80061 LIPID PANEL: CPT

## 2022-04-15 NOTE — Clinical Note
Lorraine oFss please call him tomorrow   Please call pt and let him know I have ordered the medications for the rash - I am aware that he is on an antibiotic have him finish it and start the acyclovir after done     Also I am still awaiting his US of abdomen and Needs to follow up with GI

## 2022-04-15 NOTE — PROGRESS NOTES
Depression Screening and Follow-up Plan: Patient was screened for depression during today's encounter  They screened negative with a PHQ-2 score of 0  Tobacco Cessation Counseling: Tobacco cessation counseling was provided  The patient is sincerely urged to quit consumption of tobacco  He is not ready to quit tobacco  Medication options and side effects of medication discussed  Assessment/Plan:    Pt presents in office for follow up abdominal issues, generalized abdominal pain and diarrhea , lack of appetite and weight loss  Also has a rash to right abdomen / right flank - posterior rash along the dermatone   I have ordered labs and once I receive pts liver function I will order medications for shingles   Also discussed GI work up at this point and follow up with Gi specialist   I have ordered labs and abdominal US   Discussed increasing po intake and protein and hydrate well   ADDENDUM   Labs have been  Reviewed and ordered Acyclovir   Rest of the labs look ok      Problem List Items Addressed This Visit     None      Visit Diagnoses     Generalized abdominal pain    -  Primary    Relevant Orders    Comprehensive metabolic panel    CBC and differential    TSH, 3rd generation    Lipid panel    UA (URINE) with reflex to Scope    Ambulatory Referral to Gastroenterology    Lipase    Amylase    Hepatitis panel, acute    US abdomen complete    Rash        shingles   awaiting liver functions to order meds   he is getting labs done today       Relevant Orders    Comprehensive metabolic panel    CBC and differential    TSH, 3rd generation    Lipid panel    UA (URINE) with reflex to Scope    Hepatitis panel, acute    US abdomen complete    Weight loss        Relevant Orders    Comprehensive metabolic panel    CBC and differential    TSH, 3rd generation    Lipid panel    UA (URINE) with reflex to Scope    Ambulatory Referral to Gastroenterology    Lipase    Amylase    Hepatitis panel, acute    US abdomen complete Mixed hyperlipidemia        Relevant Orders    Comprehensive metabolic panel    CBC and differential    TSH, 3rd generation    Lipid panel    UA (URINE) with reflex to Scope    Hepatitis panel, acute    US abdomen complete            Subjective:      Patient ID: Nathalie Ramírez is a 28 y o  male  HPI    The following portions of the patient's history were reviewed and updated as appropriate:   Past Medical History:  He has a past medical history of Asperger's syndrome, GERD (gastroesophageal reflux disease), Rectal prolapse, and Rectal prolapse (5/9/2018)  ,  _______________________________________________________________________  Medical Problems:  does not have any pertinent problems on file ,  _______________________________________________________________________  Past Surgical History:   has a past surgical history that includes Hernia repair; Surgery scrotal / testicular; pr colonoscopy flx dx w/collj spec when pfrmd (N/A, 5/7/2018); pr lap, surg proctopexy w/sig resect (N/A, 5/8/2018); and pr sigmoidoscopy flx dx w/collj spec br/wa if pfrmd (N/A, 6/19/2018)  ,  _______________________________________________________________________  Family History:  Family history is unknown by patient  ,  _______________________________________________________________________  Social History:   reports that he has been smoking  He has been smoking about 1 00 pack per day  He has never used smokeless tobacco  He reports that he does not drink alcohol and does not use drugs  ,  _______________________________________________________________________  Allergies:  has No Known Allergies     _______________________________________________________________________  Current Outpatient Medications   Medication Sig Dispense Refill    diphenhydrAMINE HCl (ELIAN-SELTZER PLUS ALLERGY PO) Take by mouth      fluticasone (FLONASE) 50 mcg/act nasal spray 1 spray into each nostril daily (Patient not taking: Reported on 12/14/2021 )      oxymetazoline (AFRIN) 0 05 % nasal spray 2 sprays by Each Nare route 2 (two) times a day 30 mL 0     No current facility-administered medications for this visit      _______________________________________________________________________  Review of Systems   Constitutional: Positive for fatigue  Negative for chills and fever  HENT: Positive for congestion  Negative for sinus pain and sore throat  Eyes: Negative  Respiratory: Negative for cough and shortness of breath  Cardiovascular: Negative for chest pain and palpitations  Gastrointestinal: Positive for abdominal distention, abdominal pain (abdominal issues and cramps ), diarrhea and nausea  Weight loss and change in bowel regiment    Genitourinary: Positive for flank pain  Negative for difficulty urinating  Musculoskeletal: Positive for arthralgias and myalgias  Has right flank pain and posterior rash noted    Skin: Positive for rash (right posterio along the lower rib  dermatone )  Allergic/Immunologic: Positive for environmental allergies  Psychiatric/Behavioral: Positive for sleep disturbance  Negative for suicidal ideas  The patient is nervous/anxious  Objective:  Vitals:    04/15/22 0834   BP: 118/70   BP Location: Left arm   Patient Position: Sitting   Cuff Size: Standard   Pulse: 71   Resp: 18   Temp: (!) 97 3 °F (36 3 °C)   TempSrc: Temporal   SpO2: 98%   Weight: 76 8 kg (169 lb 6 4 oz)   Height: 6' 2" (1 88 m)     Body mass index is 21 75 kg/m²  Physical Exam  Vitals reviewed  Constitutional:       Appearance: He is well-developed  HENT:      Head: Atraumatic  Comments: Congested   Abdominal:      General: Bowel sounds are normal    Skin:     Findings: Erythema and rash present  Comments: To right posterior flank pain and rib rash / shingles    Neurological:      Mental Status: He is alert and oriented to person, place, and time     Psychiatric:         Mood and Affect: Mood normal  Behavior: Behavior normal          Comprehensive metabolic panel  Order: [id number]   Status: Final result     Visible to patient: Yes (not seen)     Next appt: None     Dx: Mixed hyperlipidemia; Rash; Generaliz        1 Result Note     1 Patient Communication    Component Ref Range & Units 4/15/22  9:53 AM 8/10/20  7:44 AM 6/19/18  1:55 AM 5/9/18  5:11 AM 4/23/18 11:00 AM 6/22/17  6:54 PM 6/15/17 11:17 AM   Sodium 135 - 147 mmol/L 140  144 R  140 R  136 R  143 R  142 R  144 R    Potassium 3 5 - 5 3 mmol/L 3 7  4 6  3 6  3 9  4 1  3 6  4 2    Chloride 96 - 108 mmol/L 103  106 R  106 R  103 R  104 R  105 R  105 R    CO2 21 - 32 mmol/L 31  29 R  29  25  30  30  31    ANION GAP 4 - 13 mmol/L 6   5  8  9  7  8    BUN 5 - 25 mg/dL 13  15 R  19  7  11  15  13    Creatinine 0 60 - 1 30 mg/dL 0 84  0 90 R  0 91 CM  0 70 CM  0 88 CM  0 89 CM  0 88 CM    Comment: Standardized to IDMS reference method   Glucose, Fasting 65 - 99 mg/dL 91     92 CM      Comment: Specimen collection should occur prior to Sulfasalazine administration due to the potential for falsely depressed results  Specimen collection should occur prior to Sulfapyridine administration due to the potential for falsely elevated results  Calcium 8 4 - 10 2 mg/dL 9 7  9 7 R  8 9 R  8 7 R  9 1 R  9 1 R  9 2 R    AST 13 - 39 U/L 15  14 R  14 R, CM   19 R, CM  16 R  15 R    Comment: Specimen collection should occur prior to Sulfasalazine administration due to the potential for falsely depressed results  ALT 7 - 52 U/L 9  12 R  21 R, CM   20 R, CM  27 R  25 R    Comment: Specimen collection should occur prior to Sulfasalazine administration due to the potential for falsely depressed results      Alkaline Phosphatase 34 - 104 U/L 82  89 R  102 R   106 R  100 R  95 R    Total Protein 6 4 - 8 4 g/dL 7 1  6 9 R  7 0 R   7 0 R  7 2 R  7 4 R    Albumin 3 5 - 5 0 g/dL 4 4  4 4 R  3 7   4 0  3 9  4 1    Total Bilirubin 0 20 - 1 00 mg/dL 0 60  0 4 R  0 25   0 50  0 60  0 30 eGFR ml/min/1 73sq m 115   114  128  117  >60 0  >60 0         and differential  Order: [id number]   Status: Final result     Visible to patient: Yes (not seen)     Next appt: None     Dx: Mixed hyperlipidemia; Rash; Generaliz        2 Result Notes     1 Patient Communication    Component Ref Range & Units 4/15/22  9:53 AM 6/19/18  1:55 AM 5/11/18  5:24 PM 5/11/18  5:24 PM 5/9/18  5:11 AM 4/23/18 11:00 AM 6/22/17  6:54 PM   WBC 4 31 - 10 16 Thousand/uL 6 28  7 69   9 18  9 61  5 09  7 81    RBC 3 88 - 5 62 Million/uL 5 12  4 85   5 12  4 90  5 27  5 28    Hemoglobin 12 0 - 17 0 g/dL 14 9  13 9   15 3  14 3  14 7  15 0    Hematocrit 36 5 - 49 3 % 44 9  41 8   44 5  42 1  44 8  44 3    MCV 82 - 98 fL 88  86   87  86  85  84    MCH 26 8 - 34 3 pg 29 1  28 7   29 9  29 2  27 9  28 4    MCHC 31 4 - 37 4 g/dL 33 2  33 3   34 4  34 0  32 8  33 9    RDW 11 6 - 15 1 % 12 6  12 8   12 9  12 9  12 8  13 0    MPV 8 9 - 12 7 fL 10 2  10 1   11 6  10 6  10 0  10 9    Platelets 653 - 141 Thousands/uL 190  201   245  167  215  210    nRBC /100 WBCs 0  0   2 CM  0      Neutrophils Relative 43 - 75 % 52  59    83 High   53  59    Immat GRANS % 0 - 2 % 0  0         Lymphocytes Relative 14 - 44 % 36  31    8 Low   38  32    Monocytes Relative 4 - 12 % 10  8    9  7  6    Eosinophils Relative 0 - 6 % 1  1  0   0  2  2    Basophils Relative 0 - 1 % 1  1  0   0  0  1    Neutrophils Absolute 1 85 - 7 62 Thousands/µL 3 25  4 49    7 91 High   2 73  4 65    Immature Grans Absolute 0 00 - 0 20 Thousand/uL 0 01  0 01         Lymphocytes Absolute 0 60 - 4 47 Thousands/µL 2 28  2 41    0 80  1 92  2 53    Monocytes Absolute 0 17 - 1 22 Thousand/µL 0 60  0 64    0 85  0 33  0 47    Eosinophils Absolute 0 00 - 0 61 Thousand/µL 0 08  0 09  0 00 R   0 00  0 09  0 12    Basophils Absolute 0 00 - 0 10 Thousands/µL 0 06  0 05  0 00 R   0 01  0 02  0 04    Segmented %    73 R        Lymphocytes %    21 R        Monocytes %    6 R        Absolute Neutrophils    6 70 R        Lymphocytes Absolute    1 93 R        Monocytes Absolute    0 55 R        Total Counted    100        Platelet Estimate    Adequate R             TSH, 3rd generation  Order: [id number]   Status: Final result     Visible to patient: Yes (not seen)     Next appt: None     Dx: Mixed hyperlipidemia; Rash; Generaliz        1 Result Note     1 Patient Communication     Ref Range & Units 4/15/22  9:53 AM   TSH 3RD GENERATON 0 450 - 4 500 uIU/mL 1 310    Comment: Adult TSH (3rd generation) reference range follows the recommended guidelines of the [employer], January, 2020  Narrative          Lipid panel  Order: [id number]   Status: Final result     Visible to patient: Yes (not seen)     Next appt: None     Dx: Mixed hyperlipidemia; Rash; Generaliz        1 Result Note     1 Patient Communication     Ref Range & Units 4/15/22  9:53 AM   Cholesterol See Comment mg/dL 153    Comment: Cholesterol:         Pediatric <18 Years         Desirable          <170 mg/dL       Borderline High    170-199 mg/dL       High               >=200 mg/dL         Adult >=18 Years             Desirable         <200 mg/dL       Borderline High   200-239 mg/dL       High             >239 mg/dL    Triglycerides See Comment mg/dL 104    Comment: Triglyceride:      0-9Y            <75mg/dL      10Y-17Y         <90 mg/dL        >=18Y      Normal          <150 mg/dL      Borderline High 150-199 mg/dL      High            200-499 mg/dL        Very High       >499 mg/dL     Specimen collection should occur prior to N-Acetylcysteine or Metamizole administration due to the potential for falsely depressed results     HDL, Direct >=40 mg/dL 44    LDL Calculated 0 - 100 mg/dL 88    Comment: LDL Cholesterol:     Optimal           <100 mg/dl     Near Optimal      100-129 mg/dl     Above Optimal       Borderline High 130-159 mg/dl       High            160-189 mg/dl       Very High       >189 mg/dl     This screening LDL is a calculated result  It does not have the accuracy of the Direct Measured LDL in the monitoring of patients with hyperlipidemia and/or statin therapy  Direct Measure LDL (PPZ035) must be ordered separately in these patients  Non-HDL-Chol (CHOL-HDL) mg/dl 109               Specimen Collected: 04/15/22  9:53 AM Last Resulted: 04/15/22 11:13 AM           Lipase  Order: [id number]   Status: Final result     Visible to patient: Yes (not seen)     Next appt: None     Dx: Generalized abdominal pain; Weight loss     1 Result Note     1 Patient Communication    Component Ref Range & Units 4/15/22  9:53 AM 6/22/17  6:54 PM 6/15/17 11:17 AM   Lipase 11 - 82 u/L 24  108 R  124 R         Amylase  Order: [id number]   Status: Final result     Visible to patient: Yes (not seen)     Next appt: None     Dx: Generalized abdominal pain; Weight loss     1 Result Note     1 Patient Communication     Ref Range & Units 4/15/22  9:53 AM   Amylase 29 - 103 IU/L 41               Specimen Collected: 04/15/22  9:53 AM Last Resulted: 04/15/22 11:13 AM              Hepatitis panel, acute  Order: [id number]   Status: Final result     Visible to patient: Yes (not seen)     Next appt: None     Dx: Mixed hyperlipidemia; Rash; Generaliz        0 Result Notes     1 HM Topic    Component Ref Range & Units 4/15/22  9:53 AM 8/10/20  7:44 AM   Hepatitis B Surface Ag Non-reactive, NonReactive - Confirmed Non-reactive     Hep A IgM Non-reactive, Equivocal-Suggest Recollect Non-reactive     Comment: SEE WRITTEN REPORT FROM [facility]   Hepatitis C Ab Non-reactive Non-reactive  NON-REACTIVE R    Hep B C IgM Non-reactive Non-reactive          Urine Microscopic  Order: [id number] - Reflex for Order [id number]   Status: Final result     Visible to patient: Yes (seen)     Next appt: None     Dx: Generalized abdominal pain; Mixed hyp        0 Result Notes    Component Ref Range & Units 4/15/22  9:53 AM 7/31/20 11:20 AM   RBC, UA None Seen, 0-1, 1-2, 2-4, 0-5 /hpf None Seen  None Seen R    WBC, UA None Seen, 0-1, 1-2, 0-5, 2-4 /hpf 0-1  Innumerable Abnormal  R    Epithelial Cells None Seen, Occasional /hpf None Seen  None Seen    Bacteria, UA None Seen, Occasional /hpf Occasional  None Seen    MUCUS THREADS None Seen Moderate Abnormal                 Specimen Collected: 04/15/22  9:53 AM Last Resulted: 04/15/22 11:20 AM

## 2022-04-18 ENCOUNTER — OFFICE VISIT (OUTPATIENT)
Dept: FAMILY MEDICINE CLINIC | Facility: CLINIC | Age: 32
End: 2022-04-18
Payer: MEDICARE

## 2022-04-18 VITALS
HEART RATE: 84 BPM | BODY MASS INDEX: 21.38 KG/M2 | TEMPERATURE: 97.6 F | RESPIRATION RATE: 18 BRPM | SYSTOLIC BLOOD PRESSURE: 118 MMHG | WEIGHT: 166.6 LBS | HEIGHT: 74 IN | DIASTOLIC BLOOD PRESSURE: 72 MMHG | OXYGEN SATURATION: 98 %

## 2022-04-18 DIAGNOSIS — J02.9 SORE THROAT: Primary | ICD-10-CM

## 2022-04-18 DIAGNOSIS — R51.9 GENERALIZED HEADACHES: ICD-10-CM

## 2022-04-18 DIAGNOSIS — R05.9 COUGH: ICD-10-CM

## 2022-04-18 DIAGNOSIS — A64 STD (SEXUALLY TRANSMITTED DISEASE): ICD-10-CM

## 2022-04-18 DIAGNOSIS — Z72.0 DECLINED SMOKING CESSATION: ICD-10-CM

## 2022-04-18 LAB
HAV IGM SER QL: NORMAL
HBV CORE IGM SER QL: NORMAL
HBV SURFACE AG SER QL: NORMAL
HCV AB SER QL: NORMAL

## 2022-04-18 PROCEDURE — 87636 SARSCOV2 & INF A&B AMP PRB: CPT | Performed by: FAMILY MEDICINE

## 2022-04-18 PROCEDURE — 99214 OFFICE O/P EST MOD 30 MIN: CPT | Performed by: FAMILY MEDICINE

## 2022-04-18 PROCEDURE — 87880 STREP A ASSAY W/OPTIC: CPT | Performed by: FAMILY MEDICINE

## 2022-04-18 RX ORDER — VARENICLINE TARTRATE 25 MG
KIT ORAL
Qty: 53 TABLET | Refills: 0 | Status: SHIPPED | OUTPATIENT
Start: 2022-04-18 | End: 2022-07-19

## 2022-04-18 RX ORDER — AZITHROMYCIN 250 MG/1
TABLET, FILM COATED ORAL
Qty: 6 TABLET | Refills: 0 | Status: SHIPPED | OUTPATIENT
Start: 2022-04-18 | End: 2022-04-23

## 2022-04-18 NOTE — PROGRESS NOTES
Assessment/Plan:32 y o male, saw Conor Barclay, last Fri for pain over the liver and rash on back -- ? Shingles and wt loss, but "that's my fault", he states  1o problem is "white stuff in throat" and prone to "sinus infections"  1  Sore throat  -     POCT rapid strepA  -     Covid/Flu- Office Collect  -     azithromycin (Zithromax) 250 mg tablet; Take 2 tablets (500 mg total) by mouth daily for 1 day, THEN 1 tablet (250 mg total) daily for 4 days  2  Cough  -     Covid/Flu- Office Collect    3  Generalized headaches  -     Covid/Flu- Office Collect    4  Declined smoking cessation  -     varenicline (CHANTIX LA) 0 5 MG X 11 & 1 MG X 42 tablet; Take one 0 5 mg tablet by mouth once daily for 3 days, then one 0 5 mg tablet by mouth twice daily for 4 days, then one 1 mg tablet by mouth twice daily  5  STD (sexually transmitted disease)  -     Rapid Plasma Reagin (RPR), Quantitation; Future  -     HIV 1/2 Antigen/Antibody (4th Generation) w Reflex [facility]; Future  -     Chlamydia/N  gonorrhoeae RNA, TMA; Future; Expected date: 04/25/2022        Subjective:      Patient ID: Steve Hannah is a 28 y o  male  HPI    The following portions of the patient's history were reviewed and updated as appropriate: He  has a past medical history of Asperger's syndrome, GERD (gastroesophageal reflux disease), Rectal prolapse, and Rectal prolapse (5/9/2018)    He   Patient Active Problem List    Diagnosis Date Noted    Smoking 01/18/2022    Nasal congestion 01/18/2022    Acute ethmoidal sinusitis 01/18/2022    Cough 01/18/2022    Autism spectrum 04/06/2021    Environmental and seasonal allergies 11/10/2020    Personal history of colonic polyps 03/19/2019    Rectal bleed 06/19/2018    Rectal prolapse 05/09/2018     He  has a past surgical history that includes Hernia repair; Surgery scrotal / testicular; pr colonoscopy flx dx w/collj spec when pfrmd (N/A, 5/7/2018); pr lap, surg proctopexy w/sig resect (N/A, 5/8/2018); and pr sigmoidoscopy flx dx w/collj spec br/wa if pfrmd (N/A, 6/19/2018)  His Family history is unknown by patient  He  reports that he has been smoking  He has been smoking about 1 00 pack per day  He has never used smokeless tobacco  He reports that he does not drink alcohol and does not use drugs  Current Outpatient Medications   Medication Sig Dispense Refill    azithromycin (Zithromax) 250 mg tablet Take 2 tablets (500 mg total) by mouth daily for 1 day, THEN 1 tablet (250 mg total) daily for 4 days  6 tablet 0    diphenhydrAMINE HCl (ELIAN-SELTZER PLUS ALLERGY PO) Take by mouth      fluticasone (FLONASE) 50 mcg/act nasal spray 1 spray into each nostril daily (Patient not taking: Reported on 12/14/2021 )      oxymetazoline (AFRIN) 0 05 % nasal spray 2 sprays by Each Nare route 2 (two) times a day 30 mL 0    varenicline (CHANTIX LA) 0 5 MG X 11 & 1 MG X 42 tablet Take one 0 5 mg tablet by mouth once daily for 3 days, then one 0 5 mg tablet by mouth twice daily for 4 days, then one 1 mg tablet by mouth twice daily  53 tablet 0     No current facility-administered medications for this visit  Current Outpatient Medications on File Prior to Visit   Medication Sig    diphenhydrAMINE HCl (ELIAN-SELTZER PLUS ALLERGY PO) Take by mouth    fluticasone (FLONASE) 50 mcg/act nasal spray 1 spray into each nostril daily (Patient not taking: Reported on 12/14/2021 )    oxymetazoline (AFRIN) 0 05 % nasal spray 2 sprays by Each Nare route 2 (two) times a day     No current facility-administered medications on file prior to visit  He has No Known Allergies       Review of Systems   Constitutional: Negative for activity change, appetite change, chills, diaphoresis, fatigue, fever and unexpected weight change  HENT: Negative for congestion, dental problem, drooling, ear discharge, ear pain, facial swelling, mouth sores, nosebleeds, postnasal drip, rhinorrhea, trouble swallowing and voice change  Eyes: Negative for photophobia, pain, discharge, redness, itching and visual disturbance  Respiratory: Negative for apnea, cough, choking, chest tightness and shortness of breath  Smoking about 10 cigaretts a day - claims he wants to stop  Thinking aobut Chantix - but I told him I'm not in favor of that due to mental health issues  He agrees  Will return to the patch and decrease smoking by one cigarette/d a wk   Cardiovascular: Negative for chest pain and leg swelling  Gastrointestinal: Negative for abdominal distention, abdominal pain, constipation, diarrhea and nausea  Endocrine: Negative for polydipsia, polyphagia and polyuria  Genitourinary: Negative for decreased urine volume, difficulty urinating, dysuria, enuresis and hematuria  Father  of renal cancer - age 46   Musculoskeletal: Positive for arthralgias  Negative for back pain, gait problem and joint swelling  Skin: Negative for color change, pallor, rash and wound  Allergic/Immunologic: Negative for immunocompromised state  Neurological: Negative for dizziness, seizures, syncope, facial asymmetry, speech difficulty, light-headedness and headaches  Hematological: Negative for adenopathy  Psychiatric/Behavioral: Negative for agitation, behavioral problems, confusion and decreased concentration  Objective:      /72   Pulse 84   Temp 97 6 °F (36 4 °C)   Resp 18   Ht 6' 2" (1 88 m)   Wt 75 6 kg (166 lb 9 6 oz)   SpO2 98%   BMI 21 39 kg/m²          Physical Exam  Vitals and nursing note reviewed  Constitutional:       General: He is not in acute distress  Appearance: Normal appearance  He is well-developed and normal weight  He is not ill-appearing, toxic-appearing or diaphoretic  HENT:      Head: Normocephalic and atraumatic  Right Ear: Tympanic membrane, ear canal and external ear normal  No drainage, swelling or tenderness        Left Ear: Tympanic membrane, ear canal and external ear normal  No drainage, swelling or tenderness  Nose: Nose normal  No congestion or rhinorrhea  Mouth/Throat:      Mouth: Mucous membranes are moist  No oral lesions  Pharynx: Pharyngeal swelling (very enlarted tonsils ) and oropharyngeal exudate present  No uvula swelling  Tonsils: Tonsillar exudate present  No tonsillar abscesses  0 on the right  0 on the left  Eyes:      Extraocular Movements: Extraocular movements intact  Conjunctiva/sclera: Conjunctivae normal       Pupils: Pupils are equal, round, and reactive to light  Comments: Slight redness of the throat noted, with white exudate  Strep testing negative  Cardiovascular:      Rate and Rhythm: Normal rate and regular rhythm  Pulses: Normal pulses  Heart sounds: Normal heart sounds  No murmur heard  No friction rub  Pulmonary:      Effort: Pulmonary effort is normal       Breath sounds: Normal breath sounds  Abdominal:      General: Bowel sounds are normal       Palpations: Abdomen is soft  Musculoskeletal:         General: Normal range of motion  Cervical back: Normal range of motion  Skin:     General: Skin is warm  Capillary Refill: Capillary refill takes less than 2 seconds  Neurological:      General: No focal deficit present  Mental Status: He is alert and oriented to person, place, and time  Psychiatric:         Mood and Affect: Mood normal          Behavior: Behavior normal          Pt seen from 4:55 to 5:35 pm and all the above discussed and reviewed  Chantix will be tried and pt insists he will     This time was spent reviewing previous records, reviewing previous laboratory and other tests, taking history from patient, examination of patient, discussion of prognosis and treatment, ordering laboratory tests, ordering medications, and completion of the medical record

## 2022-04-18 NOTE — PATIENT INSTRUCTIONS
Chlamydia   WHAT YOU NEED TO KNOW:   What is chlamydia? Chlamydia is a sexually transmitted infection (STI) caused by bacteria  Chlamydia is spread during oral, vaginal, or anal sex  The infection most often affects the urethra, rectum, or throat  The urethra is the tube that carries urine from your bladder to the outside of your body  Anyone with multiple sex partners is at higher risk for chlamydia  Your risk is also increased if you have another STI, such as gonorrhea  What are the signs and symptoms of chlamydia? · Vaginal redness or itching    · Thick, yellow-green discharge coming from your penis, rectum, or vagina    · Feeling like you need to urinate more often than usual    · Pain or burning when you urinate    · Pain when you have sex    · Pain in your lower abdomen, penis, or vagina  · Sore throat or swollen lymph nodes in your neck    · Fever    How is chlamydia diagnosed? Your healthcare provider will ask you questions about your health and sexual history  He or she will need to know when your symptoms started  Tell your provider about any STIs you or your partner may have  You may need any of the following:  · Blood or urine tests  may show the bacteria that causes chlamydia  · A sample of discharge  may help providers know what treatment is best for you  How is chlamydia treated? Antibiotics help treat the infection caused by bacteria  Both you and your sex partner need treatment to prevent chlamydia from spreading  How can I prevent the spread of chlamydia and other STIs? Ask your healthcare provider for more information about the following safe sex practices:  · Use a male or female condom during sex  This includes oral, genital, or anal sex  Use a new condom each time  Condoms help prevent pregnancy and STIs  Use latex condoms, if possible  Lambskin (also called sheepskin or natural membrane) condoms do not protect against STIs   A polyurethane condom can be used if you or your partner is allergic to latex  Condoms should be used with a second form of birth control to help prevent pregnancy and STIs  Do not use male and female condoms together  Ask for more information about the correct way to use condoms  · Limit your number of sex partners  This will help lower your risk for chlamydia and other STIs  · Get tested for STIs regularly  if you are sexually active  You should get tested 1 time a year, or after new sexual partners  Get tested if you have sex without a condom  This includes oral, genital, or anal sex  · Do not have sex with someone who has an STI  This includes oral, vaginal, and anal sex  · Do not have sex while you or your partner are being treated  Ask when it is safe to have sex  · Ask about medicines to lower your risk for some STIs:      ? Vaccines  can help protect you from hepatitis A, hepatitis B, and the human papillomavirus (HPV)  The HPV vaccine is usually given at 11 years, but it may be given through 26 years to both females and males  Your provider can give you more information on vaccines to prevent STIs  ? Pre-exposure prophylaxis (PrEP)  may be given if you are at high risk for HIV  PrEP is taken every day to prevent the virus from fully infecting the body  · If you are a woman:      ? Do not douche  Douching upsets the normal balance of bacteria found in your vagina  It does not prevent or clear up vaginal infections  ? Tell your healthcare provider if you are pregnant  Gonorrhea can be passed to an infant during birth  When should I call my doctor? · You have a fever  · You have nausea or you cannot stop vomiting  · You have severe abdominal pain  · Your signs or symptoms last longer than 1 week or get worse during treatment  · Your signs or symptoms return after treatment  · You have pain during sex  · You have questions or concerns about your condition or care      CARE AGREEMENT:   You have the right to help plan your care  Learn about your health condition and how it may be treated  Discuss treatment options with your healthcare providers to decide what care you want to receive  You always have the right to refuse treatment  The above information is an  only  It is not intended as medical advice for individual conditions or treatments  Talk to your doctor, nurse or pharmacist before following any medical regimen to see if it is safe and effective for you  © Copyright [facility] 2022 Information is for End User's use only and may not be sold, redistributed or otherwise used for commercial purposes   All illustrations and images included in CareNotes® are the copyrighted property of [employer]  or [address] [place] [address] [facility]

## 2022-04-19 LAB
FLUAV RNA RESP QL NAA+PROBE: NEGATIVE
FLUBV RNA RESP QL NAA+PROBE: NEGATIVE
S PYO AG THROAT QL: NEGATIVE
SARS-COV-2 RNA RESP QL NAA+PROBE: NEGATIVE

## 2022-04-19 RX ORDER — ACYCLOVIR 800 MG/1
800 TABLET ORAL 2 TIMES DAILY
Qty: 20 TABLET | Refills: 0 | Status: SHIPPED | OUTPATIENT
Start: 2022-04-19 | End: 2022-04-29

## 2022-04-20 NOTE — PATIENT INSTRUCTIONS
Shingles   WHAT YOU NEED TO KNOW:   Shingles is a painful rash  Shingles is caused by the same virus that causes chickenpox (varicella-zoster)  After you get chickenpox, the virus stays in your body for several years without causing any symptoms  Shingles occurs when the virus becomes active again  The active virus travels along a nerve to your skin and causes a rash  DISCHARGE INSTRUCTIONS:   Call your local emergency number (911 in the [address] [facility]) if:   · You have trouble moving your arms, legs, or face  · You become confused, or have difficulty speaking  · You have a seizure  Return to the emergency department if:   · You have weakness in an arm or leg  · You have dizziness, a severe headache, or hearing or vision loss  · You have painful, red, warm skin around the blisters, or the blisters drain pus  · Your neck is stiff or you have trouble moving it  Call your doctor if:   · You feel weak or have a headache  · You have a cough, chills, or a fever  · You have abdominal pain or nausea, or you are vomiting  · Your rash becomes more itchy or painful  · Your rash spreads to other parts of your body  · Your pain worsens and does not go away even after you take medicine  · You have questions or concerns about your condition or care  Medicines: You may need any of the following:  · Antiviral medicine  helps decrease symptoms and healing time  They may also decrease your risk of developing nerve pain  You will need to start taking them within 3 days of the start of symptoms to prevent nerve pain  · Prescription pain medicine  may be given  Ask your healthcare provider how to take this medicine safely  Some prescription pain medicines contain acetaminophen  Do not take other medicines that contain acetaminophen without talking to your healthcare provider  Too much acetaminophen may cause liver damage  Prescription pain medicine may cause constipation   Ask your healthcare provider how to prevent or treat constipation  · Acetaminophen  decreases pain and fever  It is available without a doctor's order  Ask how much to take and how often to take it  Follow directions  Read the labels of all other medicines you are using to see if they also contain acetaminophen, or ask your doctor or pharmacist  Acetaminophen can cause liver damage if not taken correctly  Do not use more than 4 grams (4,000 milligrams) total of acetaminophen in one day  · NSAIDs , such as ibuprofen, help decrease swelling, pain, and fever  This medicine is available with or without a doctor's order  NSAIDs can cause stomach bleeding or kidney problems in certain people  If you take blood thinner medicine, always ask if NSAIDs are safe for you  Always read the medicine label and follow directions  Do not give these medicines to children under 10months of age without direction from your child's healthcare provider  · Topical anesthetics  are used to numb the skin and decrease pain  They can be a cream, gel, spray, or patch  · Anticonvulsants  decrease nerve pain and may help you sleep at night  · Antidepressants  may be used to decrease nerve pain  · Take your medicine as directed  Contact your healthcare provider if you think your medicine is not helping or if you have side effects  Tell him of her if you are allergic to any medicine  Keep a list of the medicines, vitamins, and herbs you take  Include the amounts, and when and why you take them  Bring the list or the pill bottles to follow-up visits  Carry your medicine list with you in case of an emergency  Self-care:  Keep your rash clean and dry  Cover your rash with a bandage or clothing  Do not use bandages that stick to your skin  The sticky part may irritate your skin and make your rash last longer  Prevent the spread of germs:       · Wash your hands often  Wash your hands several times each day   Wash after you use the bathroom, change a child's diaper, and before you prepare or eat food  Use soap and water every time  Rub your soapy hands together, lacing your fingers  Wash the front and back of your hands, and in between your fingers  Use the fingers of one hand to scrub under the fingernails of the other hand  Wash for at least 20 seconds  Rinse with warm, running water for several seconds  Then dry your hands with a clean towel or paper towel  Use hand  that contains alcohol if soap and water are not available  Do not touch your eyes, nose, or mouth without washing your hands first          · Cover a sneeze or cough  Use a tissue that covers your mouth and nose  Throw the tissue away in a trash can right away  Use the bend of your arm if a tissue is not available  Wash your hands well with soap and water or use a hand   · Stay away from others while you are sick  Avoid crowds as much as possible  · Ask about vaccines you may need  Talk to your healthcare provider about your vaccine history  He or she will tell you which vaccines you need, and when to get them  Prevent shingles or another shingles outbreak:  A vaccine may be given to help prevent shingles  You can get the vaccine even if you already had shingles  The vaccine can help prevent a future outbreak  If you do get shingles again, the vaccine can keep it from becoming severe  The vaccine comes in 2 forms  Your healthcare provider will tell you which form is right for you  The decision is based on your age and any medical conditions you have  A 2-dose vaccine is usually given to adults 48 years or older  A 1-dose vaccine may be given to adults 61 years or older  Follow up with your doctor as directed:  Write down your questions so you remember to ask them during your visits    For more information:   · Centers for Disease Control and Prevention  [address] , [address]  Phone: 9- 488 - 6917335  Phone: 3- 055 - 3404893  Web Address: [employer] com br    © Copyright "[employer] 2022 Information is for End User's use only and may not be sold, redistributed or otherwise used for commercial purposes  All illustrations and images included in CareNotes® are the copyrighted property of [employer]  or Alexandra Zimmerman  The above information is an  only  It is not intended as medical advice for individual conditions or treatments  Talk to your doctor, nurse or pharmacist before following any medical regimen to see if it is safe and effective for you

## 2022-04-29 ENCOUNTER — TELEPHONE (OUTPATIENT)
Dept: FAMILY MEDICINE CLINIC | Facility: CLINIC | Age: 32
End: 2022-04-29

## 2022-04-29 NOTE — TELEPHONE ENCOUNTER
[id number] [facility] called patient and told them they don't have Chantix  They told Patient they would call Dr Uzma Amaral, but patient has not heard back  Please call patient

## 2022-07-18 DIAGNOSIS — Z72.0 DECLINED SMOKING CESSATION: ICD-10-CM

## 2022-07-19 RX ORDER — VARENICLINE TARTRATE 1 MG/1
TABLET, FILM COATED ORAL
Qty: 53 TABLET | Refills: 0 | Status: SHIPPED | OUTPATIENT
Start: 2022-07-19

## 2022-07-20 ENCOUNTER — TELEPHONE (OUTPATIENT)
Dept: FAMILY MEDICINE CLINIC | Facility: CLINIC | Age: 32
End: 2022-07-20

## 2022-07-20 NOTE — TELEPHONE ENCOUNTER
Patient's mother called and patient only has a half a pill left of chantix    Mom is worried if he just stops it and he does want a refill please

## 2022-07-20 NOTE — TELEPHONE ENCOUNTER
varenicline (CHANTIX) 1 mg tablet        Sig: TAKE ONE-HALF TABLET BY MOUTH ONCE DAILY FOR 3 DAYS, THEN TAKE ONE-HALF TABLET TWICE DAILY FOR 4 DAYS, THEN TAKE ONE TABLET TWICE DAILY        Sent to pharmacy as: Varenicline Tartrate 1 MG Oral Tablet (CHANTIX)        Class: Normal        E-Prescribing Status: Receipt confirmed by pharmacy (7/19/2022 10:48 PM EDT)            Medication was sent and confirmed by pharmacy yesterday

## 2022-07-26 ENCOUNTER — OFFICE VISIT (OUTPATIENT)
Dept: FAMILY MEDICINE CLINIC | Facility: CLINIC | Age: 32
End: 2022-07-26
Payer: MEDICARE

## 2022-07-26 VITALS
HEART RATE: 57 BPM | SYSTOLIC BLOOD PRESSURE: 124 MMHG | RESPIRATION RATE: 18 BRPM | TEMPERATURE: 97.2 F | BODY MASS INDEX: 21.12 KG/M2 | DIASTOLIC BLOOD PRESSURE: 80 MMHG | OXYGEN SATURATION: 98 % | HEIGHT: 74 IN | WEIGHT: 164.6 LBS

## 2022-07-26 DIAGNOSIS — R63.4 EXCESSIVE BODY WEIGHT LOSS: ICD-10-CM

## 2022-07-26 DIAGNOSIS — Z72.0 DECLINED SMOKING CESSATION: ICD-10-CM

## 2022-07-26 DIAGNOSIS — Z72.0 TOBACCO ABUSE DISORDER: Primary | ICD-10-CM

## 2022-07-26 PROCEDURE — 99214 OFFICE O/P EST MOD 30 MIN: CPT | Performed by: FAMILY MEDICINE

## 2022-07-26 NOTE — PROGRESS NOTES
Assessment/Plan:  28 y o male, in NAD, who started the Chantix on June 19 and has been doing very well with it  No smoking now and cannot stand the smell  This is the last refill  Life stresses are better and pt is coping better  Wonders if he should have the excess skin removed from abd and arms ==> refer to plastic surg  Thinking of moving out of Mom's house  1  Tobacco abuse disorder  Comments:  Long discussion with respect to how he is doing on Chantix--he is on 2nd refill and doing very nicely  Much encouragement given    2  Declined smoking cessation    3  Excessive body weight loss  Comments: With resultant excess skin upon the abdomen arms and legs  Patient would like to have that removed needs body sculpturing? Refer to plastic          Subjective:      Patient ID: Luciana Johnson is a 28 y o  male  HPI    The following portions of the patient's history were reviewed and updated as appropriate: He  has a past medical history of Asperger's syndrome, GERD (gastroesophageal reflux disease), Rectal prolapse, and Rectal prolapse (5/9/2018)  He   Patient Active Problem List    Diagnosis Date Noted    Smoking 01/18/2022    Nasal congestion 01/18/2022    Acute ethmoidal sinusitis 01/18/2022    Cough 01/18/2022    Autism spectrum 04/06/2021    Environmental and seasonal allergies 11/10/2020    Personal history of colonic polyps 03/19/2019    Rectal bleed 06/19/2018    Rectal prolapse 05/09/2018     He  has a past surgical history that includes Hernia repair; Surgery scrotal / testicular; pr colonoscopy flx dx w/collj spec when pfrmd (N/A, 5/7/2018); pr lap, surg proctopexy w/sig resect (N/A, 5/8/2018); and pr sigmoidoscopy flx dx w/collj spec br/wa if pfrmd (N/A, 6/19/2018)  His Family history is unknown by patient  He  reports that he has been smoking  He has been smoking about 1 00 pack per day   He has never used smokeless tobacco  He reports that he does not drink alcohol and does not use drugs  Current Outpatient Medications   Medication Sig Dispense Refill    diphenhydrAMINE HCl (ELIAN-SELTZER PLUS ALLERGY PO) Take by mouth      varenicline (CHANTIX) 1 mg tablet TAKE ONE-HALF TABLET BY MOUTH ONCE DAILY FOR 3 DAYS, THEN TAKE ONE-HALF TABLET TWICE DAILY FOR 4 DAYS, THEN TAKE ONE TABLET TWICE DAILY 53 tablet 0    acyclovir (Zovirax) 800 mg tablet Take 1 tablet (800 mg total) by mouth 2 (two) times a day for 10 days (Patient not taking: Reported on 7/26/2022) 20 tablet 0    fluticasone (FLONASE) 50 mcg/act nasal spray 1 spray into each nostril daily (Patient not taking: No sig reported)      oxymetazoline (AFRIN) 0 05 % nasal spray 2 sprays by Each Nare route 2 (two) times a day (Patient not taking: Reported on 7/26/2022) 30 mL 0     No current facility-administered medications for this visit  Current Outpatient Medications on File Prior to Visit   Medication Sig    diphenhydrAMINE HCl (ELIAN-SELTZER PLUS ALLERGY PO) Take by mouth    varenicline (CHANTIX) 1 mg tablet TAKE ONE-HALF TABLET BY MOUTH ONCE DAILY FOR 3 DAYS, THEN TAKE ONE-HALF TABLET TWICE DAILY FOR 4 DAYS, THEN TAKE ONE TABLET TWICE DAILY    acyclovir (Zovirax) 800 mg tablet Take 1 tablet (800 mg total) by mouth 2 (two) times a day for 10 days (Patient not taking: Reported on 7/26/2022)    fluticasone (FLONASE) 50 mcg/act nasal spray 1 spray into each nostril daily (Patient not taking: No sig reported)    oxymetazoline (AFRIN) 0 05 % nasal spray 2 sprays by Each Nare route 2 (two) times a day (Patient not taking: Reported on 7/26/2022)     No current facility-administered medications on file prior to visit  He has No Known Allergies       Review of Systems   Constitutional: Negative for activity change, appetite change, chills, diaphoresis, fatigue, fever and unexpected weight change     HENT: Negative for congestion, dental problem, drooling, ear discharge, ear pain, facial swelling, mouth sores, nosebleeds, postnasal drip, rhinorrhea, trouble swallowing and voice change  Eyes: Negative for photophobia, pain, discharge, redness, itching and visual disturbance  Respiratory: Negative for apnea, cough, choking, chest tightness and shortness of breath  Smoking about 10 cigaretts a day - claims he wants to stop  Thinking aobut Chantix - but I told him I'm not in favor of that due to mental health issues  He agrees  Will return to the patch and decrease smoking by one cigarette/d a wk   Cardiovascular: Negative for chest pain and leg swelling  Gastrointestinal: Negative for abdominal distention, abdominal pain, constipation, diarrhea and nausea  Endocrine: Negative for polydipsia, polyphagia and polyuria  Genitourinary: Negative for decreased urine volume, difficulty urinating, dysuria, enuresis and hematuria  Father  of renal cancer - age 46   Musculoskeletal: Positive for arthralgias  Negative for back pain, gait problem and joint swelling  Skin: Negative for color change, pallor, rash and wound  Allergic/Immunologic: Negative for immunocompromised state  Neurological: Negative for dizziness, seizures, syncope, facial asymmetry, speech difficulty, light-headedness and headaches  Hematological: Negative for adenopathy  Psychiatric/Behavioral: Negative for agitation, behavioral problems, confusion and decreased concentration  Objective:      /80 (BP Location: Left arm, Patient Position: Sitting, Cuff Size: Standard)   Pulse 57   Temp (!) 97 2 °F (36 2 °C) (Temporal)   Resp 18   Ht 6' 2" (1 88 m)   Wt 74 7 kg (164 lb 9 6 oz)   SpO2 98%   BMI 21 13 kg/m²          Physical Exam  Vitals and nursing note reviewed  Constitutional:       General: He is not in acute distress  Appearance: Normal appearance  He is well-developed and normal weight  He is not ill-appearing, toxic-appearing or diaphoretic  HENT:      Head: Normocephalic and atraumatic  Right Ear: Tympanic membrane, ear canal and external ear normal  No drainage, swelling or tenderness  Left Ear: Tympanic membrane, ear canal and external ear normal  No drainage, swelling or tenderness  Nose: Nose normal  No congestion or rhinorrhea  Mouth/Throat:      Mouth: Mucous membranes are moist  No oral lesions  Pharynx: Pharyngeal swelling (very enlarted tonsils ) and oropharyngeal exudate present  No uvula swelling  Tonsils: Tonsillar exudate present  No tonsillar abscesses  0 on the right  0 on the left  Eyes:      Extraocular Movements: Extraocular movements intact  Conjunctiva/sclera: Conjunctivae normal       Pupils: Pupils are equal, round, and reactive to light  Comments: Slight redness of the throat noted, with white exudate  Strep testing negative  Cardiovascular:      Rate and Rhythm: Normal rate and regular rhythm  Pulses: Normal pulses  Heart sounds: Normal heart sounds  No murmur heard  No friction rub  Pulmonary:      Effort: Pulmonary effort is normal       Breath sounds: Normal breath sounds  Abdominal:      General: Bowel sounds are normal       Palpations: Abdomen is soft  Musculoskeletal:         General: Normal range of motion  Cervical back: Normal range of motion  Skin:     General: Skin is warm  Capillary Refill: Capillary refill takes less than 2 seconds  Neurological:      General: No focal deficit present  Mental Status: He is alert and oriented to person, place, and time  Psychiatric:         Mood and Affect: Mood normal          Behavior: Behavior normal          30 min with pt and all the above issues, including life style issues raised       This time was spent reviewing previous records, reviewing previous laboratory and other tests, taking history from patient, examination of patient, discussion of prognosis and treatment, ordering laboratory tests, ordering medications, and completion of the medical record

## 2022-10-12 PROBLEM — J01.20 ACUTE ETHMOIDAL SINUSITIS: Status: RESOLVED | Noted: 2022-01-18 | Resolved: 2022-10-12

## 2022-10-12 PROBLEM — R05.9 COUGH: Status: RESOLVED | Noted: 2022-01-18 | Resolved: 2022-10-12

## 2022-11-05 ENCOUNTER — HOSPITAL ENCOUNTER (EMERGENCY)
Facility: HOSPITAL | Age: 32
Discharge: HOME | End: 2022-11-05
Attending: EMERGENCY MEDICINE

## 2022-11-05 VITALS
DIASTOLIC BLOOD PRESSURE: 78 MMHG | RESPIRATION RATE: 18 BRPM | WEIGHT: 180 LBS | TEMPERATURE: 97.6 F | BODY MASS INDEX: 23.1 KG/M2 | SYSTOLIC BLOOD PRESSURE: 142 MMHG | HEIGHT: 74 IN | HEART RATE: 53 BPM | OXYGEN SATURATION: 100 %

## 2022-11-05 DIAGNOSIS — R00.2 PALPITATIONS: ICD-10-CM

## 2022-11-05 DIAGNOSIS — T50.905A ADVERSE EFFECT OF DRUG, INITIAL ENCOUNTER: Primary | ICD-10-CM

## 2022-11-05 DIAGNOSIS — R11.2 NAUSEA AND VOMITING: ICD-10-CM

## 2022-11-05 LAB
ALBUMIN SERPL BCP-MCNC: 4.8 G/DL (ref 3.5–5)
ALP SERPL-CCNC: 77 U/L (ref 34–104)
ALT SERPL W P-5'-P-CCNC: 9 U/L (ref 7–52)
ANION GAP SERPL CALCULATED.3IONS-SCNC: 9 MMOL/L (ref 4–13)
AST SERPL W P-5'-P-CCNC: 23 U/L (ref 13–39)
BASOPHILS # BLD AUTO: 0.02 THOUSANDS/ÂΜL (ref 0–0.1)
BASOPHILS NFR BLD AUTO: 0 % (ref 0–1)
BILIRUB SERPL-MCNC: 0.78 MG/DL (ref 0.2–1)
BUN SERPL-MCNC: 12 MG/DL (ref 5–25)
CALCIUM SERPL-MCNC: 10.1 MG/DL (ref 8.4–10.2)
CHLORIDE SERPL-SCNC: 102 MMOL/L (ref 96–108)
CO2 SERPL-SCNC: 27 MMOL/L (ref 21–32)
CREAT SERPL-MCNC: 0.76 MG/DL (ref 0.6–1.3)
EOSINOPHIL # BLD AUTO: 0.01 THOUSAND/ÂΜL (ref 0–0.61)
EOSINOPHIL NFR BLD AUTO: 0 % (ref 0–6)
ERYTHROCYTE [DISTWIDTH] IN BLOOD BY AUTOMATED COUNT: 12.5 % (ref 11.6–15.1)
ETHANOL SERPL-MCNC: <10 MG/DL
GFR SERPL CREATININE-BSD FRML MDRD: 120 ML/MIN/1.73SQ M
GLUCOSE SERPL-MCNC: 121 MG/DL (ref 65–140)
HCT VFR BLD AUTO: 45.3 % (ref 36.5–49.3)
HGB BLD-MCNC: 15.3 G/DL (ref 12–17)
IMM GRANULOCYTES # BLD AUTO: 0.02 THOUSAND/UL (ref 0–0.2)
IMM GRANULOCYTES NFR BLD AUTO: 0 % (ref 0–2)
LYMPHOCYTES # BLD AUTO: 1.07 THOUSANDS/ÂΜL (ref 0.6–4.47)
LYMPHOCYTES NFR BLD AUTO: 16 % (ref 14–44)
MAGNESIUM SERPL-MCNC: 2 MG/DL (ref 1.9–2.7)
MCH RBC QN AUTO: 28.3 PG (ref 26.8–34.3)
MCHC RBC AUTO-ENTMCNC: 33.8 G/DL (ref 31.4–37.4)
MCV RBC AUTO: 84 FL (ref 82–98)
MONOCYTES # BLD AUTO: 0.28 THOUSAND/ÂΜL (ref 0.17–1.22)
MONOCYTES NFR BLD AUTO: 4 % (ref 4–12)
NEUTROPHILS # BLD AUTO: 5.14 THOUSANDS/ÂΜL (ref 1.85–7.62)
NEUTS SEG NFR BLD AUTO: 80 % (ref 43–75)
NRBC BLD AUTO-RTO: 0 /100 WBCS
PLATELET # BLD AUTO: 248 THOUSANDS/UL (ref 149–390)
PMV BLD AUTO: 10.7 FL (ref 8.9–12.7)
POTASSIUM SERPL-SCNC: 4.5 MMOL/L (ref 3.5–5.3)
PROT SERPL-MCNC: 7.8 G/DL (ref 6.4–8.4)
RBC # BLD AUTO: 5.41 MILLION/UL (ref 3.88–5.62)
SODIUM SERPL-SCNC: 138 MMOL/L (ref 135–147)
WBC # BLD AUTO: 6.54 THOUSAND/UL (ref 4.31–10.16)

## 2022-11-05 RX ORDER — LORAZEPAM 2 MG/ML
0.5 INJECTION INTRAMUSCULAR ONCE
Status: COMPLETED | OUTPATIENT
Start: 2022-11-05 | End: 2022-11-05

## 2022-11-05 RX ORDER — ONDANSETRON 2 MG/ML
4 INJECTION INTRAMUSCULAR; INTRAVENOUS ONCE
Status: COMPLETED | OUTPATIENT
Start: 2022-11-05 | End: 2022-11-05

## 2022-11-05 RX ADMIN — ONDANSETRON 4 MG: 2 INJECTION INTRAMUSCULAR; INTRAVENOUS at 07:15

## 2022-11-05 RX ADMIN — LORAZEPAM 0.5 MG: 2 INJECTION INTRAMUSCULAR; INTRAVENOUS at 07:16

## 2022-11-05 RX ADMIN — SODIUM CHLORIDE 1000 ML: 0.9 INJECTION, SOLUTION INTRAVENOUS at 07:12

## 2022-11-05 NOTE — ED PROVIDER NOTES
History  Chief Complaint   Patient presents with   • Medical Problem     Patient reports someone gave patient a cigarette that had meth in it and patient states he was unaware  Patient is a 22-year-old male seen in the emergency department with concern for nausea and vomiting and palpitations after possible exposure to methamphetamine  Patient states that he smoked a cigarette that was given to him by a friend of a friend  Patient states that he did not feel well after smoking the cigarette last night at approximately 10:00 p m  Mihaela Kim Patient states that he developed nausea and vomiting x5 in addition to heart palpitations  Patient notes recurrence of symptoms through the evening  Patient notes no other definite clear exacerbating or alleviating factors for his symptoms  Patient states that he feels somewhat better in the emergency department  Prior to Admission Medications   Prescriptions Last Dose Informant Patient Reported?  Taking?   acyclovir (Zovirax) 800 mg tablet   No No   Sig: Take 1 tablet (800 mg total) by mouth 2 (two) times a day for 10 days   Patient not taking: Reported on 2022   diphenhydrAMINE HCl (ELIAN-SELTZER PLUS ALLERGY PO) Not Taking at Unknown time  Yes No   Sig: Take by mouth   Patient not taking: Reported on 2022   fluticasone (FLONASE) 50 mcg/act nasal spray Not Taking at Unknown time  Yes No   Si spray into each nostril daily   Patient not taking: No sig reported   oxymetazoline (AFRIN) 0 05 % nasal spray Not Taking at Unknown time  No No   Si sprays by Each Nare route 2 (two) times a day   Patient not taking: No sig reported   varenicline (CHANTIX) 1 mg tablet Not Taking at Unknown time  No No   Sig: TAKE ONE-HALF TABLET BY MOUTH ONCE DAILY FOR 3 DAYS, THEN TAKE ONE-HALF TABLET TWICE DAILY FOR 4 DAYS, THEN TAKE ONE TABLET TWICE DAILY   Patient not taking: Reported on 2022      Facility-Administered Medications: None       Past Medical History: Diagnosis Date   • Asperger's syndrome    • GERD (gastroesophageal reflux disease)    • Rectal prolapse    • Rectal prolapse 5/9/2018       Past Surgical History:   Procedure Laterality Date   • HERNIA REPAIR     • TN COLONOSCOPY FLX DX W/COLLJ SPEC WHEN PFRMD N/A 5/7/2018    Procedure: COLONOSCOPY;  Surgeon: Char Elmore MD;  [facility]; Service: Colorectal   • TN LAP, SURG PROCTOPEXY W/SIG RESECT N/A 5/8/2018    Procedure: LAPAROSCOPIC HAND-ASSIST REPAIR OF RECTAL PROLAPSE BY BEBETO Wilson;  Surgeon: Char Elmore MD;  [facility];  Service: Colorectal   • TN SIGMOIDOSCOPY FLX DX W/COLLJ SPEC BR/WA IF PFRMD N/A 6/19/2018    Procedure: Jamie Cheryl;  Surgeon: Char Elmore MD;  [facility]; Service: Colorectal   • SURGERY SCROTAL / TESTICULAR         Family History   Family history unknown: Yes     I have reviewed and agree with the history as documented  E-Cigarette/Vaping   • E-Cigarette Use Never User      E-Cigarette/Vaping Substances   • Nicotine No    • THC No    • CBD No    • Flavoring No    • Other No    • Unknown No      Social History     Tobacco Use   • Smoking status: Current Every Day Smoker     Packs/day: 1 00   • Smokeless tobacco: Never Used   Vaping Use   • Vaping Use: Never used   Substance Use Topics   • Alcohol use: No   • Drug use: No       Review of Systems   Constitutional: Negative for chills and fever  HENT: Negative for ear pain and sore throat  Eyes: Negative for pain and visual disturbance  Respiratory: Negative for cough and shortness of breath  Cardiovascular: Positive for palpitations  Negative for chest pain  Gastrointestinal: Positive for nausea and vomiting  Negative for abdominal pain  Genitourinary: Negative for decreased urine volume and difficulty urinating  Musculoskeletal: Negative for arthralgias and back pain  Skin: Negative for color change and rash     Neurological: Negative for seizures and syncope  Psychiatric/Behavioral: Negative for agitation  The patient is nervous/anxious  All other systems reviewed and are negative  Physical Exam  Physical Exam  Vitals and nursing note reviewed  Constitutional:       Appearance: He is well-developed  He is not ill-appearing  HENT:      Head: Normocephalic and atraumatic  Right Ear: External ear normal       Left Ear: External ear normal       Nose: Nose normal       Mouth/Throat:      Pharynx: Oropharynx is clear  Eyes:      General: No scleral icterus  Conjunctiva/sclera: Conjunctivae normal    Cardiovascular:      Rate and Rhythm: Regular rhythm  Bradycardia present  Heart sounds: No murmur heard  Pulmonary:      Effort: Pulmonary effort is normal  No respiratory distress  Breath sounds: Normal breath sounds  Abdominal:      General: There is no distension  Palpations: Abdomen is soft  Tenderness: There is no abdominal tenderness  Musculoskeletal:         General: No deformity or signs of injury  Cervical back: Normal range of motion and neck supple  Skin:     General: Skin is warm and dry  Neurological:      General: No focal deficit present  Mental Status: He is alert  Cranial Nerves: No cranial nerve deficit  Sensory: No sensory deficit  Psychiatric:         Thought Content:  Thought content normal       Comments: appears anxious         Vital Signs  ED Triage Vitals [11/05/22 0639]   Temperature Pulse Respirations Blood Pressure SpO2   97 6 °F (36 4 °C) (!) 53 18 142/78 100 %      Temp Source Heart Rate Source Patient Position - Orthostatic VS BP Location FiO2 (%)   Oral Monitor Sitting Right arm --      Pain Score       No Pain           Vitals:    11/05/22 0639   BP: 142/78   Pulse: (!) 53   Patient Position - Orthostatic VS: Sitting         Visual Acuity      ED Medications  Medications   sodium chloride 0 9 % bolus 1,000 mL (1,000 mL Intravenous New Bag 11/5/22 0712) ondansetron [facility]) injection 4 mg (4 mg Intravenous Given 11/5/22 0715)   LORazepam (ATIVAN) injection 0 5 mg (0 5 mg Intravenous Given 11/5/22 0716)       Diagnostic Studies  Results Reviewed     Procedure Component Value Units Date/Time    Comprehensive metabolic panel [[id number]] Collected: 11/05/22 0707    Lab Status: Final result Specimen: Blood from Arm, Left Updated: 11/05/22 0735     Sodium 138 mmol/L      Potassium 4 5 mmol/L      Chloride 102 mmol/L      CO2 27 mmol/L      ANION GAP 9 mmol/L      BUN 12 mg/dL      Creatinine 0 76 mg/dL      Glucose 121 mg/dL      Calcium 10 1 mg/dL      AST 23 U/L      ALT 9 U/L      Alkaline Phosphatase 77 U/L      Total Protein 7 8 g/dL      Albumin 4 8 g/dL      Total Bilirubin 0 78 mg/dL      eGFR 120 ml/min/1 73sq m     Narrative:      [place] guidelines for Chronic Kidney Disease (CKD):   •  Stage 1 with normal or high GFR (GFR > 90 mL/min/1 73 square meters)  •  Stage 2 Mild CKD (GFR = 60-89 mL/min/1 73 square meters)  •  Stage 3A Moderate CKD (GFR = 45-59 mL/min/1 73 square meters)  •  Stage 3B Moderate CKD (GFR = 30-44 mL/min/1 73 square meters)  •  Stage 4 Severe CKD (GFR = 15-29 mL/min/1 73 square meters)  •  Stage 5 End Stage CKD (GFR <15 mL/min/1 73 square meters)  Note: GFR calculation is accurate only with a steady state creatinine    Magnesium [[id number]]  (Normal) Collected: 11/05/22 0707    Lab Status: Final result Specimen: Blood from Arm, Left Updated: 11/05/22 0735     Magnesium 2 0 mg/dL     Ethanol [[id number]]  (Normal) Collected: 11/05/22 0707    Lab Status: Final result Specimen: Blood from Arm, Left Updated: 11/05/22 0735     Ethanol Lvl <10 mg/dL     CBC and differential [[id number]]  (Abnormal) Collected: 11/05/22 0707    Lab Status: Final result Specimen: Blood from Arm, Left Updated: 11/05/22 0712     WBC 6 54 Thousand/uL      RBC 5 41 Million/uL      Hemoglobin 15 3 g/dL      Hematocrit 45 3 %      MCV 84 fL MCH 28 3 pg      MCHC 33 8 g/dL      RDW 12 5 %      MPV 10 7 fL      Platelets 980 Thousands/uL      nRBC 0 /100 WBCs      Neutrophils Relative 80 %      Immat GRANS % 0 %      Lymphocytes Relative 16 %      Monocytes Relative 4 %      Eosinophils Relative 0 %      Basophils Relative 0 %      Neutrophils Absolute 5 14 Thousands/µL      Immature Grans Absolute 0 02 Thousand/uL      Lymphocytes Absolute 1 07 Thousands/µL      Monocytes Absolute 0 28 Thousand/µL      Eosinophils Absolute 0 01 Thousand/µL      Basophils Absolute 0 02 Thousands/µL                  No orders to display              Procedures  ECG 12 Lead Documentation Only    Date/Time: 11/5/2022 7:17 AM  Performed by: Sofia Buckley MD  Authorized by: Sofia Buckley MD     Indications / Diagnosis:  Palpitations  ECG reviewed by me, the ED Provider: yes    Patient location:  ED  Rate:     ECG rate:  52    ECG rate assessment: bradycardic    Rhythm:     Rhythm: sinus bradycardia    QRS:     QRS axis:  Normal  ST segments:     ST segments:  Non-specific  T waves:     T waves: non-specific    Comments:      Sinus bradycardia at 52, normal axis, , QRS 96, QTc 414, nonspecific ST-T wave abnormality, no definite evidence of acute ischemia             ED Course                                             MDM  Number of Diagnoses or Management Options  Adverse effect of drug, initial encounter  Nausea and vomiting  Palpitations  Diagnosis management comments: Patient is a 70-year-old male seen in the emergency department with concern for nausea/vomiting and palpitations in the setting of possible exposure to methamphetamine  EKG was obtained and noted  Patient was treated with medication for symptom control, with good effect  Laboratory evaluation remarkable for white blood cell count of 6 54, 80% neutrophils  Patient's symptoms are suggestive of possible drug exposure  On re-evaluation, patient is feeling better in the emergency department  Patient denies suicidal and homicidal ideation, and appears to be at no imminent risk to himself or others  Plan to have patient follow up with PCP  Patient stable for discharge home  Discharge instructions were reviewed with patient  Amount and/or Complexity of Data Reviewed  Clinical lab tests: ordered and reviewed  Tests in the medicine section of CPT®: ordered and reviewed        Disposition  Final diagnoses: Adverse effect of drug, initial encounter   Nausea and vomiting   Palpitations     Time reflects when diagnosis was documented in both MDM as applicable and the Disposition within this note     Time User Action Codes Description Comment    11/5/2022  6:56 AM Bettyjane Ganser Add [T50 905A] Adverse effect of drug, initial encounter     11/5/2022  6:56 AM Bettyjane Ganser Add [R11 2] Nausea and vomiting     11/5/2022  6:56 AM Bettyjane Ganser Add [R00 2] Palpitations       ED Disposition     ED Disposition   Discharge    Condition   Stable    Date/Time   Sat Nov 5, 2022  7:54 AM    Comment   Leslie Scheuermann discharge to home/self care  Follow-up Information     Follow up With Specialties Details Why Contact Info    Ioana Leblanc DO Family Medicine Call in 1 day  [facility] [address]   [phone]            Patient's Medications   Discharge Prescriptions    No medications on file       No discharge procedures on file      PDMP Review     None          ED Provider  Electronically Signed by           Yaritza Stephenson MD  11/05/22 1392

## 2022-11-06 LAB
ATRIAL RATE: 52 BPM
P AXIS: 25 DEGREES
PR INTERVAL: 120 MS
QRS AXIS: 38 DEGREES
QRSD INTERVAL: 96 MS
QT INTERVAL: 446 MS
QTC INTERVAL: 414 MS
T WAVE AXIS: 42 DEGREES
VENTRICULAR RATE: 52 BPM

## 2023-01-06 ENCOUNTER — APPOINTMENT (EMERGENCY)
Dept: RADIOLOGY | Facility: HOSPITAL | Age: 33
End: 2023-01-06

## 2023-01-06 ENCOUNTER — HOSPITAL ENCOUNTER (EMERGENCY)
Facility: HOSPITAL | Age: 33
Discharge: HOME | End: 2023-01-06
Attending: INTERNAL MEDICINE

## 2023-01-06 VITALS
TEMPERATURE: 98.9 F | RESPIRATION RATE: 18 BRPM | SYSTOLIC BLOOD PRESSURE: 119 MMHG | HEART RATE: 83 BPM | OXYGEN SATURATION: 98 % | DIASTOLIC BLOOD PRESSURE: 85 MMHG

## 2023-01-06 DIAGNOSIS — M25.561 RIGHT KNEE PAIN, UNSPECIFIED CHRONICITY: Primary | ICD-10-CM

## 2023-01-06 RX ORDER — IBUPROFEN 600 MG/1
600 TABLET ORAL ONCE
Status: COMPLETED | OUTPATIENT
Start: 2023-01-06 | End: 2023-01-06

## 2023-01-06 RX ADMIN — IBUPROFEN 600 MG: 600 TABLET, FILM COATED ORAL at 00:29

## 2023-01-06 NOTE — ED PROVIDER NOTES
History  Chief Complaint   Patient presents with   • Leg Pain     Patient presents for a lump on his right leg and pain shooting down  it  Patient struggled to find lump in triage  This is a 28years old came for having pain at the right knee  Patient stated that he has a lump at the most  proximal upper right leg and he has some pain  Patient walks normally  Patient denies fall or  trauma to the knee  Patient denies any fever  Patient denies other complaints  Asked the patient to find was a lump and he cannot find it  Prior to Admission Medications   Prescriptions Last Dose Informant Patient Reported? Taking?   acyclovir (Zovirax) 800 mg tablet   No No   Sig: Take 1 tablet (800 mg total) by mouth 2 (two) times a day for 10 days   Patient not taking: Reported on 2022   diphenhydrAMINE HCl (ELIAN-SELTZER PLUS ALLERGY PO)   Yes No   Sig: Take by mouth   Patient not taking: Reported on 2022   fluticasone (FLONASE) 50 mcg/act nasal spray   Yes No   Si spray into each nostril daily   Patient not taking: No sig reported   oxymetazoline (AFRIN) 0 05 % nasal spray   No No   Si sprays by Each Nare route 2 (two) times a day   Patient not taking: No sig reported   varenicline (CHANTIX) 1 mg tablet   No No   Sig: TAKE ONE-HALF TABLET BY MOUTH ONCE DAILY FOR 3 DAYS, THEN TAKE ONE-HALF TABLET TWICE DAILY FOR 4 DAYS, THEN TAKE ONE TABLET TWICE DAILY   Patient not taking: Reported on 2022      Facility-Administered Medications: None       Past Medical History:   Diagnosis Date   • Asperger's syndrome    • GERD (gastroesophageal reflux disease)    • Rectal prolapse    • Rectal prolapse 2018       Past Surgical History:   Procedure Laterality Date   • HERNIA REPAIR     • ME COLONOSCOPY FLX DX W/COLLJ SPEC WHEN PFRMD N/A 2018    Procedure: COLONOSCOPY;  Surgeon: Orestes Nam MD;  [facility];   Service: Colorectal   • ME LAPAROSCOPY PROCTOPEXY PROLAPSE SIGMOID RESCJ N/A 5/8/2018    Procedure: LAPAROSCOPIC HAND-ASSIST REPAIR OF RECTAL PROLAPSE BY BEBETO King;  Surgeon: Issac Medina MD;  [facility];  Service: Colorectal   • CA SIGMOIDOSCOPY FLX DX W/COLLJ SPEC BR/WA IF PFRMD N/A 6/19/2018    Procedure: Leahjoselito Braun;  Surgeon: Issac Medina MD;  [facility]; Service: Colorectal   • SURGERY SCROTAL / TESTICULAR         Family History   Family history unknown: Yes     I have reviewed and agree with the history as documented  E-Cigarette/Vaping   • E-Cigarette Use Never User      E-Cigarette/Vaping Substances   • Nicotine No    • THC No    • CBD No    • Flavoring No    • Other No    • Unknown No      Social History     Tobacco Use   • Smoking status: Every Day     Packs/day: 1 00     Types: Cigarettes   • Smokeless tobacco: Never   Vaping Use   • Vaping Use: Never used   Substance Use Topics   • Alcohol use: No   • Drug use: No       Review of Systems   Constitutional: Negative for diaphoresis, fatigue and fever  HENT: Negative for congestion, ear pain, facial swelling, sinus pressure, sinus pain, sneezing, sore throat and trouble swallowing  Respiratory: Negative for cough, chest tightness and shortness of breath  Cardiovascular: Negative for chest pain, palpitations and leg swelling  Gastrointestinal: Negative for abdominal pain, diarrhea, nausea and vomiting  Genitourinary: Negative for difficulty urinating, dysuria, flank pain and hematuria  Musculoskeletal: Negative for arthralgias, back pain, gait problem, joint swelling, myalgias, neck pain and neck stiffness  Skin: Negative for color change, pallor and rash  Neurological: Negative for dizziness, light-headedness and headaches  Hematological: Negative for adenopathy  Does not bruise/bleed easily  Psychiatric/Behavioral: Negative for agitation and behavioral problems  Physical Exam  Physical Exam  Vitals and nursing note reviewed     Constitutional: General: He is not in acute distress  Appearance: He is well-developed  He is not ill-appearing, toxic-appearing or diaphoretic  HENT:      Head: Normocephalic and atraumatic  Right Ear: Ear canal normal       Left Ear: Ear canal normal       Nose: Nose normal  No congestion or rhinorrhea  Mouth/Throat:      Mouth: Mucous membranes are moist       Pharynx: No oropharyngeal exudate or posterior oropharyngeal erythema  Eyes:      Extraocular Movements: Extraocular movements intact  Pupils: Pupils are equal, round, and reactive to light  Cardiovascular:      Rate and Rhythm: Normal rate and regular rhythm  Pulses: Normal pulses  Heart sounds: Normal heart sounds  Pulmonary:      Effort: Pulmonary effort is normal       Breath sounds: Normal breath sounds  Abdominal:      General: Bowel sounds are normal       Palpations: Abdomen is soft  Musculoskeletal:         General: No swelling, tenderness, deformity or signs of injury  Normal range of motion  Cervical back: Normal range of motion and neck supple  Right lower leg: No edema  Left lower leg: No edema  Comments: Examination of the right knee shows no tenderness, ecchymosis, redness, effusion  Has full range of motion  Examination of the right leg shows no calf tenderness  Right leg sensation neurovascular intact  Skin:     General: Skin is warm and dry  Coloration: Skin is not jaundiced or pale  Findings: No bruising, erythema, lesion or rash  Neurological:      Mental Status: He is alert and oriented to person, place, and time     Psychiatric:         Behavior: Behavior normal          Vital Signs  ED Triage Vitals [01/06/23 0023]   Temperature Pulse Respirations Blood Pressure SpO2   98 9 °F (37 2 °C) 83 18 119/85 98 %      Temp src Heart Rate Source Patient Position - Orthostatic VS BP Location FiO2 (%)   -- -- Lying Right arm --      Pain Score       --           Vitals:    01/06/23 0023   BP: 119/85   Pulse: 83   Patient Position - Orthostatic VS: Lying         Visual Acuity      ED Medications  Medications   ibuprofen (MOTRIN) tablet 600 mg (600 mg Oral Given 1/6/23 0029)       Diagnostic Studies  Results Reviewed     None                 XR knee 4+ views Right injury   ED Interpretation by Dolores Bell MD (01/06 0045)   X ray R knee; no osseous abnormalities                 Procedures  Procedures         ED Course                                             Medical Decision Making  This is a 28years old came for having pain at the right knee  Patient also stated that he has a lump at the most proximal area of the right leg  Home patient examined there is no abnormal finding  X-ray right knee shows no osseous abnormalities  Patient was giving Motrin  The exam of the right knee and the right leg is normal and patient has normal gait  At this time we will get discharge patient home and advised to follow-up with orthopedic all question concerns of patient has been fully addressed  Amount and/or Complexity of Data Reviewed  Radiology: ordered and independent interpretation performed  Details: x ray R knee; no osseous findings      Risk  Prescription drug management  Disposition  Final diagnoses:   Right knee pain, unspecified chronicity     Time reflects when diagnosis was documented in both MDM as applicable and the Disposition within this note     Time User Action Codes Description Comment    1/6/2023 12:51 AM rAun Drew Add [M25 561] Right knee pain, unspecified chronicity       ED Disposition     ED Disposition   Discharge    Condition   Stable    Date/Time   Fri Jan 6, 2023 12:54 AM    Comment   Blanca Bailey discharge to home/self care                 Follow-up Information     Follow up With Specialties Details Why [id number] [facility] In 1 week  [id number] [facility] [address]            Discharge Medication List as of 1/6/2023 12:54 AM      CONTINUE these medications which have NOT CHANGED    Details   acyclovir (Zovirax) 800 mg tablet Take 1 tablet (800 mg total) by mouth 2 (two) times a day for 10 days, Starting Tue 4/19/2022, Until Fri 4/29/2022, Normal      diphenhydrAMINE HCl (ELIAN-SELTZER PLUS ALLERGY PO) Take by mouth, Historical Med      fluticasone (FLONASE) 50 mcg/act nasal spray 1 spray into each nostril daily, Historical Med      oxymetazoline (AFRIN) 0 05 % nasal spray 2 sprays by Each Nare route 2 (two) times a day, Starting Tue 1/18/2022, Normal      varenicline (CHANTIX) 1 mg tablet TAKE ONE-HALF TABLET BY MOUTH ONCE DAILY FOR 3 DAYS, THEN TAKE ONE-HALF TABLET TWICE DAILY FOR 4 DAYS, THEN TAKE ONE TABLET TWICE DAILY, Normal             No discharge procedures on file      PDMP Review     None          ED Provider  Electronically Signed by           Citlalli Coffey MD  01/06/23 9009

## 2023-01-06 NOTE — ED NOTES
RN entered room following loud screaming heard through closed door  Asked if everything was ok, patient states "No, I would like him escorted out of here" pointing to other male in the room  Patient states this is his "now ex-fiance" and reiterated that he did not want him near him  Other individual escorted out to the waiting room by security Michael Rajan RN  01/06/23 9665

## 2023-01-06 NOTE — DISCHARGE INSTRUCTIONS
Follow up with orthopedics dr Mili Ramirez    XR knee 4+ views Right injury   ED Interpretation   X ray R knee; no osseous abnormalities

## 2023-02-28 ENCOUNTER — OFFICE VISIT (OUTPATIENT)
Dept: FAMILY MEDICINE CLINIC | Facility: CLINIC | Age: 33
End: 2023-02-28

## 2023-02-28 VITALS
TEMPERATURE: 98.7 F | SYSTOLIC BLOOD PRESSURE: 118 MMHG | HEART RATE: 85 BPM | OXYGEN SATURATION: 97 % | BODY MASS INDEX: 19.58 KG/M2 | WEIGHT: 152.6 LBS | DIASTOLIC BLOOD PRESSURE: 84 MMHG | HEIGHT: 74 IN

## 2023-02-28 DIAGNOSIS — E55.9 VITAMIN D INSUFFICIENCY: ICD-10-CM

## 2023-02-28 DIAGNOSIS — E78.2 MIXED HYPERLIPIDEMIA: ICD-10-CM

## 2023-02-28 DIAGNOSIS — R63.4 EXCESSIVE BODY WEIGHT LOSS: Primary | ICD-10-CM

## 2023-02-28 DIAGNOSIS — R53.83 FATIGUE, UNSPECIFIED TYPE: ICD-10-CM

## 2023-02-28 NOTE — PROGRESS NOTES
Assessment/Plan:  36 yo seen with Mom  Wt loss and methamphetamine abuse x 3-4 mo's  Has ecchymosis on both forearms from recent injections of meth by "friends"  1  Excessive body weight loss  -     Ambulatory Referral to Drug Rehabilitation; Future; Expected date: 03/07/2023  -     CBC; Future  -     UA w Reflex to Microscopic w Reflex to Culture  -     Comprehensive metabolic panel; Future  -     Lipid panel; Future  -     TSH, 3rd generation; Future  -     Vitamin D 25 hydroxy; Future    2  Fatigue, unspecified type  -     CBC; Future  -     UA w Reflex to Microscopic w Reflex to Culture  -     Comprehensive metabolic panel; Future  -     TSH, 3rd generation; Future    3  Vitamin D insufficiency  -     Vitamin D 25 hydroxy; Future    4  Mixed hyperlipidemia  -     Lipid panel; Future          Subjective:      Patient ID: Michael Stout is a 35 y o  male  HPI    The following portions of the patient's history were reviewed and updated as appropriate: He  has a past medical history of Asperger's syndrome, GERD (gastroesophageal reflux disease), Rectal prolapse, and Rectal prolapse (5/9/2018)  He   Patient Active Problem List    Diagnosis Date Noted   • Smoking 01/18/2022   • Nasal congestion 01/18/2022   • Autism spectrum 04/06/2021   • Environmental and seasonal allergies 11/10/2020   • Personal history of colonic polyps 03/19/2019   • Rectal bleed 06/19/2018   • Rectal prolapse 05/09/2018     He  has a past surgical history that includes Hernia repair; Surgery scrotal / testicular; pr colonoscopy flx dx w/collj spec when pfrmd (N/A, 5/7/2018); pr laparoscopy proctopexy prolapse sigmoid rescj (N/A, 5/8/2018); and pr sigmoidoscopy flx dx w/collj spec br/wa if pfrmd (N/A, 6/19/2018)  His Family history is unknown by patient  He  reports that he has been smoking cigarettes  He has been smoking an average of 1 pack per day  He has never used smokeless tobacco  He reports current drug use   Drug: Methamphetamines  He reports that he does not drink alcohol  Current Outpatient Medications   Medication Sig Dispense Refill   • acyclovir (Zovirax) 800 mg tablet Take 1 tablet (800 mg total) by mouth 2 (two) times a day for 10 days (Patient not taking: Reported on 7/26/2022) 20 tablet 0   • diphenhydrAMINE HCl (ELIAN-SELTZER PLUS ALLERGY PO) Take by mouth (Patient not taking: Reported on 11/5/2022)     • fluticasone (FLONASE) 50 mcg/act nasal spray 1 spray into each nostril daily (Patient not taking: Reported on 12/14/2021)     • oxymetazoline (AFRIN) 0 05 % nasal spray 2 sprays by Each Nare route 2 (two) times a day (Patient not taking: Reported on 7/26/2022) 30 mL 0   • varenicline (CHANTIX) 1 mg tablet TAKE ONE-HALF TABLET BY MOUTH ONCE DAILY FOR 3 DAYS, THEN TAKE ONE-HALF TABLET TWICE DAILY FOR 4 DAYS, THEN TAKE ONE TABLET TWICE DAILY (Patient not taking: Reported on 11/5/2022) 53 tablet 0     No current facility-administered medications for this visit  Current Outpatient Medications on File Prior to Visit   Medication Sig   • acyclovir (Zovirax) 800 mg tablet Take 1 tablet (800 mg total) by mouth 2 (two) times a day for 10 days (Patient not taking: Reported on 7/26/2022)   • diphenhydrAMINE HCl (ELIAN-SELTZER PLUS ALLERGY PO) Take by mouth (Patient not taking: Reported on 11/5/2022)   • fluticasone (FLONASE) 50 mcg/act nasal spray 1 spray into each nostril daily (Patient not taking: Reported on 12/14/2021)   • oxymetazoline (AFRIN) 0 05 % nasal spray 2 sprays by Each Nare route 2 (two) times a day (Patient not taking: Reported on 7/26/2022)   • varenicline (CHANTIX) 1 mg tablet TAKE ONE-HALF TABLET BY MOUTH ONCE DAILY FOR 3 DAYS, THEN TAKE ONE-HALF TABLET TWICE DAILY FOR 4 DAYS, THEN TAKE ONE TABLET TWICE DAILY (Patient not taking: Reported on 11/5/2022)     No current facility-administered medications on file prior to visit  He has No Known Allergies       Review of Systems   Constitutional: Negative for activity change, appetite change, chills, diaphoresis, fatigue, fever and unexpected weight change  HENT: Negative for congestion, dental problem, drooling, ear discharge, ear pain, facial swelling, mouth sores, nosebleeds, postnasal drip, rhinorrhea, trouble swallowing and voice change  Eyes: Negative for photophobia, pain, discharge, redness, itching and visual disturbance  Respiratory: Negative for apnea, cough, choking, chest tightness and shortness of breath  Smoking about 10 cigaretts a day - claims he wants to stop  Thinking aobut Chantix - but I told him I'm not in favor of that due to mental health issues  He agrees  Will return to the patch and decrease smoking by one cigarette/d a wk   Cardiovascular: Negative for chest pain and leg swelling  Gastrointestinal: Negative for abdominal distention, abdominal pain, constipation, diarrhea and nausea  Endocrine: Negative for polydipsia, polyphagia and polyuria  Genitourinary: Negative for decreased urine volume, difficulty urinating, dysuria, enuresis and hematuria  Father  of renal cancer - age 46   Musculoskeletal: Positive for arthralgias  Negative for back pain, gait problem and joint swelling  Skin: Negative for color change, pallor, rash and wound  Allergic/Immunologic: Negative for immunocompromised state  Neurological: Negative for dizziness, seizures, syncope, facial asymmetry, speech difficulty, light-headedness and headaches  Hematological: Negative for adenopathy  Psychiatric/Behavioral: Negative for agitation, behavioral problems, confusion and decreased concentration  Objective:      /84 (BP Location: Right arm, Patient Position: Sitting, Cuff Size: Standard)   Pulse 85   Temp 98 7 °F (37 1 °C) (Temporal)   Ht 6' 2" (1 88 m)   Wt 69 2 kg (152 lb 9 6 oz)   SpO2 97%   BMI 19 59 kg/m²          Physical Exam  Vitals and nursing note reviewed     Constitutional:       General: He is not in acute distress  Appearance: Normal appearance  He is well-developed and normal weight  He is not ill-appearing, toxic-appearing or diaphoretic  HENT:      Head: Normocephalic and atraumatic  Right Ear: Tympanic membrane, ear canal and external ear normal  No drainage, swelling or tenderness  Left Ear: Tympanic membrane, ear canal and external ear normal  No drainage, swelling or tenderness  Nose: Nose normal  No congestion or rhinorrhea  Mouth/Throat:      Mouth: Mucous membranes are moist  No oral lesions  Pharynx: Pharyngeal swelling (very enlarted tonsils ) present  No oropharyngeal exudate or uvula swelling  Tonsils: Tonsillar exudate present  No tonsillar abscesses  0 on the right  0 on the left  Eyes:      Extraocular Movements: Extraocular movements intact  Conjunctiva/sclera: Conjunctivae normal       Pupils: Pupils are equal, round, and reactive to light  Comments: Slight redness of the throat noted, with white exudate  Strep testing negative  Cardiovascular:      Rate and Rhythm: Normal rate and regular rhythm  Pulses: Normal pulses  Heart sounds: Normal heart sounds  No murmur heard  No friction rub  Pulmonary:      Effort: No respiratory distress  Breath sounds: Normal breath sounds  Abdominal:      General: Bowel sounds are normal       Palpations: Abdomen is soft  Musculoskeletal:         General: Normal range of motion  Cervical back: Normal range of motion  Skin:     General: Skin is warm  Capillary Refill: Capillary refill takes less than 2 seconds  Neurological:      General: No focal deficit present  Mental Status: He is alert and oriented to person, place, and time  Psychiatric:         Mood and Affect: Mood normal          Behavior: Behavior normal          Thought Content:  Thought content normal          Judgment: Judgment normal              This time was spent reviewing previous records, reviewing previous laboratory and other tests, taking history from patient, examination of patient, discussion of prognosis and treatment, ordering laboratory tests, ordering medications, and completion of the medical record

## 2023-03-06 ENCOUNTER — APPOINTMENT (EMERGENCY)
Dept: RADIOLOGY | Facility: HOSPITAL | Age: 33
End: 2023-03-06

## 2023-03-06 ENCOUNTER — HOSPITAL ENCOUNTER (EMERGENCY)
Facility: HOSPITAL | Age: 33
Discharge: HOME | End: 2023-03-06
Attending: EMERGENCY MEDICINE | Admitting: EMERGENCY MEDICINE

## 2023-03-06 VITALS
OXYGEN SATURATION: 100 % | RESPIRATION RATE: 20 BRPM | HEART RATE: 99 BPM | SYSTOLIC BLOOD PRESSURE: 110 MMHG | DIASTOLIC BLOOD PRESSURE: 87 MMHG | TEMPERATURE: 97.5 F

## 2023-03-06 DIAGNOSIS — F15.10 METHAMPHETAMINE ABUSE (HCC): Primary | ICD-10-CM

## 2023-03-06 LAB
ANION GAP SERPL CALCULATED.3IONS-SCNC: 6 MMOL/L (ref 4–13)
BASOPHILS # BLD AUTO: 0.03 THOUSANDS/ÂΜL (ref 0–0.1)
BASOPHILS NFR BLD AUTO: 1 % (ref 0–1)
BUN SERPL-MCNC: 11 MG/DL (ref 5–25)
CALCIUM SERPL-MCNC: 9.7 MG/DL (ref 8.4–10.2)
CARDIAC TROPONIN I PNL SERPL HS: <2 NG/L
CHLORIDE SERPL-SCNC: 101 MMOL/L (ref 96–108)
CO2 SERPL-SCNC: 29 MMOL/L (ref 21–32)
CREAT SERPL-MCNC: 0.71 MG/DL (ref 0.6–1.3)
EOSINOPHIL # BLD AUTO: 0.07 THOUSAND/ÂΜL (ref 0–0.61)
EOSINOPHIL NFR BLD AUTO: 1 % (ref 0–6)
ERYTHROCYTE [DISTWIDTH] IN BLOOD BY AUTOMATED COUNT: 12.9 % (ref 11.6–15.1)
GFR SERPL CREATININE-BSD FRML MDRD: 123 ML/MIN/1.73SQ M
GLUCOSE SERPL-MCNC: 88 MG/DL (ref 65–140)
HCT VFR BLD AUTO: 46.8 % (ref 36.5–49.3)
HGB BLD-MCNC: 15.9 G/DL (ref 12–17)
IMM GRANULOCYTES # BLD AUTO: 0.01 THOUSAND/UL (ref 0–0.2)
IMM GRANULOCYTES NFR BLD AUTO: 0 % (ref 0–2)
LYMPHOCYTES # BLD AUTO: 1.89 THOUSANDS/ÂΜL (ref 0.6–4.47)
LYMPHOCYTES NFR BLD AUTO: 30 % (ref 14–44)
MCH RBC QN AUTO: 28.2 PG (ref 26.8–34.3)
MCHC RBC AUTO-ENTMCNC: 34 G/DL (ref 31.4–37.4)
MCV RBC AUTO: 83 FL (ref 82–98)
MONOCYTES # BLD AUTO: 0.52 THOUSAND/ÂΜL (ref 0.17–1.22)
MONOCYTES NFR BLD AUTO: 8 % (ref 4–12)
NEUTROPHILS # BLD AUTO: 3.85 THOUSANDS/ÂΜL (ref 1.85–7.62)
NEUTS SEG NFR BLD AUTO: 60 % (ref 43–75)
NRBC BLD AUTO-RTO: 0 /100 WBCS
PLATELET # BLD AUTO: 225 THOUSANDS/UL (ref 149–390)
PMV BLD AUTO: 9.6 FL (ref 8.9–12.7)
POTASSIUM SERPL-SCNC: 3.5 MMOL/L (ref 3.5–5.3)
RBC # BLD AUTO: 5.63 MILLION/UL (ref 3.88–5.62)
SODIUM SERPL-SCNC: 136 MMOL/L (ref 135–147)
WBC # BLD AUTO: 6.37 THOUSAND/UL (ref 4.31–10.16)

## 2023-03-07 LAB
ATRIAL RATE: 95 BPM
P AXIS: 77 DEGREES
PR INTERVAL: 136 MS
QRS AXIS: 77 DEGREES
QRSD INTERVAL: 88 MS
QT INTERVAL: 348 MS
QTC INTERVAL: 437 MS
T WAVE AXIS: 67 DEGREES
VENTRICULAR RATE: 95 BPM

## 2023-03-07 NOTE — ED PROVIDER NOTES
History  Chief Complaint   Patient presents with   • Recreational Drug Use     Pt presents via EMS after using meth, pt states 3 hour ago "a friend injected me with meth" pt states he uses frequently but usually smokes it  Pt c/o of arm pain in area where friend injected him  70-year-old male with history of autism spectrum disorder and GERD presents with complaint of chest pain and left arm pain after injecting meth approximately 3 hours ago  His arm pain is at the injection site in his forearm but he states it radiates up to his shoulder  He states that the chest pain feels like a "tugging sensation "  It is not respirophasic  No shortness of breath  No fevers or chills  No history of similar symptoms after stimulant use  The patient reports that he has been using meth frequently over the past few months initially smoking but more recently injecting  Prior to Admission Medications   Prescriptions Last Dose Informant Patient Reported?  Taking?   acyclovir (Zovirax) 800 mg tablet   No No   Sig: Take 1 tablet (800 mg total) by mouth 2 (two) times a day for 10 days   Patient not taking: Reported on 2022   diphenhydrAMINE HCl (ELIAN-SELTZER PLUS ALLERGY PO)   Yes No   Sig: Take by mouth   Patient not taking: Reported on 2022   fluticasone (FLONASE) 50 mcg/act nasal spray   Yes No   Si spray into each nostril daily   Patient not taking: Reported on 2021   oxymetazoline (AFRIN) 0 05 % nasal spray   No No   Si sprays by Each Nare route 2 (two) times a day   Patient not taking: Reported on 2022   varenicline (CHANTIX) 1 mg tablet   No No   Sig: TAKE ONE-HALF TABLET BY MOUTH ONCE DAILY FOR 3 DAYS, THEN TAKE ONE-HALF TABLET TWICE DAILY FOR 4 DAYS, THEN TAKE ONE TABLET TWICE DAILY   Patient not taking: Reported on 2022      Facility-Administered Medications: None       Past Medical History:   Diagnosis Date   • Asperger's syndrome    • GERD (gastroesophageal reflux disease) • Rectal prolapse    • Rectal prolapse 5/9/2018       Past Surgical History:   Procedure Laterality Date   • HERNIA REPAIR     • MT COLONOSCOPY FLX DX W/COLLJ SPEC WHEN PFRMD N/A 5/7/2018    Procedure: COLONOSCOPY;  Surgeon: Sharan Hutton MD;  Location: [facility]; Service: Colorectal   • MT LAPAROSCOPY PROCTOPEXY PROLAPSE SIGMOID [id number] N/A 5/8/2018    Procedure: LAPAROSCOPIC HAND-ASSIST REPAIR OF RECTAL PROLAPSE BY [facility] Aldrich;  Surgeon: Sharan Hutton MD;  Location: [facility];  Service: Colorectal   • MT SIGMOIDOSCOPY FLX DX W/COLLJ SPEC BR/WA IF PFRMD N/A 6/19/2018    Procedure: Mesfin Reddy;  Surgeon: Sharan Hutton MD;  Location: [facility]; Service: Colorectal   • SURGERY SCROTAL / TESTICULAR         Family History   Family history unknown: Yes     I have reviewed and agree with the history as documented  E-Cigarette/Vaping   • E-Cigarette Use Never User      E-Cigarette/Vaping Substances   • Nicotine No    • THC No    • CBD No    • Flavoring No    • Other No    • Unknown No      Social History     Tobacco Use   • Smoking status: Every Day     Packs/day: 1 00     Types: Cigarettes   • Smokeless tobacco: Never   Vaping Use   • Vaping Use: Never used   Substance Use Topics   • Alcohol use: No   • Drug use: Yes     Types: Methamphetamines     Comment: Daily user -- would like to quit 2-28-23       Review of Systems   Constitutional: Negative for fever  Respiratory: Negative for cough and shortness of breath  Cardiovascular: Positive for chest pain  Musculoskeletal: Positive for arthralgias  Physical Exam  Physical Exam  Constitutional:       General: He is not in acute distress  HENT:      Head: Normocephalic and atraumatic  Mouth/Throat:      Mouth: Mucous membranes are moist    Eyes:      Conjunctiva/sclera: Conjunctivae normal    Cardiovascular:      Rate and Rhythm: Normal rate and regular rhythm  Heart sounds: Normal heart sounds     Pulmonary: Effort: Pulmonary effort is normal       Breath sounds: Normal breath sounds  Abdominal:      Palpations: Abdomen is soft  Musculoskeletal:         General: Normal range of motion  Cervical back: Normal range of motion  Comments: Bruising and track marks to left forearm with normal left hand motor and soft forearm compartments, normal range of motion of all joints of arms and hands  Skin:     General: Skin is warm and dry  Neurological:      General: No focal deficit present  Mental Status: He is alert and oriented to person, place, and time     Psychiatric:         Mood and Affect: Mood normal          Vital Signs  ED Triage Vitals   Temperature Pulse Respirations Blood Pressure SpO2   03/06/23 2153 03/06/23 2151 03/06/23 2151 03/06/23 2151 03/06/23 2151   97 5 °F (36 4 °C) 99 20 110/87 100 %      Temp Source Heart Rate Source Patient Position - Orthostatic VS BP Location FiO2 (%)   03/06/23 2153 03/06/23 2151 03/06/23 2151 03/06/23 2151 --   Oral Monitor Lying Right arm       Pain Score       --                  Vitals:    03/06/23 2151   BP: 110/87   Pulse: 99   Patient Position - Orthostatic VS: Lying         Visual Acuity      ED Medications  Medications - No data to display    Diagnostic Studies  Results Reviewed     Procedure Component Value Units Date/Time    HS Troponin 0hr (reflex protocol) [[id number]]  (Normal) Collected: 03/06/23 2207    Lab Status: Final result Specimen: Blood from Arm, Right Updated: 03/06/23 2234     hs TnI 0hr <2 ng/L     Basic metabolic panel [[id number]] Collected: 03/06/23 2207    Lab Status: Final result Specimen: Blood from Arm, Right Updated: 03/06/23 2229     Sodium 136 mmol/L      Potassium 3 5 mmol/L      Chloride 101 mmol/L      CO2 29 mmol/L      ANION GAP 6 mmol/L      BUN 11 mg/dL      Creatinine 0 71 mg/dL      Glucose 88 mg/dL      Calcium 9 7 mg/dL      eGFR 123 ml/min/1 73sq m     Narrative:      [place] guidelines for Chronic Kidney Disease (CKD):   •  Stage 1 with normal or high GFR (GFR > 90 mL/min/1 73 square meters)  •  Stage 2 Mild CKD (GFR = 60-89 mL/min/1 73 square meters)  •  Stage 3A Moderate CKD (GFR = 45-59 mL/min/1 73 square meters)  •  Stage 3B Moderate CKD (GFR = 30-44 mL/min/1 73 square meters)  •  Stage 4 Severe CKD (GFR = 15-29 mL/min/1 73 square meters)  •  Stage 5 End Stage CKD (GFR <15 mL/min/1 73 square meters)  Note: GFR calculation is accurate only with a steady state creatinine    CBC and differential [[id number]]  (Abnormal) Collected: 03/06/23 2207    Lab Status: Final result Specimen: Blood from Arm, Right Updated: 03/06/23 2211     WBC 6 37 Thousand/uL      RBC 5 63 Million/uL      Hemoglobin 15 9 g/dL      Hematocrit 46 8 %      MCV 83 fL      MCH 28 2 pg      MCHC 34 0 g/dL      RDW 12 9 %      MPV 9 6 fL      Platelets 220 Thousands/uL      nRBC 0 /100 WBCs      Neutrophils Relative 60 %      Immat GRANS % 0 %      Lymphocytes Relative 30 %      Monocytes Relative 8 %      Eosinophils Relative 1 %      Basophils Relative 1 %      Neutrophils Absolute 3 85 Thousands/µL      Immature Grans Absolute 0 01 Thousand/uL      Lymphocytes Absolute 1 89 Thousands/µL      Monocytes Absolute 0 52 Thousand/µL      Eosinophils Absolute 0 07 Thousand/µL      Basophils Absolute 0 03 Thousands/µL                  XR chest 1 view portable    (Results Pending)              Procedures  Procedures         ED Course       27-year-old male presenting with chest pain and left arm pain after injecting meth  On arrival patient has normal vital signs  He is calm and cooperative  Very minimal psychomotor agitation  Initial EKG shows normal sinus rhythm 95 bpm with no acute ST or T wave abnormalities and normal intervals  Chest x-ray shows no acute disease  Screening labs obtained notable for initial troponin of less than 2 with normal electrolytes and CBC    Patient was advised that we would recommend a 2-hour troponin due to recent onset of his symptoms however at the time of reassessment he states that his symptoms have completely resolved and he would prefer to leave  Return precautions discussed  SBIRT 20yo+    Flowsheet Row Most Recent Value   SBIRT (23 yo +)    In order to provide better care to our patients, we are screening all of our patients for alcohol and drug use  Would it be okay to ask you these screening questions? No Filed at: 03/06/2023 2200                    MDM    Disposition  Final diagnoses:   Methamphetamine abuse ([address] )     Time reflects when diagnosis was documented in both MDM as applicable and the Disposition within this note     Time User Action Codes Description Comment    3/6/2023 11:29 PM Inocentejessica Never Add [F15 10] Methamphetamine abuse [facility])       ED Disposition     ED Disposition   Discharge    Condition   Stable    Date/Time   Mon Mar 6, 2023 Lei Mayers discharge to home/self care  Follow-up Information    None         Discharge Medication List as of 3/6/2023 11:29 PM      CONTINUE these medications which have NOT CHANGED    Details   acyclovir (Zovirax) 800 mg tablet Take 1 tablet (800 mg total) by mouth 2 (two) times a day for 10 days, Starting Tue 4/19/2022, Until Fri 4/29/2022, Normal      diphenhydrAMINE HCl (ELIAN-SELTZER PLUS ALLERGY PO) Take by mouth, Historical Med      fluticasone (FLONASE) 50 mcg/act nasal spray 1 spray into each nostril daily, Historical Med      oxymetazoline (AFRIN) 0 05 % nasal spray 2 sprays by Each Nare route 2 (two) times a day, Starting Tue 1/18/2022, Normal      varenicline (CHANTIX) 1 mg tablet TAKE ONE-HALF TABLET BY MOUTH ONCE DAILY FOR 3 DAYS, THEN TAKE ONE-HALF TABLET TWICE DAILY FOR 4 DAYS, THEN TAKE ONE TABLET TWICE DAILY, Normal             No discharge procedures on file      PDMP Review     None          ED Provider  Electronically Signed by           Elizabeth Hope MD  03/07/23 7492

## 2023-04-04 ENCOUNTER — APPOINTMENT (EMERGENCY)
Dept: RADIOLOGY | Facility: HOSPITAL | Age: 33
End: 2023-04-04

## 2023-04-04 ENCOUNTER — HOSPITAL ENCOUNTER (EMERGENCY)
Facility: HOSPITAL | Age: 33
Discharge: HOME | End: 2023-04-04
Attending: EMERGENCY MEDICINE

## 2023-04-04 VITALS
TEMPERATURE: 97.8 F | OXYGEN SATURATION: 98 % | DIASTOLIC BLOOD PRESSURE: 85 MMHG | RESPIRATION RATE: 20 BRPM | SYSTOLIC BLOOD PRESSURE: 138 MMHG | HEART RATE: 102 BPM

## 2023-04-04 DIAGNOSIS — R06.02 SHORTNESS OF BREATH: ICD-10-CM

## 2023-04-04 DIAGNOSIS — F41.9 ANXIETY: ICD-10-CM

## 2023-04-04 DIAGNOSIS — R07.9 CHEST PAIN: Primary | ICD-10-CM

## 2023-04-04 LAB
ANION GAP SERPL CALCULATED.3IONS-SCNC: 4 MMOL/L (ref 4–13)
ATRIAL RATE: 96 BPM
BASOPHILS # BLD AUTO: 0.05 THOUSANDS/ÂΜL (ref 0–0.1)
BASOPHILS NFR BLD AUTO: 1 % (ref 0–1)
BUN SERPL-MCNC: 12 MG/DL (ref 5–25)
CALCIUM SERPL-MCNC: 9.4 MG/DL (ref 8.3–10.1)
CARDIAC TROPONIN I PNL SERPL HS: <2 NG/L
CHLORIDE SERPL-SCNC: 104 MMOL/L (ref 96–108)
CO2 SERPL-SCNC: 30 MMOL/L (ref 21–32)
CREAT SERPL-MCNC: 0.73 MG/DL (ref 0.6–1.3)
EOSINOPHIL # BLD AUTO: 0.09 THOUSAND/ÂΜL (ref 0–0.61)
EOSINOPHIL NFR BLD AUTO: 1 % (ref 0–6)
ERYTHROCYTE [DISTWIDTH] IN BLOOD BY AUTOMATED COUNT: 12.6 % (ref 11.6–15.1)
GFR SERPL CREATININE-BSD FRML MDRD: 121 ML/MIN/1.73SQ M
GLUCOSE SERPL-MCNC: 87 MG/DL (ref 65–140)
HCT VFR BLD AUTO: 43.3 % (ref 36.5–49.3)
HGB BLD-MCNC: 14.9 G/DL (ref 12–17)
IMM GRANULOCYTES # BLD AUTO: 0.01 THOUSAND/UL (ref 0–0.2)
IMM GRANULOCYTES NFR BLD AUTO: 0 % (ref 0–2)
LYMPHOCYTES # BLD AUTO: 2.7 THOUSANDS/ÂΜL (ref 0.6–4.47)
LYMPHOCYTES NFR BLD AUTO: 40 % (ref 14–44)
MCH RBC QN AUTO: 28.8 PG (ref 26.8–34.3)
MCHC RBC AUTO-ENTMCNC: 34.4 G/DL (ref 31.4–37.4)
MCV RBC AUTO: 84 FL (ref 82–98)
MONOCYTES # BLD AUTO: 0.58 THOUSAND/ÂΜL (ref 0.17–1.22)
MONOCYTES NFR BLD AUTO: 9 % (ref 4–12)
NEUTROPHILS # BLD AUTO: 3.41 THOUSANDS/ÂΜL (ref 1.85–7.62)
NEUTS SEG NFR BLD AUTO: 49 % (ref 43–75)
NRBC BLD AUTO-RTO: 0 /100 WBCS
P AXIS: 76 DEGREES
PLATELET # BLD AUTO: 249 THOUSANDS/UL (ref 149–390)
PMV BLD AUTO: 9.1 FL (ref 8.9–12.7)
POTASSIUM SERPL-SCNC: 3.7 MMOL/L (ref 3.5–5.3)
PR INTERVAL: 124 MS
QRS AXIS: 84 DEGREES
QRSD INTERVAL: 88 MS
QT INTERVAL: 340 MS
QTC INTERVAL: 429 MS
RBC # BLD AUTO: 5.18 MILLION/UL (ref 3.88–5.62)
SODIUM SERPL-SCNC: 138 MMOL/L (ref 135–147)
T WAVE AXIS: 35 DEGREES
VENTRICULAR RATE: 96 BPM
WBC # BLD AUTO: 6.84 THOUSAND/UL (ref 4.31–10.16)

## 2023-04-04 RX ADMIN — SODIUM CHLORIDE 1000 ML: 0.9 INJECTION, SOLUTION INTRAVENOUS at 01:51

## 2023-04-04 NOTE — ED ATTENDING ATTESTATION
4/4/2023  ISy MD, saw and evaluated the patient  I have discussed the patient with the resident/non-physician practitioner and agree with the resident's/non-physician practitioner's findings, Plan of Care, and MDM as documented in the resident's/non-physician practitioner's note, except where noted  All available labs and Radiology studies were reviewed  I was present for key portions of any procedure(s) performed by the resident/non-physician practitioner and I was immediately available to provide assistance  At this point I agree with the current assessment done in the Emergency Department  I have conducted an independent evaluation of this patient a history and physical is as follows:    ED Course  ED Course as of 04/04/23 0145   Tue Apr 04, 2023   0127 Per resident h&p 36 YO M  presents for chest pain and dyspnea; injected methamphetamine 24 hours O: M in nad; NL exam no evidence endocarditis I/P ECG; Tn     Emergency Department Note- Gaston Hankins 35 y o  male MRN: [id number]    Unit/Bed#: ED 13 Encounter: [id number]    Gaston Hankins is a 35 y o  male who presents with   Chief Complaint   Patient presents with   • Chest Pain     Pt reports chest pain that started around 2000  States he went to bed and woke up gasping for air  Reports CP now is mild  Also states he is very easily winded  History of Present Illness   HPI:  Gaston Hankins is a 35 y o  male who presents for evaluation of:  Central chest discomfort that the patient describes as a pulling sensation that radiates to his back into his shoulders and started about 8 hours ago  Currently, in the ED he is asymptomatic  The chest discomfort is completely resolved  He is concerned because his friend injected some methamphetamine into his arm yesterday and he feels like the methamphetamine may have provoked the chest symptoms  He has no history of cardiovascular disease or pulmonary disease    He uses methamphetamine typically by smoking every 3 to 4 days  He denies any associated palpitations  He has no history of venous thromboembolism, cardiac disease, or pulmonary disease  Review of Systems   Constitutional: Negative for fatigue and fever  HENT: Negative for congestion and sore throat  Respiratory: Negative for cough and shortness of breath  Cardiovascular: Positive for chest pain  Negative for palpitations and leg swelling  Gastrointestinal: Negative for abdominal pain and nausea  Genitourinary: Negative for flank pain and frequency  Neurological: Negative for light-headedness and headaches  Psychiatric/Behavioral: Negative for dysphoric mood and hallucinations  All other systems reviewed and are negative  Historical Information   Past Medical History:   Diagnosis Date   • Asperger's syndrome    • GERD (gastroesophageal reflux disease)    • Rectal prolapse    • Rectal prolapse 5/9/2018     Past Surgical History:   Procedure Laterality Date   • HERNIA REPAIR     • IN COLONOSCOPY FLX DX W/COLLJ SPEC WHEN PFRMD N/A 5/7/2018    Procedure: COLONOSCOPY;  Surgeon: Elsie Stringer MD;  [facility]; Service: Colorectal   • IN LAPAROSCOPY PROCTOPEXY PROLAPSE SIGMOID [facility] N/A 5/8/2018    Procedure: LAPAROSCOPIC HAND-ASSIST REPAIR OF RECTAL PROLAPSE BY SACRAL  Vera ;  Surgeon: Elsie Stringer MD;  [facility];  Service: Colorectal   • IN SIGMOIDOSCOPY FLX DX W/COLLJ SPEC BR/WA IF PFRMD N/A 6/19/2018    Procedure: Iantha Dakins;  Surgeon: Elsie Stringer MD;  [facility]: [facility];   Service: Colorectal   • SURGERY SCROTAL / TESTICULAR       Social History   Social History     Substance and Sexual Activity   Alcohol Use No     Social History     Substance and Sexual Activity   Drug Use Yes   • Types: Methamphetamines    Comment: Daily user -- would like to quit 2-28-23     Social History     Tobacco Use   Smoking Status Every Day   • Packs/day: 1 00   • Types: Cigarettes   Smokeless Tobacco Never     Family History:   Family History   Family history unknown: Yes       Meds/Allergies   PTA meds:   Prior to Admission Medications   Prescriptions Last Dose Informant Patient Reported? Taking?   acyclovir (Zovirax) 800 mg tablet   No No   Sig: Take 1 tablet (800 mg total) by mouth 2 (two) times a day for 10 days   Patient not taking: Reported on 2022   diphenhydrAMINE HCl (ELIAN-SELTZER PLUS ALLERGY PO)   Yes No   Sig: Take by mouth   Patient not taking: Reported on 2022   fluticasone (FLONASE) 50 mcg/act nasal spray   Yes No   Si spray into each nostril daily   Patient not taking: Reported on 2021   oxymetazoline (AFRIN) 0 05 % nasal spray   No No   Si sprays by Each Nare route 2 (two) times a day   Patient not taking: Reported on 2022   varenicline (CHANTIX) 1 mg tablet   No No   Sig: TAKE ONE-HALF TABLET BY MOUTH ONCE DAILY FOR 3 DAYS, THEN TAKE ONE-HALF TABLET TWICE DAILY FOR 4 DAYS, THEN TAKE ONE TABLET TWICE DAILY   Patient not taking: Reported on 2022      Facility-Administered Medications: None     No Known Allergies    Objective   First Vitals:   Blood Pressure: 143/93 (23)  Pulse: (!) 110 (23)  Temperature: 97 8 °F (36 6 °C) (23)  Temp Source: Oral (23)  Respirations: 20 (23)  SpO2: 99 % (23)    Current Vitals:   Blood Pressure: 138/85 (23)  Pulse: 102 (23)  Temperature: 97 8 °F (36 6 °C) (23)  Temp Source: Oral (23)  Respirations: 20 (23)  SpO2: 98 % (23)    No intake or output data in the 24 hours ending 23    Invasive Devices     None                 Physical Exam  Vitals and nursing note reviewed  Constitutional:       General: He is not in acute distress  Appearance: Normal appearance  He is well-developed  HENT:      Head: Normocephalic and atraumatic        Right Ear: "External ear normal       Left Ear: External ear normal       Nose: Nose normal       Mouth/Throat:      Pharynx: No oropharyngeal exudate  Eyes:      Conjunctiva/sclera: Conjunctivae normal       Pupils: Pupils are equal, round, and reactive to light  Cardiovascular:      Rate and Rhythm: Normal rate and regular rhythm  Pulmonary:      Effort: Pulmonary effort is normal  No respiratory distress  Abdominal:      General: Abdomen is flat  There is no distension  Palpations: Abdomen is soft  Musculoskeletal:         General: No deformity  Normal range of motion  Cervical back: Normal range of motion and neck supple  Skin:     General: Skin is warm and dry  Capillary Refill: Capillary refill takes less than 2 seconds  Neurological:      General: No focal deficit present  Mental Status: He is alert and oriented to person, place, and time  Mental status is at baseline  Coordination: Coordination normal    Psychiatric:         Mood and Affect: Mood normal          Behavior: Behavior normal          Thought Content: Thought content normal          Judgment: Judgment normal            Medical Decision Makin  Acute chest discomfort: Now resolved; ECG rule out pericarditis  Troponin rule out MI  Recent Results (from the past 36 hour(s))   ECG 12 lead    Collection Time: 23  1:04 AM   Result Value Ref Range    Ventricular Rate 96 BPM    Atrial Rate 96 BPM    WV Interval 124 ms    QRSD Interval 88 ms    QT Interval 340 ms    QTC Interval 429 ms    P Axis 76 degrees    QRS Axis 84 degrees    T Wave Axis 35 degrees     XR chest 2 views    (Results Pending)         Portions of the record may have been created with voice recognition software  Occasional wrong word or \"sound a like\" substitutions may have occurred due to the inherent limitations of voice recognition software    Read the chart carefully and recognize, using context, where substitutions have " occurred            Critical Care Time  Procedures

## 2023-04-04 NOTE — ED PROVIDER NOTES
History  Chief Complaint   Patient presents with   • Chest Pain     Pt reports chest pain that started around   States he went to bed and woke up gasping for air  Reports CP now is mild  Also states he is very easily winded  Patient is 27-year-old male presenting for cute onset chest pain and shortness of breath that began at approximately 8:00 tonight  Past medical history significant for IV methamphetamine use, autism spectrum disorder  No cardiac history  Patient states symptoms began after he woke up from sleeping  Have since improved  Patient has never experienced a similar episode before  Patient states last time he used IV methamphetamines was approximately 24 hours prior to the episode  Patient denies any fever/chills, nausea/vomit/diarrhea, abdominal pain, cough, congestion  Denies any radiation of the chest pain, diaphoresis, syncope, lightheadedness, dizziness  Patient denies any recent travel, recent surgeries, history of blood clots/DVTs  Prior to Admission Medications   Prescriptions Last Dose Informant Patient Reported?  Taking?   acyclovir (Zovirax) 800 mg tablet   No No   Sig: Take 1 tablet (800 mg total) by mouth 2 (two) times a day for 10 days   Patient not taking: Reported on 2022   diphenhydrAMINE HCl (ELIAN-SELTZER PLUS ALLERGY PO)   Yes No   Sig: Take by mouth   Patient not taking: Reported on 2022   fluticasone (FLONASE) 50 mcg/act nasal spray   Yes No   Si spray into each nostril daily   Patient not taking: Reported on 2021   oxymetazoline (AFRIN) 0 05 % nasal spray   No No   Si sprays by Each Nare route 2 (two) times a day   Patient not taking: Reported on 2022   varenicline (CHANTIX) 1 mg tablet   No No   Sig: TAKE ONE-HALF TABLET BY MOUTH ONCE DAILY FOR 3 DAYS, THEN TAKE ONE-HALF TABLET TWICE DAILY FOR 4 DAYS, THEN TAKE ONE TABLET TWICE DAILY   Patient not taking: Reported on 2022      Facility-Administered Medications: None Past Medical History:   Diagnosis Date   • Asperger's syndrome    • GERD (gastroesophageal reflux disease)    • Rectal prolapse    • Rectal prolapse 5/9/2018       Past Surgical History:   Procedure Laterality Date   • HERNIA REPAIR     • [place] COLONOSCOPY FLX DX W/COLLJ SPEC WHEN PFRMD N/A 5/7/2018    Procedure: COLONOSCOPY;  Surgeon: Destiny Graves MD;  [facility]; Service: Colorectal   • [place] LAPAROSCOPY PROCTOPEXY PROLAPSE SIGMOID [facility] N/A 5/8/2018    Procedure: LAPAROSCOPIC HAND-ASSIST REPAIR OF RECTAL PROLAPSE BY SACRAL  Vishnu Zaid;  Surgeon: Destiny Graves MD;  [facility];  Service: Colorectal   • [place] SIGMOIDOSCOPY FLX DX W/COLLJ SPEC BR/WA IF PFRMD N/A 6/19/2018    Procedure: Beata Wiggins;  Surgeon: Destiny Graves MD;  [facility]; Service: Colorectal   • SURGERY SCROTAL / TESTICULAR         Family History   Family history unknown: Yes     I have reviewed and agree with the history as documented  E-Cigarette/Vaping   • E-Cigarette Use Never User      E-Cigarette/Vaping Substances   • Nicotine No    • THC No    • CBD No    • Flavoring No    • Other No    • Unknown No      Social History     Tobacco Use   • Smoking status: Every Day     Packs/day: 1 00     Types: Cigarettes   • Smokeless tobacco: Never   Vaping Use   • Vaping Use: Never used   Substance Use Topics   • Alcohol use: No   • Drug use: Yes     Types: Methamphetamines     Comment: Daily user -- would like to quit 2-28-23        Review of Systems   Constitutional: Negative  HENT: Negative  Respiratory: Positive for shortness of breath  Cardiovascular: Positive for chest pain  Gastrointestinal: Negative  Endocrine: Negative  Genitourinary: Negative  Musculoskeletal: Negative  Skin: Negative  Allergic/Immunologic: Negative  Neurological: Negative  Hematological: Negative  Psychiatric/Behavioral: Negative          Physical Exam  ED Triage Vitals [04/04/23 0103] Temperature Pulse Respirations Blood Pressure SpO2   97 8 °F (36 6 °C) (!) 110 20 143/93 99 %      Temp Source Heart Rate Source Patient Position - Orthostatic VS BP Location FiO2 (%)   Oral Monitor Lying Left arm --      Pain Score       5             Orthostatic Vital Signs  Vitals:    04/04/23 0103 04/04/23 0115   BP: 143/93 138/85   Pulse: (!) 110 102   Patient Position - Orthostatic VS: Lying Lying       Physical Exam  Vitals and nursing note reviewed  Constitutional:       Appearance: He is well-developed and normal weight  HENT:      Head: Normocephalic and atraumatic  Eyes:      Extraocular Movements: Extraocular movements intact  Pupils: Pupils are equal, round, and reactive to light  Cardiovascular:      Rate and Rhythm: Normal rate and regular rhythm  Pulses:           Carotid pulses are 2+ on the right side and 2+ on the left side  Radial pulses are 2+ on the right side and 2+ on the left side  Posterior tibial pulses are 2+ on the right side and 2+ on the left side  Heart sounds: Normal heart sounds  No murmur heard  Pulmonary:      Effort: Pulmonary effort is normal       Breath sounds: Normal breath sounds  Abdominal:      General: Bowel sounds are normal       Palpations: Abdomen is soft  Musculoskeletal:         General: Normal range of motion  Cervical back: Normal range of motion and neck supple  Skin:     General: Skin is warm and dry  Capillary Refill: Capillary refill takes less than 2 seconds  Comments: Patient has no signs of carditis: No Janeway lesions, no splinter hemorrhages, no Osler nodes  Patient does have injection marks on both forearms  No signs of abscess formation, pus, drainage, erythema  Neurological:      General: No focal deficit present  Mental Status: He is alert and oriented to person, place, and time     Psychiatric:         Mood and Affect: Mood normal          Behavior: Behavior normal          ED Medications  Medications   sodium chloride 0 9 % bolus 1,000 mL (0 mL Intravenous Stopped 4/4/23 0251)       Diagnostic Studies  Results Reviewed     Procedure Component Value Units Date/Time    HS Troponin 0hr (reflex protocol) [[id number]]  (Normal) Collected: 04/04/23 0150    Lab Status: Final result Specimen: Blood from Arm, Left Updated: 04/04/23 0250     hs TnI 0hr <2 ng/L     Basic metabolic panel [[id number]] Collected: 04/04/23 0150    Lab Status: Final result Specimen: Blood from Arm, Left Updated: 04/04/23 0222     Sodium 138 mmol/L      Potassium 3 7 mmol/L      Chloride 104 mmol/L      CO2 30 mmol/L      ANION GAP 4 mmol/L      BUN 12 mg/dL      Creatinine 0 73 mg/dL      Glucose 87 mg/dL      Calcium 9 4 mg/dL      eGFR 121 ml/min/1 73sq m     Narrative:      [place] guidelines for Chronic Kidney Disease (CKD):   •  Stage 1 with normal or high GFR (GFR > 90 mL/min/1 73 square meters)  •  Stage 2 Mild CKD (GFR = 60-89 mL/min/1 73 square meters)  •  Stage 3A Moderate CKD (GFR = 45-59 mL/min/1 73 square meters)  •  Stage 3B Moderate CKD (GFR = 30-44 mL/min/1 73 square meters)  •  Stage 4 Severe CKD (GFR = 15-29 mL/min/1 73 square meters)  •  Stage 5 End Stage CKD (GFR <15 mL/min/1 73 square meters)  Note: GFR calculation is accurate only with a steady state creatinine    CBC and differential [[id number]] Collected: 04/04/23 0150    Lab Status: Final result Specimen: Blood from Arm, Left Updated: 04/04/23 0158     WBC 6 84 Thousand/uL      RBC 5 18 Million/uL      Hemoglobin 14 9 g/dL      Hematocrit 43 3 %      MCV 84 fL      MCH 28 8 pg      MCHC 34 4 g/dL      RDW 12 6 %      MPV 9 1 fL      Platelets 945 Thousands/uL      nRBC 0 /100 WBCs      Neutrophils Relative 49 %      Immat GRANS % 0 %      Lymphocytes Relative 40 %      Monocytes Relative 9 %      Eosinophils Relative 1 %      Basophils Relative 1 %      Neutrophils Absolute 3 41 Thousands/µL      Immature Grans Absolute 0 01 Thousand/uL      Lymphocytes Absolute 2 70 Thousands/µL      Monocytes Absolute 0 58 Thousand/µL      Eosinophils Absolute 0 09 Thousand/µL      Basophils Absolute 0 05 Thousands/µL                  XR chest 2 views   ED Interpretation by Emeterio Morataya MD (04/04 9164)   No acute cardiopulmonary disease  Procedures  ECG 12 Lead Documentation Only    Date/Time: 4/4/2023 3:20 AM  Performed by: Emeterio Morataya MD  Authorized by: Emeterio Morataya MD     Indications / Diagnosis:  Chest pain  Patient location:  ED  Interpretation:     Interpretation: normal    Rate:     ECG rate assessment: tachycardic    Rhythm:     Rhythm: sinus rhythm    Ectopy:     Ectopy: none    QRS:     QRS axis:  Normal    QRS intervals:  Normal  Conduction:     Conduction: normal    ST segments:     ST segments:  Normal  T waves:     T waves: normal            ED Course             HEART Risk Score    Flowsheet Row Most Recent Value   Heart Score Risk Calculator    History 1 Filed at: 04/04/2023 0257   ECG 1 Filed at: 04/04/2023 0257   Age 0 Filed at: 04/04/2023 0257   Risk Factors 1 Filed at: 04/04/2023 0257   Troponin 0 Filed at: 04/04/2023 0257   HEART Score 3 Filed at: 04/04/2023 0257                                Medical Decision Making  Patient is 51-year-old male presenting for acute onset chest pain shortness of breath  DDx: Anxiety, substance abuse, ACS, arrhythmia  Based on patient presentation physical exam findings, perform full cardiac work-up to evaluate for ACS, arrhythmia  Unlikely to be substance abuse his last use was over 24 hours ago  If lab work negative for any acute findings and patient symptoms continue to improve in the ER, most likely symptoms are due to anxiety  We will plan for discharge if no acute findings on lab work or chest x-ray        Anxiety: acute illness or injury  Chest pain: acute illness or injury  Shortness of breath: acute illness or injury  Amount and/or Complexity of Data Reviewed  Labs: ordered  Radiology: ordered and independent interpretation performed  Disposition  Final diagnoses:   Chest pain   Shortness of breath   Anxiety     Time reflects when diagnosis was documented in both MDM as applicable and the Disposition within this note     Time User Action Codes Description Comment    4/4/2023  2:58 AM Zamora Nj Add [R07 9] Chest pain     4/4/2023  3:00 AM Zamora Nj Add [R06 02] Shortness of breath     4/4/2023  3:00 AM Zamora Nj Add [F41 9] Anxiety       ED Disposition     ED Disposition   Discharge    Condition   Stable    Date/Time   Tue Apr 4, 2023  2:58 AM    Comment   Lisa Lyn discharge to home/self care                 Follow-up Information     Follow up With Specialties Details Why Contact Info Additional [address] Emergency Department Emergency Medicine Go to  If symptoms worsen [address] Emergency Department, [address] [place] [address],[address], DO Family Medicine Go to  If symptoms worsen [address]  [address]   [phone]             Discharge Medication List as of 4/4/2023  2:59 AM      CONTINUE these medications which have NOT CHANGED    Details   acyclovir (Zovirax) 800 mg tablet Take 1 tablet (800 mg total) by mouth 2 (two) times a day for 10 days, Starting Tue 4/19/2022, Until Fri 4/29/2022, Normal      diphenhydrAMINE HCl (ELIAN-SELTZER PLUS ALLERGY PO) Take by mouth, Historical Med      fluticasone (FLONASE) 50 mcg/act nasal spray 1 spray into each nostril daily, Historical Med      oxymetazoline (AFRIN) 0 05 % nasal spray 2 sprays by Each Nare route 2 (two) times a day, Starting Tue 1/18/2022, Normal      varenicline (CHANTIX) 1 mg tablet TAKE ONE-HALF TABLET BY MOUTH ONCE DAILY FOR 3 DAYS, THEN TAKE ONE-HALF TABLET TWICE DAILY FOR 4 DAYS, THEN TAKE ONE TABLET TWICE DAILY, Normal           No discharge procedures on file  PDMP Review     None           ED Provider  Attending physically available and evaluated Miriam Villanueva I managed the patient along with the ED Attending      Electronically Signed by         Shirley Buck MD  04/04/23 2683

## 2023-09-07 ENCOUNTER — HOSPITAL ENCOUNTER (EMERGENCY)
Facility: HOSPITAL | Age: 33
End: 2023-09-08
Attending: EMERGENCY MEDICINE
Payer: MEDICARE

## 2023-09-07 ENCOUNTER — HOSPITAL ENCOUNTER (EMERGENCY)
Facility: HOSPITAL | Age: 33
Discharge: HOME | End: 2023-09-07
Attending: EMERGENCY MEDICINE
Payer: MEDICARE

## 2023-09-07 VITALS
OXYGEN SATURATION: 94 % | SYSTOLIC BLOOD PRESSURE: 120 MMHG | TEMPERATURE: 97.6 F | HEART RATE: 116 BPM | DIASTOLIC BLOOD PRESSURE: 82 MMHG | RESPIRATION RATE: 18 BRPM

## 2023-09-07 VITALS
DIASTOLIC BLOOD PRESSURE: 72 MMHG | RESPIRATION RATE: 18 BRPM | SYSTOLIC BLOOD PRESSURE: 131 MMHG | TEMPERATURE: 97.6 F | OXYGEN SATURATION: 100 % | HEART RATE: 101 BPM

## 2023-09-07 DIAGNOSIS — Z00.8 ENCOUNTER FOR PSYCHOLOGICAL EVALUATION: ICD-10-CM

## 2023-09-07 DIAGNOSIS — F91.9 DESTRUCTIVE BEHAVIOR: ICD-10-CM

## 2023-09-07 DIAGNOSIS — Z00.8 ENCOUNTER FOR PSYCHOLOGICAL EVALUATION: Primary | ICD-10-CM

## 2023-09-07 DIAGNOSIS — F15.10 METHAMPHETAMINE ABUSE (HCC): Primary | ICD-10-CM

## 2023-09-07 LAB
AMPHETAMINES SERPL QL SCN: POSITIVE
BARBITURATES UR QL: NEGATIVE
BENZODIAZ UR QL: NEGATIVE
COCAINE UR QL: NEGATIVE
ETHANOL EXG-MCNC: 0 MG/DL
OPIATES UR QL SCN: NEGATIVE
OXYCODONE+OXYMORPHONE UR QL SCN: NEGATIVE
PCP UR QL: NEGATIVE
THC UR QL: NEGATIVE

## 2023-09-07 PROCEDURE — 80307 DRUG TEST PRSMV CHEM ANLYZR: CPT | Performed by: EMERGENCY MEDICINE

## 2023-09-07 PROCEDURE — 82075 ASSAY OF BREATH ETHANOL: CPT | Performed by: EMERGENCY MEDICINE

## 2023-09-07 PROCEDURE — 99285 EMERGENCY DEPT VISIT HI MDM: CPT | Performed by: EMERGENCY MEDICINE

## 2023-09-07 PROCEDURE — 99283 EMERGENCY DEPT VISIT LOW MDM: CPT

## 2023-09-07 PROCEDURE — 99284 EMERGENCY DEPT VISIT MOD MDM: CPT | Performed by: EMERGENCY MEDICINE

## 2023-09-07 PROCEDURE — 99285 EMERGENCY DEPT VISIT HI MDM: CPT

## 2023-09-07 RX ORDER — NICOTINE 21 MG/24HR
14 PATCH, TRANSDERMAL 24 HOURS TRANSDERMAL ONCE
Status: DISCONTINUED | OUTPATIENT
Start: 2023-09-07 | End: 2023-09-08 | Stop reason: HOSPADM

## 2023-09-07 RX ORDER — LORAZEPAM 1 MG/1
1 TABLET ORAL ONCE
Status: COMPLETED | OUTPATIENT
Start: 2023-09-07 | End: 2023-09-07

## 2023-09-07 RX ADMIN — LORAZEPAM 1 MG: 1 TABLET ORAL at 22:19

## 2023-09-07 RX ADMIN — NICOTINE 14 MG: 14 PATCH, EXTENDED RELEASE TRANSDERMAL at 22:19

## 2023-09-07 NOTE — ED PROVIDER NOTES
History  Chief Complaint   Patient presents with   • Psychiatric Evaluation     Pt arrives from home with [place] [employer]     36 y/o male with hx of Asperger syndrome and substance abuse presents to the ED via [employer] police from home for psychiatric evaluation. The patient has a history of methamphetamine abuse and states that he last used meth approximately 3 hours ago. Per police report, 086 was called twice tonight by the patient's mother, first after he used meth and tried to use her car to drive to a friend's house, and then again after she had driven into his friend's house, picked him up, and they were in an argument where the patient stated to his mother that " I wish you would die."  The patient states that he was angry during the argument but had no intentions of harming his mother. He denies any SI, HI, or hallucinations. He states that he has been using methamphetamine off-and-on over the last year, increasing in frequency over the last month and a half. He states that he is potentially interested in seeking substance abuse rehab resources. He reports feeling jittery and anxious but otherwise no physical symptoms or complaints. Prior to Admission Medications   Prescriptions Last Dose Informant Patient Reported?  Taking?   acyclovir (Zovirax) 800 mg tablet   No No   Sig: Take 1 tablet (800 mg total) by mouth 2 (two) times a day for 10 days   Patient not taking: Reported on 2022   diphenhydrAMINE HCl (ELIAN-SELTZER PLUS ALLERGY PO)   Yes No   Sig: Take by mouth   Patient not taking: Reported on 2022   fluticasone (FLONASE) 50 mcg/act nasal spray   Yes No   Si spray into each nostril daily   Patient not taking: Reported on 2021   oxymetazoline (AFRIN) 0.05 % nasal spray   No No   Si sprays by Each Nare route 2 (two) times a day   Patient not taking: Reported on 2022   varenicline (CHANTIX) 1 mg tablet   No No   Sig: TAKE ONE-HALF TABLET BY MOUTH ONCE DAILY FOR 3 DAYS, THEN TAKE ONE-HALF TABLET TWICE DAILY FOR 4 DAYS, THEN TAKE ONE TABLET TWICE DAILY   Patient not taking: Reported on 11/5/2022      Facility-Administered Medications: None       Past Medical History:   Diagnosis Date   • Asperger's syndrome    • GERD (gastroesophageal reflux disease)    • Rectal prolapse    • Rectal prolapse 5/9/2018       Past Surgical History:   Procedure Laterality Date   • HERNIA REPAIR     • [place] COLONOSCOPY FLX DX W/COLLJ SPEC WHEN PFRMD N/A 5/7/2018    Procedure: COLONOSCOPY;  Surgeon: Nidia Ahumada, MD;  [facility]; Service: Colorectal   • GA LAPAROSCOPY PROCTOPEXY PROLAPSE SIGMOID [address] N/A 5/8/2018    Procedure: LAPAROSCOPIC HAND-ASSIST REPAIR OF RECTAL PROLAPSE BY BEBETO Wynne;  Surgeon: Nidia Ahumada, MD;  [facility];  Service: Colorectal   • GA SIGMOIDOSCOPY FLX DX W/COLLJ SPEC BR/WA IF PFRMD N/A 6/19/2018    Procedure: Rupert Carney;  Surgeon: Nidia Ahumada, MD;  [facility]; Service: Colorectal   • SURGERY SCROTAL / TESTICULAR         Family History   Family history unknown: Yes     I have reviewed and agree with the history as documented. E-Cigarette/Vaping   • E-Cigarette Use Never User      E-Cigarette/Vaping Substances   • Nicotine No    • THC No    • CBD No    • Flavoring No    • Other No    • Unknown No      Social History     Tobacco Use   • Smoking status: Every Day     Packs/day: 1.00     Types: Cigarettes   • Smokeless tobacco: Never   Vaping Use   • Vaping Use: Never used   Substance Use Topics   • Alcohol use: No   • Drug use: Yes     Types: Methamphetamines     Comment: Daily user -- would like to quit 2-28-23       Review of Systems   Constitutional: Negative for chills and fever. HENT: Negative for congestion, rhinorrhea and sore throat. Respiratory: Negative for cough and shortness of breath. Cardiovascular: Negative for chest pain and palpitations.    Gastrointestinal: Negative for abdominal pain, diarrhea, nausea and vomiting. Genitourinary: Negative for dysuria and hematuria. Musculoskeletal: Negative for back pain and neck pain. Neurological: Negative for weakness, light-headedness, numbness and headaches. Psychiatric/Behavioral: Negative for hallucinations, self-injury and suicidal ideas. The patient is nervous/anxious. All other systems reviewed and are negative. Physical Exam  Physical Exam  Vitals and nursing note reviewed. Constitutional:       General: He is not in acute distress. Appearance: Normal appearance. He is normal weight. HENT:      Head: Normocephalic and atraumatic. Right Ear: External ear normal.      Left Ear: External ear normal.      Nose: Nose normal.      Mouth/Throat:      Mouth: Mucous membranes are moist.      Pharynx: Oropharynx is clear. No oropharyngeal exudate or posterior oropharyngeal erythema. Eyes:      Extraocular Movements: Extraocular movements intact. Conjunctiva/sclera: Conjunctivae normal.      Pupils: Pupils are equal, round, and reactive to light. Cardiovascular:      Rate and Rhythm: Normal rate and regular rhythm. Pulses: Normal pulses. Heart sounds: Normal heart sounds. Pulmonary:      Effort: Pulmonary effort is normal. No respiratory distress. Breath sounds: Normal breath sounds. No wheezing or rales. Abdominal:      General: Abdomen is flat. Bowel sounds are normal. There is no distension. Palpations: Abdomen is soft. Tenderness: There is no abdominal tenderness. There is no right CVA tenderness, left CVA tenderness or guarding. Musculoskeletal:         General: No swelling or tenderness. Normal range of motion. Cervical back: Normal range of motion and neck supple. No tenderness. Skin:     General: Skin is warm and dry. Neurological:      General: No focal deficit present. Mental Status: He is alert and oriented to person, place, and time.    Psychiatric:      Comments: Appears anxious, psychomotor agitation, consistent with recent methamphetamine use. No SI, HI, or hallucinations. Vital Signs  ED Triage Vitals   Temperature Pulse Respirations Blood Pressure SpO2   09/07/23 0118 09/07/23 0112 09/07/23 0112 09/07/23 0112 09/07/23 0112   97.6 °F (36.4 °C) (!) 116 18 120/82 94 %      Temp Source Heart Rate Source Patient Position - Orthostatic VS BP Location FiO2 (%)   09/07/23 0118 09/07/23 0112 09/07/23 0112 09/07/23 0112 --   Oral Monitor Sitting Right arm       Pain Score       09/07/23 0112       No Pain           Vitals:    09/07/23 0112   BP: 120/82   Pulse: (!) 116   Patient Position - Orthostatic VS: Sitting         Visual Acuity      ED Medications  Medications - No data to display    Diagnostic Studies  Results Reviewed     None                 No orders to display              Procedures  Procedures         ED Course                                             Medical Decision Making  36 y/o male with hx of Asperger syndrome and substance abuse presents to the ED via [place] police from home for psychiatric evaluation. The patient has a history of methamphetamine abuse and states that he last used meth approximately 3 hours ago. Per police report, 754 was called twice tonight by the patient's mother, first after he used meth and tried to use her car to drive to a friend's house, and then again after she had driven into his friend's house, picked him up, and they were in an argument where the patient stated to his mother that " I wish you would die."  The patient states that he was angry during the argument but had no intentions of harming his mother. He denies any SI, HI, or hallucinations. He states that he has been using methamphetamine off-and-on over the last year, increasing in frequency over the last month and a half. He states that he is potentially interested in seeking substance abuse rehab resources.   He reports feeling jittery and anxious but otherwise no physical symptoms or complaints. Vital signs reviewed. Tachycardia noted, suspect due to recent amphetamine use, vitals otherwise stable. Appears anxious, psychomotor agitation, consistent with recent methamphetamine use. No SI, HI, or hallucinations. See physical exam documentation for full exam findings. The patient initially told police that he would like to be seen at the hospital for resources to help stop meth use. Initial plan was to observe the patient and facilitate warm handoff for meth rehab resources. The patient was given food and juice, which he tolerated without difficulty. I offered the patient Ativan to help with his jitteriness after meth use, and also offered a nicotine patch, however he declined both. Subsequently, the patient states that he felt like he was forced to come to the hospital by police (although police state that he requested to come the hospital and he is not under arrest or 302 petition). Patient wishes to be discharged. The patient is able to ambulate with a steady gait, clear speech, signs of recent meth use but otherwise does not appear clinically intoxicated. I discussed all findings, treatment, red flags/return precautions, and outpatient follow-up and the patient/family understand and agree. Stable for discharge. Disposition  Final diagnoses:   Methamphetamine abuse ([address])   Encounter for psychological evaluation     Time reflects when diagnosis was documented in both MDM as applicable and the Disposition within this note     Time User Action Codes Description Comment    9/7/2023  1:26 AM Harmony Nieves [F15.10] Methamphetamine abuse ([address])     9/7/2023  1:26 AM Harmony Nieves [Z00.8] Encounter for psychological evaluation       ED Disposition     ED Disposition   Discharge    Condition   Stable    Date/Time   Thu Sep 7, 2023  1:40 AM    Comment   Kellie Teague discharge to home/self care.                Follow-up Information Follow up With Specialties Details Why Contact Info Additional Information    Damian Gutierrez,  Family Medicine Call  As needed 1 Medical [facility] Chandrika  [facility] [address] Emergency Department Emergency Medicine Go to  If symptoms worsen [address] [phone] [address] [facility] Emergency Department, [address] [place] [address], [address]          Discharge Medication List as of 9/7/2023  1:40 AM      CONTINUE these medications which have NOT CHANGED    Details   acyclovir (Zovirax) 800 mg tablet Take 1 tablet (800 mg total) by mouth 2 (two) times a day for 10 days, Starting Tue 4/19/2022, Until Fri 4/29/2022, Normal      diphenhydrAMINE HCl (ELIAN-SELTZER PLUS ALLERGY PO) Take by mouth, Historical Med      fluticasone (FLONASE) 50 mcg/act nasal spray 1 spray into each nostril daily, Historical Med      oxymetazoline (AFRIN) 0.05 % nasal spray 2 sprays by Each Nare route 2 (two) times a day, Starting Tue 1/18/2022, Normal      varenicline (CHANTIX) 1 mg tablet TAKE ONE-HALF TABLET BY MOUTH ONCE DAILY FOR 3 DAYS, THEN TAKE ONE-HALF TABLET TWICE DAILY FOR 4 DAYS, THEN TAKE ONE TABLET TWICE DAILY, Normal             No discharge procedures on file.     PDMP Review     None          ED Provider  Electronically Signed by           Reina Cheadle, MD  09/07/23 0145

## 2023-09-07 NOTE — ED NOTES
Patient walked out of room states he wants to leave, nurse encouraged patient to stay, explained were here to help if patient is seeking detox as he admitted to wanting detox during triage. Patient declined and asked to leave. Pt left without waiting for discharge paper work.      Landon Bella RN  09/07/23 7609
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
## 2023-09-08 NOTE — ED NOTES
Patient is accepted at [facility]  Patient is accepted by Dr. Shannan Blandon per [facility] [place] is arranged with [facility]  Transportation is scheduled for 0800 9/8  Patient may go to the floor at anytime

## 2023-09-08 NOTE — ED NOTES
Insurance Authorization for admission:  Pt has Medicare [address]   Phone call placed to [facility]  Phone number [phone]  Per message if  pt has preauthorization with another insurance no preauth is needed. Office is closed     Spoke to **.   ** days approved. Level of care **   Review on **. Authorization # **.
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
## 2023-09-08 NOTE — ED NOTES
Attempted to call report. Number listed [phone] is a fax machine.      Lyubov Mayfield RN  09/08/23 1813

## 2023-09-08 NOTE — EMTALA/ACUTE CARE TRANSFER
Formerly [facility] EMERGENCY DEPARTMENT  [address] [facility] [address]  Dept: [phone]      EMTALA TRANSFER CONSENT    NAME Adithya Gutierrez                                         1990                              MRN [id number]    I have been informed of my rights regarding examination, treatment, and transfer   by Dr. Berta Styles MD    Benefits: Specialized equipment and/or services available at the receiving facility (Include comment)________________________    Risks: Increased discomfort during transfer      Consent for Transfer:  I acknowledge that my medical condition has been evaluated and explained to me by the emergency department physician or other qualified medical person and/or my attending physician, who has recommended that I be transferred to the service of  Accepting Physician: Dr. Dunia Parsons at [address] [place] [address] [address] Name, [address] : [facility], [address]. The above potential benefits of such transfer, the potential risks associated with such transfer, and the probable risks of not being transferred have been explained to me, and I fully understand them. The doctor has explained that, in my case, the benefits of transfer outweigh the risks. I agree to be transferred. I authorize the performance of emergency medical procedures and treatments upon me in both transit and upon arrival at the receiving facility. Additionally, I authorize the release of any and all medical records to the receiving facility and request they be transported with me, if possible. I understand that the safest mode of transportation during a medical emergency is an ambulance and that the Hospital advocates the use of this mode of transport. Risks of traveling to the receiving facility by car, including absence of medical control, life sustaining equipment, such as oxygen, and medical personnel has been explained to me and I fully understand them.     ([address])  [  ] I consent to the stated transfer and to be transported by ambulance/helicopter. [  ]  I consent to the stated transfer, but refuse transportation by ambulance and accept full responsibility for my transportation by car. I understand the risks of non-ambulance transfers and I exonerate the Hospital and its staff from any deterioration in my condition that results from this refusal.    X___________________________________________    DATE  23  TIME________  Signature of patient or legally responsible individual signing on patient behalf           RELATIONSHIP TO PATIENT_________________________          Provider Certification    NAME Kellie Teague                                         1990                              MRN [id number]    A medical screening exam was performed on the above named patient. Based on the examination:    Condition Necessitating Transfer The primary encounter diagnosis was Encounter for psychological evaluation. A diagnosis of Destructive behavior was also pertinent to this visit.     Patient Condition: The patient has been stabilized such that within reasonable medical probability, no material deterioration of the patient condition or the condition of the unborn child(danita) is likely to result from the transfer    Reason for Transfer: Level of Care needed not available at this facility    Transfer Requirements: [address] [place] [address]   · Space available and qualified personnel available for treatment as acknowledged by Wyatt Colmenares  · Agreed to accept transfer and to provide appropriate medical treatment as acknowledged by       Dr. Piotr Terry  · Appropriate medical records of the examination and treatment of the patient are provided at the time of transfer   [address] _______  · Transfer will be performed by qualified personnel from    and appropriate transfer equipment as required, including the use of necessary and appropriate life support measures. Provider Certification: I have examined the patient and explained the following risks and benefits of being transferred/refusing transfer to the patient/family:         Based on these reasonable risks and benefits to the patient and/or the unborn child(danita), and based upon the information available at the time of the patient’s examination, I certify that the medical benefits reasonably to be expected from the provision of appropriate medical treatments at another medical facility outweigh the increasing risks, if any, to the individual’s medical condition, and in the case of labor to the unborn child, from effecting the transfer.     X____________________________________________ DATE 09/08/23        TIME_______      ORIGINAL - SEND TO MEDICAL RECORDS   COPY - SEND WITH PATIENT DURING TRANSFER

## 2023-09-08 NOTE — ED NOTES
Chart reviewed. Patient arrived on 36 but signed 12. Accepted at [facility], [email or web address]. Placed a call to [facility] to verify ETA. Spoke with Adonay/Toney in admissions. They were aware of ETA. They will complete any necessary pre cert/COB. [facility] would appreciate a nurse to nurse report at [phone]. clinical printed and included in transfer packet. Patient signed EMTALA, was calm, cooperative. [facility] reviewed the 302. Rights section was not completed. Page 7, physician's examination was missing the times of arrival and examination, findings and treatment needed were completed. Faxed the 302/NSMD and signed 201 to [facility].

## 2023-09-08 NOTE — ED CARE HANDOFF
Emergency Department Sign Out Note        Sign out and transfer of care from Dr. Estiven Cm. See Separate Emergency Department note. The patient, Castillo Chun, was evaluated by the previous provider for psychiatric evaluation. 201 in place with backup 302 for aggressive behavior. Workup Completed:  Labs Reviewed   RAPID DRUG SCREEN, URINE - Abnormal       Result Value Ref Range Status    Amph/Meth UR Positive (*) Negative Final    Barbiturate Ur Negative  Negative Final    Benzodiazepine Urine Negative  Negative Final    Cocaine Urine Negative  Negative Final    Methadone Urine     Final    Opiate Urine Negative  Negative Final    PCP Ur Negative  Negative Final    THC Urine Negative  Negative Final    Oxycodone Urine Negative  Negative Final    Narrative:     Methadone test not performed; call laboratory if required  [address] IF CONFIRMATION NEEDED PLEASE CONTACT THE LAB WITHIN 5 DAYS. Drug Screen Cutoff Levels:  AMPHETAMINE/METHAMPHETAMINES  1000 ng/mL  BARBITURATES     200 ng/mL  BENZODIAZEPINES     200 ng/mL  COCAINE      300 ng/mL  METHADONE      300 ng/mL  OPIATES      300 ng/mL  PHENCYCLIDINE     25 ng/mL  THC       50 ng/mL  OXYCODONE      100 ng/mL   POCT ALCOHOL BREATH TEST - Normal    EXTBreath Alcohol 0.000   Final       ED Course / Workup Pending (followup):      ED Course as of 09/08/23 0844   Fri Sep 08, 2023   0659 SO: accepted to [facility]. 201 in place with backup 302. Aggressive behavior at home. Procedures  Medical Decision Making  59-year-old male previously evaluated for agitation and aggressive behavior at home. Signed out to me pending transport to [facility]. Patient with a 201 in place with backup 302 if needed. Patient remained stable in the emergency department with no acute issues. Transported in stable condition.     Destructive behavior: acute illness or injury  Encounter for psychological evaluation: acute illness or injury  Amount and/or Complexity of Data Reviewed  Labs: ordered. Risk  OTC drugs. Prescription drug management. Decision regarding hospitalization. Disposition  Final diagnoses:   Encounter for psychological evaluation   Destructive behavior     Time reflects when diagnosis was documented in both MDM as applicable and the Disposition within this note     Time User Action Codes Description Comment    9/8/2023  6:17 AM Татьяна Grande Add [Z00.8] Encounter for psychological evaluation     9/8/2023  6:18 AM Chris Cordova Add [F91.9] Destructive behavior       ED Disposition     ED Disposition   Transfer to [address] [place] [address]   --    Date/Time   Fri Sep 8, 2023  6:18 AM    Comment   Donny Ball should be transferred out to [facility] and has been medically cleared.            MD Documentation    Flowsheet Row Most Recent Value   Patient Condition The patient has been stabilized such that within reasonable medical probability, no material deterioration of the patient condition or the condition of the unborn child(danita) is likely to result from the transfer   Reason for Transfer Level of Care needed not available at this facility   Benefits of Transfer Specialized equipment and/or services available at the receiving facility (Include comment)________________________   Risks of Transfer Increased discomfort during transfer   Accepting Physician Dr. Janet Cisneros Name, [address] [place] [address]    (Name & Tel number) Sylvia England   Sending MD Chris Cordova      RN Documentation    [address] Name, [address] [place] [address]    (Name & Tel number) Sylvia England      Follow-up Information    None       Discharge Medication List as of 9/8/2023  8:15 AM      CONTINUE these medications which have NOT CHANGED    Details   acyclovir (Zovirax) 800 mg tablet Take 1 tablet (800 mg total) by mouth 2 (two) times a day for 10 days, Starting Tue 4/19/2022, Until Fri 4/29/2022, Normal      diphenhydrAMINE HCl (ELIAN-SELTZER PLUS ALLERGY PO) Take by mouth, Historical Med      fluticasone (FLONASE) 50 mcg/act nasal spray 1 spray into each nostril daily, Historical Med      oxymetazoline (AFRIN) 0.05 % nasal spray 2 sprays by Each Nare route 2 (two) times a day, Starting Tue 1/18/2022, Normal      varenicline (CHANTIX) 1 mg tablet TAKE ONE-HALF TABLET BY MOUTH ONCE DAILY FOR 3 DAYS, THEN TAKE ONE-HALF TABLET TWICE DAILY FOR 4 DAYS, THEN TAKE ONE TABLET TWICE DAILY, Normal           No discharge procedures on file.        ED Provider  Electronically Signed by     Asha Bhatti MD  09/08/23 5845

## 2023-09-08 NOTE — ED NOTES
Pt provided a pillow, warm blanket and TV remote along with a snack & apple juice.       Sergio Boothe  09/07/23 2127

## 2023-09-08 NOTE — ED NOTES
Bed search for DUAL bed:  Pt did not want [facility] Miranda Beckett has beds will review (201 and chart faxed)  Joann Frankel has beds will review (201 and chart faxed)  Sofie De La Paz has beds will review (201 and chart faxed)  [facility]- p/c did not go through

## 2023-10-02 ENCOUNTER — OFFICE VISIT (OUTPATIENT)
Dept: FAMILY MEDICINE CLINIC | Facility: CLINIC | Age: 33
End: 2023-10-02
Payer: MEDICARE

## 2023-10-02 VITALS
TEMPERATURE: 99.7 F | WEIGHT: 150.6 LBS | SYSTOLIC BLOOD PRESSURE: 104 MMHG | OXYGEN SATURATION: 97 % | DIASTOLIC BLOOD PRESSURE: 66 MMHG | BODY MASS INDEX: 19.33 KG/M2 | HEART RATE: 88 BPM | HEIGHT: 74 IN

## 2023-10-02 DIAGNOSIS — F33.9 DEPRESSION, RECURRENT (HCC): ICD-10-CM

## 2023-10-02 DIAGNOSIS — Z23 FLU VACCINE NEED: ICD-10-CM

## 2023-10-02 DIAGNOSIS — F19.10 DRUG ABUSE (HCC): ICD-10-CM

## 2023-10-02 DIAGNOSIS — Z11.59 ENCOUNTER FOR SCREENING FOR OTHER VIRAL DISEASES: ICD-10-CM

## 2023-10-02 DIAGNOSIS — Z79.899 ENCOUNTER FOR LONG-TERM (CURRENT) USE OF MEDICATIONS: ICD-10-CM

## 2023-10-02 DIAGNOSIS — Z79.899 MEDICATION MANAGEMENT: ICD-10-CM

## 2023-10-02 DIAGNOSIS — K76.9 LIVER DISEASE, UNSPECIFIED: ICD-10-CM

## 2023-10-02 DIAGNOSIS — Z71.2 ENCOUNTER TO DISCUSS TEST RESULTS: ICD-10-CM

## 2023-10-02 DIAGNOSIS — Z00.00 MEDICARE ANNUAL WELLNESS VISIT, SUBSEQUENT: Primary | ICD-10-CM

## 2023-10-02 DIAGNOSIS — R53.83 FATIGUE, UNSPECIFIED TYPE: ICD-10-CM

## 2023-10-02 DIAGNOSIS — E55.9 VITAMIN D INSUFFICIENCY: ICD-10-CM

## 2023-10-02 DIAGNOSIS — F19.20 DRUG ABUSE AND DEPENDENCE (HCC): ICD-10-CM

## 2023-10-02 PROCEDURE — 90686 IIV4 VACC NO PRSV 0.5 ML IM: CPT | Performed by: FAMILY MEDICINE

## 2023-10-02 PROCEDURE — G0008 ADMIN INFLUENZA VIRUS VAC: HCPCS | Performed by: FAMILY MEDICINE

## 2023-10-02 PROCEDURE — G0439 PPPS, SUBSEQ VISIT: HCPCS | Performed by: FAMILY MEDICINE

## 2023-10-02 PROCEDURE — 99214 OFFICE O/P EST MOD 30 MIN: CPT | Performed by: FAMILY MEDICINE

## 2023-10-02 RX ORDER — BUSPIRONE HYDROCHLORIDE 10 MG/1
1 TABLET ORAL 3 TIMES DAILY
COMMUNITY
Start: 2023-09-13

## 2023-10-02 NOTE — PATIENT INSTRUCTIONS
Medicare Preventive Visit Patient Instructions  Thank you for completing your Welcome to Medicare Visit or Medicare Annual Wellness Visit today. Your next wellness visit will be due in one year (10/2/2024). The screening/preventive services that you may require over the next 5-10 years are detailed below. Some tests may not apply to you based off risk factors and/or age. Screening tests ordered at today's visit but not completed yet may show as past due. Also, please note that scanned in results may not display below. Preventive Screenings:  Service Recommendations Previous Testing/Comments   Colorectal Cancer Screening  · Colonoscopy    · Fecal Occult Blood Test (FOBT)/Fecal Immunochemical Test (FIT)  · Fecal DNA/Cologuard Test  · Flexible Sigmoidoscopy Age: 43-73 years old   Colonoscopy: every 10 years (May be performed more frequently if at higher risk)  OR  FOBT/FIT: every 1 year  OR  Cologuard: every 3 years  OR  Sigmoidoscopy: every 5 years  Screening may be recommended earlier than age 39 if at higher risk for colorectal cancer. Also, an individualized decision between you and your healthcare provider will decide whether screening between the ages of 77-80 would be appropriate.  Colonoscopy: 05/24/2019  FOBT/FIT: Not on file  Cologuard: Not on file  Sigmoidoscopy: 06/19/2018          Prostate Cancer Screening Individualized decision between patient and health care provider in men between ages of 53-66   Medicare will cover every 12 months beginning on the day after your 50th birthday PSA: No results in last 5 years     Screening Not Indicated     Hepatitis C Screening Once for adults born between 1945 and 1965  More frequently in patients at high risk for Hepatitis C Hep C Antibody: 04/15/2022    Screening Current   Diabetes Screening 1-2 times per year if you're at risk for diabetes or have pre-diabetes Fasting glucose: 91 mg/dL (4/15/2022)  A1C: No results in last 5 years (No results in last 5 years)  Screening Current   Cholesterol Screening Once every 5 years if you don't have a lipid disorder. May order more often based on risk factors. Lipid panel: 04/15/2022  Screening Not Indicated  History Lipid Disorder      Other Preventive Screenings Covered by Medicare:  1. Abdominal Aortic Aneurysm (AAA) Screening: covered once if your at risk. You're considered to be at risk if you have a family history of AAA or a male between the age of 70-76 who smoking at least 100 cigarettes in your lifetime. 2. Lung Cancer Screening: covers low dose CT scan once per year if you meet all of the following conditions: (1) Age 48-67; (2) No signs or symptoms of lung cancer; (3) Current smoker or have quit smoking within the last 15 years; (4) You have a tobacco smoking history of at least 20 pack years (packs per day x number of years you smoked); (5) You get a written order from a healthcare provider. 3. Glaucoma Screening: covered annually if you're considered high risk: (1) You have diabetes OR (2) Family history of glaucoma OR (3)  aged 48 and older OR (3)  American aged 72 and older  3. Osteoporosis Screening: covered every 2 years if you meet one of the following conditions: (1) Have a vertebral abnormality; (2) On glucocorticoid therapy for more than 3 months; (3) Have primary hyperparathyroidism; (4) On osteoporosis medications and need to assess response to drug therapy. 5. HIV Screening: covered annually if you're between the age of 14-79. Also covered annually if you are younger than 13 and older than 72 with risk factors for HIV infection. For pregnant patients, it is covered up to 3 times per pregnancy.     Immunizations:  Immunization Recommendations   Influenza Vaccine Annual influenza vaccination during flu season is recommended for all persons aged >= 6 months who do not have contraindications   Pneumococcal Vaccine   * Pneumococcal conjugate vaccine = PCV13 (Prevnar 13), PCV15 (Vaxneuvance), PCV20 (Prevnar 20)  * Pneumococcal polysaccharide vaccine = PPSV23 (Pneumovax) Adults 2364 years old: 1-3 doses may be recommended based on certain risk factors  Adults 72 years old: 1-2 doses may be recommended based off what pneumonia vaccine you previously received   Hepatitis B Vaccine 3 dose series if at intermediate or high risk (ex: diabetes, end stage renal disease, liver disease)   Tetanus (Td) Vaccine - COST NOT COVERED BY MEDICARE PART B Following completion of primary series, a booster dose should be given every 10 years to maintain immunity against tetanus. Td may also be given as tetanus wound prophylaxis. Tdap Vaccine - COST NOT COVERED BY MEDICARE PART B Recommended at least once for all adults. For pregnant patients, recommended with each pregnancy. Shingles Vaccine (Shingrix) - COST NOT COVERED BY MEDICARE PART B  2 shot series recommended in those aged 48 and above     Health Maintenance Due:      Topic Date Due   • Colorectal Cancer Screening  05/24/2020   • HIV Screening  Completed   • Hepatitis C Screening  Completed     Immunizations Due:      Topic Date Due   • COVID-19 Vaccine (1) Never done   • Hepatitis A Vaccine (1 of 2 - Risk 2-dose series) Never done   • Pneumococcal Vaccine: Pediatrics (0 to 5 Years) and At-Risk Patients (6 to 59 Years) (2 - PCV) 04/06/2022   • Influenza Vaccine (1) 09/01/2023     Advance Directives   What are advance directives? Advance directives are legal documents that state your wishes and plans for medical care. These plans are made ahead of time in case you lose your ability to make decisions for yourself. Advance directives can apply to any medical decision, such as the treatments you want, and if you want to donate organs. What are the types of advance directives? There are many types of advance directives, and each state has rules about how to use them. You may choose a combination of any of the following:  · Living will:   This is a written record of the treatment you want. You can also choose which treatments you do not want, which to limit, and which to stop at a certain time. This includes surgery, medicine, IV fluid, and tube feedings. · Durable power of  for [facility]): This is a written record that states who you want to make healthcare choices for you when you are unable to make them for yourself. This person, called a proxy, is usually a family member or a friend. You may choose more than 1 proxy. · Do not resuscitate (DNR) order:  A DNR order is used in case your heart stops beating or you stop breathing. It is a request not to have certain forms of treatment, such as CPR. A DNR order may be included in other types of advance directives. · Medical directive: This covers the care that you want if you are in a coma, near death, or unable to make decisions for yourself. You can list the treatments you want for each condition. Treatment may include pain medicine, surgery, blood transfusions, dialysis, IV or tube feedings, and a ventilator (breathing machine). · Values history: This document has questions about your views, beliefs, and how you feel and think about life. This information can help others choose the care that you would choose. Why are advance directives important? An advance directive helps you control your care. Although spoken wishes may be used, it is better to have your wishes written down. Spoken wishes can be misunderstood, or not followed. Treatments may be given even if you do not want them. An advance directive may make it easier for your family to make difficult choices about your care. Depression   Depression  is a medical condition that causes feelings of sadness or hopelessness that do not go away. Depression may cause you to lose interest in things you used to enjoy. These feelings may interfere with your daily life.   Call your local emergency number (911 in the [address]) if:   · You think about harming yourself or someone else. · You have done something on purpose to hurt yourself. The following resources are available at any time to help you, if needed:   · [place]: 4-877.317.7468 (1-891-739-TZGZ)   · Suicide Hotline: 2-645.604.3118 (9-837-FGZUBLG)   · For a list of international numbers: https://save.org/find-help/international-resources/  Treatment for depression may include medicine to relieve depression. Medicine is often used together with therapy. Therapy is a way for you to talk about your feelings and anything that may be causing depression. Therapy can be done alone or in a group. It may also be done with family members or a significant other. · Get regular physical activity. · Create a regular sleep schedule. · Eat a variety of healthy foods. · Do not drink alcohol or use drugs. Cigarette Smoking and Your Health   Risks to your health if you smoke:  Nicotine and other chemicals found in tobacco damage every cell in your body. Even if you are a light smoker, you have an increased risk for cancer, heart disease, and lung disease. If you are pregnant or have diabetes, smoking increases your risk for complications. Benefits to your health if you stop smoking:   · You decrease respiratory symptoms such as coughing, wheezing, and shortness of breath. · You reduce your risk for cancers of the lung, mouth, throat, kidney, bladder, pancreas, stomach, and cervix. If you already have cancer, you increase the benefits of chemotherapy. You also reduce your risk for cancer returning or a second cancer from developing. · You reduce your risk for heart disease, blood clots, heart attack, and stroke. · You reduce your risk for lung infections, and diseases such as pneumonia, asthma, chronic bronchitis, and emphysema. · Your circulation improves. More oxygen can be delivered to your body.  If you have diabetes, you lower your risk for complications, such as kidney, artery, and eye diseases. You also lower your risk for nerve damage. Nerve damage can lead to amputations, poor vision, and blindness. · You improve your body's ability to heal and to fight infections. For more information and support to stop smoking:   · Smokefree. gov  Phone: 6- 982 - 564-2764  Web Address: www.[employer]. [employer] [address] [place] [address] 2018 Information is for End User's use only and may not be sold, redistributed or otherwise used for commercial purposes.  All illustrations and images included in CareNotes® are the copyrighted property of [employer].D.A.M., Inc. or  [address]

## 2023-10-02 NOTE — PROGRESS NOTES
Assessment/Plan:       1. Medicare annual wellness visit, subsequent    2. Drug abuse and dependence [facility])  Assessment & Plan:  The patient is now off methamphetamine and clean for 2 weeks. He is very proud of that. Orders:  -     CBC; Future  -     UA w Reflex to Microscopic w Reflex to Culture  -     Comprehensive metabolic panel; Future  -     Lipid panel; Future  -     TSH, 3rd generation; Future  -     Vitamin D 25 hydroxy; Future  -     Albumin / creatinine urine ratio  -     Hepatitis C antibody; Future  -     HIV 1/2 AB/AG w Reflex [facility] for 2 yr old and above; Future  -     Chronic Hepatitis Panel; Future    3. Encounter for long-term (current) use of medications  Assessment & Plan:  He takes only BuSpar 10 mg three times a day. 4. Medication management  Assessment & Plan: BuSpar 10 mg three times a day. He takes it as needed. Discussed that he may take his in the morning, noon, and night. Assured him that he may not take it within 6 or 8 hours intervals. 5. Encounter to discuss test results  Assessment & Plan:  All lab results reviewed with patient mostly from 04/2023. 6. Drug abuse [facility])  Assessment & Plan: On methamphetamine about 1 year but discontinued 2 weeks ago. Feels much better and the one good thing about it, it has taken his cerrato lifestyle out of the picture. No longer interested. Orders:  -     CBC; Future  -     UA w Reflex to Microscopic w Reflex to Culture  -     Comprehensive metabolic panel; Future  -     Lipid panel; Future  -     TSH, 3rd generation; Future  -     Vitamin D 25 hydroxy; Future  -     Albumin / creatinine urine ratio  -     Hepatitis C antibody; Future  -     HIV 1/2 AB/AG w Reflex [facility] for 2 yr old and above; Future  -     Chronic Hepatitis Panel; Future    7. Vitamin D insufficiency  -     Vitamin D 25 hydroxy; Future    8. Fatigue, unspecified type  -     CBC;  Future  -     UA w Reflex to Microscopic w Reflex to Culture  -     Comprehensive metabolic panel; Future  -     Lipid panel; Future  -     TSH, 3rd generation; Future  -     Vitamin D 25 hydroxy; Future  -     Albumin / creatinine urine ratio  -     Hepatitis C antibody; Future  -     HIV 1/2 AB/AG w Reflex [facility] for 2 yr old and above; Future  -     Chronic Hepatitis Panel; Future    9. Flu vaccine need  -     influenza vaccine, quadrivalent, 0.5 mL, preservative-free, for adult and pediatric patients 6 mos+ (AFLURIA, FLUARIX, FLULAVAL, FLUZONE)    10. Depression, recurrent ([address])  Assessment & Plan:  Seems better now that he is off methamphetamine. 11. Liver disease, unspecified  -     Lipid panel; Future    12. Encounter for screening for other viral diseases  -     Chronic Hepatitis Panel; Future      Liver disease unspecified. We will check HIV and hepatitis panel both B and C now. Screening for other viral diseases. Pimple in his pubic region. Recommended he takes a washcloth that he does not use anywhere else on his body, and soak it in hot soapy water, hold it on for 10 minutes. Recommended he does it 4 to 6 times a day. Future lab works. We will order a list of blood tests that he may undergo in his own free time. Discussed that he may take it at the [facility].  Discussed that he should have an empty stomach and that he only drinks water, and he takes his BuSpar prior to undergoing the tests. Health overview  The patient is now 2 weeks free of methamphetamine. He states this has ended his desire to be homosexual. He thinks that is a good thing. We talked at length about the ravages of drug use and abuse. His only medication at present time is BuSpar 10 mg three times a day but he hardly ever takes it three times a day. A healthy lifestyle was stressed, eating three meals a day, eating whole some food, fresh fruits and vegetables.   He is not doing any drugs that are not prescribed by a doctor, etc.  He desires to end smoking but right now it is helping him with the withdrawal of methamphetamine so, he is not quick to end it. Preventive health issues were discussed with patient, and age appropriate screening tests were ordered as noted in patient's After Visit Summary. Personalized health advice and appropriate referrals for health education or preventive services given if needed, as noted in patient's After Visit Summary. Tobacco Cessation Counseling: Tobacco cessation counseling was provided. The patient is sincerely urged to quit consumption of tobacco. He is not ready to quit tobacco. Tobacco use screening or tobacco cessation counseling was not performed due to other medical reasons. Subjective:      Patient ID: Donny Ball is a 35 y.o. male. Patient presents for a Medicare Wellness Visit. Diane Muñoz is a 24-year-old male who is well-known to me for many years presents for follow up and evaluation of the following medical issues: The patient has been drug-free from methamphetamine for a period of 2 weeks. He had attempted taking Chantix which worked for him, but he decided to discontinue it due to smoking cigarettes help him cope from his methamphetamine withdrawals. He still smokes approximately 10 cigarettes a day. He expresses his desire to quit smoking eventually. He states that he has slightly gained weight, as he reported that he was 130 pounds about 4 weeks ago. He associates his weight gain as an effect of misusing his body. He reports of having shared needles in the past.    He reports of waking up with pain in his knees due to laying for an extended period. He inquiries of having an influenza vaccination. He inquiries about having a kidney scan due to his family medical history. He expresses his realization that after he discontinued methamphetamine, he states that it had canceled out his cerrato lifestyle.     He reports of a pimple in his pubic region and inquiries advice for treatment options. The patient takes BuSpar 1 tablet three times a day, but he states that he is inconsistent to his medication intake. He does not take any other medication. He tries to take his vitamins every day. He had taken a chewable vitamin before presenting in the office today. Medicare wellness questions. He can walk and transfer in and out of bed. He can dress himself, bathe, and shower. He can feed himself. He can do his own shopping and take care of his own meals. He cannot manage his money and pay his bills. He cannot do his laundry. He wears a seatbelt when he is in a car. Social history. He has plans of getting a truck again, to which his mother feels nervous about it and told him to stay away from bad people. He still thinks about his mother every day. His father passed away due to kidney cancer approximately 10 years ago. Immunizations. Influenza vaccine 6 months to 59years old was given today on 10/02/2023. The following portions of the patient's history were reviewed and updated as appropriate:   He  has a past medical history of Asperger's syndrome, COPD (chronic obstructive pulmonary disease) ([address]), GERD (gastroesophageal reflux disease), Headache(784.0), Rectal prolapse, and Rectal prolapse (05/09/2018).   He   Patient Active Problem List    Diagnosis Date Noted   • Depression, recurrent ([address]) 10/02/2023   • Drug abuse and dependence ([address]) 10/02/2023   • Encounter for long-term (current) use of medications 10/02/2023   • Medication management 10/02/2023   • Encounter to discuss test results 10/02/2023   • Drug abuse ([address]) 10/02/2023   • Smoking 01/18/2022   • Nasal congestion 01/18/2022   • Autism spectrum 04/06/2021   • Environmental and seasonal allergies 11/10/2020   • Personal history of colonic polyps 03/19/2019   • Rectal bleed 06/19/2018   • Rectal prolapse 05/09/2018     He  has a past surgical history that includes Hernia repair; Surgery scrotal / testicular; pr colonoscopy flx dx w/collj spec when pfrmd (N/A, 5/7/2018); pr laparoscopy proctopexy prolapse sigmoid rescj (N/A, 5/8/2018); and pr sigmoidoscopy flx dx w/collj spec br/wa if pfrmd (N/A, 6/19/2018). His Family history is unknown by patient. He  reports that he has been smoking cigarettes. He has a 15.00 pack-year smoking history. He has never used smokeless tobacco. He reports current drug use. Drug: Methamphetamines. He reports that he does not drink alcohol. Current Outpatient Medications   Medication Sig Dispense Refill   • busPIRone (BUSPAR) 10 mg tablet Take 1 tablet by mouth 3 (three) times a day     • diphenhydrAMINE HCl (ELIAN-SELTZER PLUS ALLERGY PO) Take by mouth     • acyclovir (Zovirax) 800 mg tablet Take 1 tablet (800 mg total) by mouth 2 (two) times a day for 10 days (Patient not taking: Reported on 7/26/2022) 20 tablet 0     No current facility-administered medications for this visit. Current Outpatient Medications on File Prior to Visit   Medication Sig   • busPIRone (BUSPAR) 10 mg tablet Take 1 tablet by mouth 3 (three) times a day   • diphenhydrAMINE HCl (ELIAN-SELTZER PLUS ALLERGY PO) Take by mouth   • acyclovir (Zovirax) 800 mg tablet Take 1 tablet (800 mg total) by mouth 2 (two) times a day for 10 days (Patient not taking: Reported on 7/26/2022)     No current facility-administered medications on file prior to visit. He has No Known Allergies. Review of Systems   Constitutional: Negative for activity change, appetite change, chills, diaphoresis, fatigue, fever and unexpected weight change. HENT: Negative for congestion, dental problem, drooling, ear discharge, ear pain, facial swelling, mouth sores, nosebleeds, postnasal drip, rhinorrhea, trouble swallowing and voice change. Eyes: Negative for photophobia, pain, discharge, redness, itching and visual disturbance. Respiratory: Negative for apnea, cough, choking, chest tightness and shortness of breath.          Smoking about 10 cigaretts a day - claims he wants to stop. But not yet stop as it helps him with his withdrawal from that. Cardiovascular: Negative for chest pain and leg swelling. Gastrointestinal: Negative for abdominal distention, abdominal pain, constipation, diarrhea and nausea. Endocrine: Negative for polydipsia, polyphagia and polyuria. Genitourinary: Negative for decreased urine volume, difficulty urinating, dysuria, enuresis and hematuria. Father  of renal cancer - age 46   Musculoskeletal: Positive for arthralgias. Negative for back pain, gait problem and joint swelling. Skin: Negative for color change, pallor, rash and wound. Allergic/Immunologic: Negative for immunocompromised state. Neurological: Negative for dizziness, seizures, syncope, facial asymmetry, speech difficulty, light-headedness and headaches. Hematological: Negative for adenopathy. Psychiatric/Behavioral: Negative for agitation, behavioral problems, confusion and decreased concentration. Objective:  /66 (BP Location: Left arm, Patient Position: Sitting, Cuff Size: Standard)   Pulse 88   Temp 99.7 °F (37.6 °C) (Temporal)   Ht 6' 2" (1.88 m)   Wt 68.3 kg (150 lb 9.6 oz)   SpO2 97%   BMI 19.34 kg/m²          Physical Exam  Vitals and nursing note reviewed. Constitutional:       General: He is not in acute distress. Appearance: Normal appearance. He is well-developed and normal weight. He is not ill-appearing, toxic-appearing or diaphoretic. HENT:      Head: Normocephalic and atraumatic. Right Ear: Tympanic membrane, ear canal and external ear normal. No drainage, swelling or tenderness. Left Ear: Tympanic membrane, ear canal and external ear normal. No drainage, swelling or tenderness. Nose: Nose normal. No congestion or rhinorrhea. Mouth/Throat:      Mouth: Mucous membranes are moist. No oral lesions. Pharynx: Pharyngeal swelling (very enlarted tonsils.) present.  No oropharyngeal exudate or uvula swelling. Tonsils: Tonsillar exudate present. No tonsillar abscesses. 0 on the right. 0 on the left. Eyes:      Extraocular Movements: Extraocular movements intact. Conjunctiva/sclera: Conjunctivae normal.      Pupils: Pupils are equal, round, and reactive to light. Comments: Slight redness of the throat noted, with white exudate. Strep testing negative. Cardiovascular:      Rate and Rhythm: Normal rate and regular rhythm. Pulses: Normal pulses. Heart sounds: Normal heart sounds. No murmur heard. No friction rub. Pulmonary:      Effort: No respiratory distress. Breath sounds: Normal breath sounds. Abdominal:      General: Bowel sounds are normal.      Palpations: Abdomen is soft. Musculoskeletal:         General: Normal range of motion. Cervical back: Normal range of motion. Skin:     General: Skin is warm. Capillary Refill: Capillary refill takes less than 2 seconds. Neurological:      General: No focal deficit present. Mental Status: He is alert and oriented to person, place, and time. Psychiatric:         Mood and Affect: Mood normal.         Behavior: Behavior normal.         Thought Content: Thought content normal.         Judgment: Judgment normal.       Weight: 150 pounds, had lost 14 pounds from his weight in 07/2022 of 164.5 pounds. BMI: 19.34. Rapid urine drug test results in 09/2023:  Positive for methamphetamine. Other levels were reported to be normal.    Lab tests done on 04/12/2023:  Glucose levels are within normal range. GFR is 125 mL/min/1.73 m2  Potassium 3.9 mEq/L  Liver test results are within normal range. GGTP is 14, top normal is 64. I personally reviewed the recent (and prior)  lab results, the image studies, pathology, other specialty/physicians consult notes and recommendations, and outside medical records from other institutions, as appropriate.  I had a lengthy discussion with the patient and shared the work-up findings. We discussed the diagnosis and management plan. I spent  30  minutes reviewing the records (labs, clinician notes, outside records, medical history, ordering medicine/tests/procedures, interpreting the imaging/labs previously done) and coordination of care as well as direct time with the patient today, of which greater than 50% of the time was spent in counseling and coordination of care with the patient/family. Transcribed for Mely Patrick DO, by Paula Santos on 10/08/23 at 10:02 PM. Powered by [email or web address].

## 2023-10-02 NOTE — PROGRESS NOTES
Assessment and Plan:     Problem List Items Addressed This Visit    None      Tobacco Cessation Counseling: Tobacco cessation counseling was provided. The patient is sincerely urged to quit consumption of tobacco. He is not ready to quit tobacco. Tobacco use screening or tobacco cessation counseling was not performed due to other medical reasons. Preventive health issues were discussed with patient, and age appropriate screening tests were ordered as noted in patient's After Visit Summary. Personalized health advice and appropriate referrals for health education or preventive services given if needed, as noted in patient's After Visit Summary. History of Present Illness:     Patient presents for a Medicare Wellness Visit    HPI   Patient Care Team:  Kate Sánchez DO as PCP - General (Family Medicine)  Mariaa Moy MD (Colon and Rectal Surgery)  Mariaa Moy MD as Endoscopist     Review of Systems:     Review of Systems     Problem List:     Patient Active Problem List   Diagnosis   • Rectal prolapse   • Rectal bleed   • Personal history of colonic polyps   • Environmental and seasonal allergies   • Autism spectrum   • Smoking   • Nasal congestion      Past Medical and Surgical History:     Past Medical History:   Diagnosis Date   • Asperger's syndrome    • COPD (chronic obstructive pulmonary disease) ([address])    • GERD (gastroesophageal reflux disease)    • Headache(784.0)    • Rectal prolapse    • Rectal prolapse 05/09/2018     Past Surgical History:   Procedure Laterality Date   • HERNIA REPAIR     • [place] COLONOSCOPY FLX DX W/COLLJ SPEC WHEN PFRMD N/A 5/7/2018    Procedure: COLONOSCOPY;  Surgeon: Mariaa Moy MD;  [facility]: [facility];   Service: Colorectal   • [place] LAPAROSCOPY PROCTOPEXY PROLAPSE SIGMOID [address] N/A 5/8/2018    Procedure: LAPAROSCOPIC HAND-ASSIST REPAIR OF RECTAL PROLAPSE BY BEBETO Burrows;  Surgeon: Mariaa Moy MD;  [facility];  Service: Colorectal   • CT SIGMOIDOSCOPY FLX DX W/COLLJ SPEC BR/WA IF PFRMD N/A 2018    Procedure: Tracy Medico;  Surgeon: Mel Cabrera MD;  Location: [facility]; Service: Colorectal   • SURGERY SCROTAL / TESTICULAR        Family History:     Family History   Family history unknown: Yes      Social History:     Social History     Socioeconomic History   • Marital status: Single     Spouse name: None   • Number of children: None   • Years of education: None   • Highest education level: None   Occupational History   • None   Tobacco Use   • Smoking status: Every Day     Packs/day: 1.00     Years: 15.00     Total pack years: 15.00     Types: Cigarettes   • Smokeless tobacco: Never   Vaping Use   • Vaping Use: Never used   Substance and Sexual Activity   • Alcohol use: No   • Drug use: Yes     Types: Methamphetamines     Comment: Daily user -- would like to quit 23   • Sexual activity: Not Currently   Other Topics Concern   • None   Social History Narrative    · Most recent tobacco use screenin2019      Social Determinants of Health     Financial Resource Strain: Not on file   Food Insecurity: Not on file   Transportation Needs: Not on file   Physical Activity: Not on file   Stress: Not on file   Social Connections: Not on file   Intimate Partner Violence: Not on file   Housing Stability: Not on file      Medications and Allergies:     Current Outpatient Medications   Medication Sig Dispense Refill   • busPIRone (BUSPAR) 10 mg tablet Take 1 tablet by mouth 3 (three) times a day     • diphenhydrAMINE HCl (ELIAN-SELTZER PLUS ALLERGY PO) Take by mouth     • acyclovir (Zovirax) 800 mg tablet Take 1 tablet (800 mg total) by mouth 2 (two) times a day for 10 days (Patient not taking: Reported on 2022) 20 tablet 0     No current facility-administered medications for this visit.      No Known Allergies   Immunizations:     Immunization History   Administered Date(s) Administered   • INFLUENZA 2010, 11/12/2019   • Pneumococcal Polysaccharide PPV23 04/06/2021   • Tdap 04/06/2021      Health Maintenance:         Topic Date Due   • Colorectal Cancer Screening  05/24/2020   • HIV Screening  Completed   • Hepatitis C Screening  Completed         Topic Date Due   • COVID-19 Vaccine (1) Never done   • Hepatitis A Vaccine (1 of 2 - Risk 2-dose series) Never done   • Pneumococcal Vaccine: Pediatrics (0 to 5 Years) and At-Risk Patients (6 to 59 Years) (2 - PCV) 04/06/2022   • Influenza Vaccine (1) 09/01/2023      Medicare Screening Tests and Risk Assessments:     Gertrude Horne is here for his Subsequent Wellness visit. Last Medicare Wellness visit information reviewed, patient interviewed and updates made to the record today. Health Risk Assessment:   Patient rates overall health as good. Patient feels that their physical health rating is much worse. Patient is satisfied with their life. Eyesight was rated as same. Hearing was rated as same. Patient feels that their emotional and mental health rating is slightly better. Patients states they are sometimes angry. Patient states they are sometimes unusually tired/fatigued. Pain experienced in the last 7 days has been none. Patient states that he has experienced no weight loss or gain in last 6 months. Depression Screening:   PHQ-2 Score: 3  PHQ-9 Score: 8      Fall Risk Screening: In the past year, patient has experienced: no history of falling in past year      Home Safety:  Patient does not have trouble with stairs inside or outside of their home. Patient has working smoke alarms and has working carbon monoxide detector. Home safety hazards include: none. Nutrition:   Current diet is Regular, Limited junk food, Low Cholesterol, Low Saturated Fat, Low Carb and No Added Salt. Pt has some/very limited protein intake, lots of blueberries     Medications:   Patient is currently taking over-the-counter supplements. OTC medications include: multivitamin .  Patient is able to manage medications. Activities of Daily Living (ADLs)/Instrumental Activities of Daily Living (IADLs):   Walk and transfer into and out of bed and chair?: Yes  Dress and groom yourself?: Yes    Bathe or shower yourself?: Yes    Feed yourself?  Yes  Do your laundry/housekeeping?: Yes  Manage your money, pay your bills and track your expenses?: No  Make your own meals?: Yes    Do your own shopping?: Yes    Previous Hospitalizations:   Any hospitalizations or ED visits within the last 12 months?: Yes    How many hospitalizations have you had in the last year?: more than 4    Hospitalization Comments: "too many to count" -- drug related hospitalizations     Advance Care Planning:   Living will: No    Durable POA for healthcare: No    Advanced directive: No    Advanced directive counseling given: Yes    Five wishes given: No    Patient declined ACP directive: Yes    End of Life Decisions reviewed with patient: Yes    Provider agrees with end of life decisions: Yes      Cognitive Screening:   Provider or family/friend/caregiver concerned regarding cognition?: No    PREVENTIVE SCREENINGS      Cardiovascular Screening:    General: Screening Not Indicated, History Lipid Disorder and Risks and Benefits Discussed      Diabetes Screening:     General: Screening Current and Risks and Benefits Discussed      Colorectal Cancer Screening:     General: Risks and Benefits Discussed      Prostate Cancer Screening:    General: Screening Not Indicated and Risks and Benefits Discussed      Osteoporosis Screening:    General: Risks and Benefits Discussed and Screening Not Indicated      Abdominal Aortic Aneurysm (AAA) Screening:    Risk factors include: tobacco use        General: Risks and Benefits Discussed and Screening Not Indicated      Lung Cancer Screening:     General: Screening Not Indicated and Risks and Benefits Discussed      Hepatitis C Screening:    General: Screening Current and Risks and Benefits Discussed Hep C Screening Accepted: Yes        Preventive Screening Comments: Patient is now 2 weeks free of methamphetamine. He states this has ended his desire to be homosexual.  He thinks that is a good thing. We talked at length about the ravages of drug use and abuse. His only medication at present time is BuSpar 10 mg 3 times daily but he hardly ever takes it 3 times daily. Healthy lifestyle was stressed eating 3 meals a day eating wholesome food fresh fruits and vegetables not doing any drugs that are not prescribed by a doctor etc. ending smoking he desires to her right now it is helping him with the withdrawal of meth so he is not quick to end    Screening, Brief Intervention, and Referral to Treatment (SBIRT)    Screening  Typical number of drinks in a day: 0  Typical number of drinks in a week: 0  Interpretation: Low risk drinking behavior. Single Item Drug Screening:  How often have you used an illegal drug (including marijuana) or a prescription medication for non-medical reasons in the past year? daily or almost daily    Single Item Drug Screen Score: 4  Interpretation: POSITIVE screen for possible drug use disorder    Drug Abuse Screening Test (DAST-10):  1) Have you used drugs other than those required for medical reasons? Yes  2) Do you abuse more than one drug at a time? No  3) Are you always able to stop using drugs when you want to? No  4) Have you had "blackouts" or "flashbacks" as a result of drug use? Yes  5) Do you ever feel bad or guilty about your drug use? Yes  6) Does your spouse (or parents) ever complain about your involvement with drugs? Yes  7) Have you neglected your family because of your use of drugs? Yes  8) Have you engaged in illegal activities in order to obtain drugs? No  9) Have you ever experienced withdrawal symptoms (felt sick) when you stopped taking drugs?  Yes  10) Have you had medical problems as a result of your drug use (e.g., memory loss, hepatitis, convulsions, bleeding, etc.)? No    DAST-10 Score: 7  Interpretation: Substantial level problems related to drug abuse    Brief Intervention  Alcohol & drug use screenings were reviewed. No concerns regarding substance use disorder identified. Healthy alcohol use/limits discussed. Other Counseling Topics:   Car/seat belt/driving safety, skin self-exam, sunscreen and calcium and vitamin D intake and regular weightbearing exercise. No results found.      Physical Exam:     /66 (BP Location: Left arm, Patient Position: Sitting, Cuff Size: Standard)   Pulse 88   Temp 99.7 °F (37.6 °C) (Temporal)   Ht 6' 2" (1.88 m)   Wt 68.3 kg (150 lb 9.6 oz)   SpO2 97%   BMI 19.34 kg/m²     Physical Exam     Milagro Mcgraw DO

## 2023-10-03 NOTE — ASSESSMENT & PLAN NOTE
BuSpar 10 mg three times a day. He takes it as needed. Discussed that he may take his in the morning, noon, and night. Assured him that he may not take it within 6 or 8 hours intervals.

## 2023-10-03 NOTE — ASSESSMENT & PLAN NOTE
On methamphetamine about 1 year but discontinued 2 weeks ago. Feels much better and the one good thing about it, it has taken his cerrato lifestyle out of the picture. No longer interested.

## 2024-02-20 ENCOUNTER — APPOINTMENT (EMERGENCY)
Dept: RADIOLOGY | Facility: HOSPITAL | Age: 34
End: 2024-02-20
Payer: MEDICARE

## 2024-02-20 ENCOUNTER — HOSPITAL ENCOUNTER (EMERGENCY)
Facility: HOSPITAL | Age: 34
Discharge: HOME | End: 2024-02-20
Attending: EMERGENCY MEDICINE
Payer: MEDICARE

## 2024-02-20 VITALS
HEART RATE: 80 BPM | BODY MASS INDEX: 16.69 KG/M2 | TEMPERATURE: 97.6 F | OXYGEN SATURATION: 99 % | SYSTOLIC BLOOD PRESSURE: 145 MMHG | RESPIRATION RATE: 18 BRPM | WEIGHT: 130 LBS | DIASTOLIC BLOOD PRESSURE: 90 MMHG

## 2024-02-20 DIAGNOSIS — L02.413 ABSCESS OF RIGHT FOREARM: Primary | ICD-10-CM

## 2024-02-20 DIAGNOSIS — F19.90 ACTIVE INTRAVENOUS DRUG USE: ICD-10-CM

## 2024-02-20 PROCEDURE — 99282 EMERGENCY DEPT VISIT SF MDM: CPT

## 2024-02-20 PROCEDURE — 99284 EMERGENCY DEPT VISIT MOD MDM: CPT | Performed by: EMERGENCY MEDICINE

## 2024-02-20 PROCEDURE — 10061 I&D ABSCESS COMP/MULTIPLE: CPT | Performed by: EMERGENCY MEDICINE

## 2024-02-20 PROCEDURE — 73090 X-RAY EXAM OF FOREARM: CPT

## 2024-02-20 RX ORDER — ONDANSETRON 4 MG/1
4 TABLET, FILM COATED ORAL EVERY 6 HOURS PRN
Qty: 28 TABLET | Refills: 0 | Status: SHIPPED | OUTPATIENT
Start: 2024-02-20 | End: 2024-02-27

## 2024-02-20 RX ORDER — LIDOCAINE HYDROCHLORIDE AND EPINEPHRINE 10; 10 MG/ML; UG/ML
5 INJECTION, SOLUTION INFILTRATION; PERINEURAL ONCE
Status: COMPLETED | OUTPATIENT
Start: 2024-02-20 | End: 2024-02-20

## 2024-02-20 RX ORDER — DOXYCYCLINE HYCLATE 100 MG/1
100 CAPSULE ORAL ONCE
Status: COMPLETED | OUTPATIENT
Start: 2024-02-20 | End: 2024-02-20

## 2024-02-20 RX ORDER — DOXYCYCLINE HYCLATE 100 MG/1
100 CAPSULE ORAL 2 TIMES DAILY
Qty: 14 CAPSULE | Refills: 0 | Status: SHIPPED | OUTPATIENT
Start: 2024-02-20 | End: 2024-02-27

## 2024-02-20 RX ADMIN — LIDOCAINE HYDROCHLORIDE,EPINEPHRINE BITARTRATE 5 ML: 10; .01 INJECTION, SOLUTION INFILTRATION; PERINEURAL at 03:45

## 2024-02-20 RX ADMIN — DOXYCYCLINE 100 MG: 100 CAPSULE ORAL at 04:12

## 2024-02-20 NOTE — DISCHARGE INSTRUCTIONS
I sent 2 prescriptions to the pharmacy.  The first is doxycycline which she will take 1 capsule twice a day for the next 7 days.  I also sent you home with a prescription for Zofran as this doxycycline can make you nauseous.  If you do develop nausea with this you can take 1 tablet of the Zofran every 6 hours as needed for nausea.    Please follow-up with your primary care doctor as they may help you with programs that can help you refrain from recreational drug use such as methamphetamines.    Please return to the ER if you develop any fevers or chills.  Please also return if you do not see any improvement in the redness in your arm by Thursday, February 22.

## 2024-02-20 NOTE — ED PROVIDER NOTES
History  Chief Complaint   Patient presents with    Abscess     Patient c/o abscess on right forearm for 4 days from injecting meth, redness around abscess     34-year-old male past medical stay significant for injection drug use with methamphetamine presenting to ED today for swelling of the right forearm for the last 4 days.  Noted redness prior to and then started with swelling over the last 4 days.  Increasing in size and pain since waking today.  Is still currently injecting IV meth.  No fevers or chills.  No chest pain or shortness of breath.        Prior to Admission Medications   Prescriptions Last Dose Informant Patient Reported? Taking?   acyclovir (Zovirax) 800 mg tablet   No No   Sig: Take 1 tablet (800 mg total) by mouth 2 (two) times a day for 10 days   Patient not taking: Reported on 7/26/2022   busPIRone (BUSPAR) 10 mg tablet  Self Yes No   Sig: Take 1 tablet by mouth 3 (three) times a day   diphenhydrAMINE HCl (ELIAN-SELTZER PLUS ALLERGY PO)  Self Yes No   Sig: Take by mouth      Facility-Administered Medications: None       Past Medical History:   Diagnosis Date    Asperger's syndrome     COPD (chronic obstructive pulmonary disease) (HCC)     GERD (gastroesophageal reflux disease)     Headache(784.0)     Rectal prolapse     Rectal prolapse 05/09/2018       Past Surgical History:   Procedure Laterality Date    HERNIA REPAIR      [place] COLONOSCOPY FLX DX W/COLLJ SPEC WHEN PFRMD N/A 5/7/2018    Procedure: COLONOSCOPY;  Surgeon: Yvonne Cole MD;  Location: [facility];  Service: Colorectal    HI LAPAROSCOPY PROCTOPEXY PROLAPSE SIGMOID RESCJ N/A 5/8/2018    Procedure: LAPAROSCOPIC HAND-ASSIST REPAIR OF RECTAL PROLAPSE BY SACRAL  PROTOPEXY;  Surgeon: Yvonne Cole MD;  [facility];  Service: Colorectal    HI SIGMOIDOSCOPY FLX DX W/COLLJ SPEC BR/WA IF PFRMD N/A 6/19/2018    Procedure: SIGMOIDOSCOPY FLEXIBLE;  Surgeon: Yvonne Cole MD;  [facility];  Service: Colorectal     SURGERY SCROTAL / TESTICULAR         Family History   Family history unknown: Yes     I have reviewed and agree with the history as documented.    E-Cigarette/Vaping    E-Cigarette Use Never User      E-Cigarette/Vaping Substances    Nicotine No     THC No     CBD No     Flavoring No     Other No     Unknown No      Social History     Tobacco Use    Smoking status: Every Day     Current packs/day: 1.00     Average packs/day: 1 pack/day for 15.0 years (15.0 ttl pk-yrs)     Types: Cigarettes    Smokeless tobacco: Never   Vaping Use    Vaping status: Never Used   Substance Use Topics    Alcohol use: No    Drug use: Yes     Types: Methamphetamines     Comment: Daily user -- would like to quit 2-28-23       Review of Systems   Constitutional:  Negative for chills and fever.   HENT:  Negative for hearing loss.    Eyes:  Negative for visual disturbance.   Respiratory:  Negative for shortness of breath.    Cardiovascular:  Negative for chest pain.   Gastrointestinal:  Negative for abdominal pain, constipation, diarrhea, nausea and vomiting.   Genitourinary:  Negative for difficulty urinating.   Musculoskeletal:  Negative for myalgias.   Skin:  Positive for rash and wound.   Neurological:  Negative for dizziness.   Psychiatric/Behavioral:  Negative for agitation.    All other systems reviewed and are negative.      Physical Exam  Physical Exam  Vitals and nursing note reviewed.   Constitutional:       General: He is not in acute distress.     Appearance: Normal appearance. He is not ill-appearing.   HENT:      Head: Normocephalic and atraumatic.      Right Ear: External ear normal.      Left Ear: External ear normal.      Nose: Nose normal. No congestion.      Mouth/Throat:      Mouth: Mucous membranes are moist.      Pharynx: Oropharynx is clear. No oropharyngeal exudate.   Eyes:      Extraocular Movements: Extraocular movements intact.      Conjunctiva/sclera: Conjunctivae normal.      Pupils: Pupils are equal, round, and  reactive to light.   Cardiovascular:      Rate and Rhythm: Normal rate and regular rhythm.      Pulses: Normal pulses.      Heart sounds: Normal heart sounds. No murmur heard.     No friction rub. No gallop.   Pulmonary:      Effort: Pulmonary effort is normal. No respiratory distress.      Breath sounds: Normal breath sounds. No wheezing.   Abdominal:      General: Abdomen is flat. Bowel sounds are normal. There is no distension.      Palpations: Abdomen is soft.      Tenderness: There is no abdominal tenderness.   Musculoskeletal:         General: No swelling. Normal range of motion.      Cervical back: Normal range of motion.   Skin:     General: Skin is warm and dry.      Capillary Refill: Capillary refill takes less than 2 seconds.      Comments: Patient has an area of swelling on the right forearm with surrounding erythema.  See picture attached in chart.  No red streaking up the arm and no other areas of erythema on the upper extremity of the right side.   Neurological:      General: No focal deficit present.      Mental Status: He is alert and oriented to person, place, and time. Mental status is at baseline.      Cranial Nerves: No cranial nerve deficit.      Sensory: No sensory deficit.      Motor: No weakness.      Gait: Gait normal.   Psychiatric:         Mood and Affect: Mood normal.         Behavior: Behavior normal.         Vital Signs  ED Triage Vitals [02/20/24 0322]   Temperature Pulse Respirations Blood Pressure SpO2   97.6 °F (36.4 °C) 80 18 145/90 99 %      Temp src Heart Rate Source Patient Position - Orthostatic VS BP Location FiO2 (%)   -- -- -- -- --      Pain Score       7           Vitals:    02/20/24 0322   BP: 145/90   Pulse: 80         Visual Acuity      ED Medications  Medications   doxycycline hyclate (VIBRAMYCIN) capsule 100 mg (has no administration in time range)   lidocaine-epinephrine (XYLOCAINE/EPINEPHRINE) 1 %-1:100,000 injection 5 mL (5 mL Infiltration Given 2/20/24 1259)        Diagnostic Studies  Results Reviewed       None                   XR forearm 2 views RIGHT   ED Interpretation by Alfredo Marcial MD (02/20 0351)   I interpreted this x-ray as no foreign body and no acute osseous abnormality.                 Procedures  Incision and drain    Date/Time: 2/20/2024 3:44 AM    Performed by: Alfredo Marcial MD  Authorized by: Alfredo Marcial MD  Universal Protocol:  Consent: Verbal consent obtained.  Risks and benefits: risks, benefits and alternatives were discussed  Consent given by: patient  Required items: required blood products, implants, devices, and special equipment available  Patient identity confirmed: verbally with patient    Patient location:  ED  Location:     Type:  Abscess    Location:  Upper extremity    Upper extremity location:  R arm  Pre-procedure details:     Skin preparation:  Chloraprep  Anesthesia (see MAR for exact dosages):     Anesthesia method:  Local infiltration    Local anesthetic:  Lidocaine 1% WITH epi  Procedure details:     Complexity:  Simple    Incision types:  Stab incision    Scalpel blade:  11    Approach:  Open    Incision depth:  Subcutaneous    Wound management:  Probed and deloculated, irrigated with saline and extensive cleaning    Drainage:  Purulent    Drainage amount:  Moderate    Wound treatment:  Wound left open    Packing materials:  None  Post-procedure details:     Patient tolerance of procedure:  Tolerated well, no immediate complications           ED Course                               SBIRT 20yo+      Flowsheet Row Most Recent Value   Initial Alcohol Screen: US AUDIT-C     1. How often do you have a drink containing alcohol? 1 Filed at: 02/20/2024 0325   2. How many drinks containing alcohol do you have on a typical day you are drinking?  0 Filed at: 02/20/2024 0325   3a. Male UNDER 65: How often do you have five or more drinks on one occasion? 0 Filed at: 02/20/2024 0325   Audit-C Score 1 Filed at: 02/20/2024 0325   ROCHELLE: How  many times in the past year have you...    Used an illegal drug or used a prescription medication for non-medical reasons? Daily or Almost Daily Filed at: 02/20/2024 0325                      Medical Decision Making  34-year-old male presenting to ED today with swelling of the right forearm.  This is consistent with cellulitis with concurrent abscess of the right forearm.  Bedside I&D was performed.  I did do an x-ray prior to I&D with concern for foreign body given that the patient was not sure whether or not there could be a needle in his arm.  Incision draining showing purulent drainage.  Concerned about patient compliance with dual antibiotics of Keflex and Bactrim and so we will choose to do doxycycline.  Discussed with patient return precautions such as fever, worsening erythema, chest pain, shortness of breath and encouraged him to return should she have any of those signs.  Also encouraged him to return should he fail outpatient antibiotics after 48 hours of therapy.  I did also send him a prescription for Zofran with his doxycycline prescription as doxycycline is known to cause nausea.  Patient discharged home.    Amount and/or Complexity of Data Reviewed  Radiology: ordered and independent interpretation performed.    Risk  Prescription drug management.             Disposition  Final diagnoses:   Abscess of right forearm   Active intravenous drug use     Time reflects when diagnosis was documented in both MDM as applicable and the Disposition within this note       Time User Action Codes Description Comment    2/20/2024  3:53 AM Alfredo Marcial Add [L02.413] Abscess of right forearm     2/20/2024  3:53 AM Alfredo Marcial Add [F19.90] Active intravenous drug use           ED Disposition       ED Disposition   Discharge    Condition   Stable    Date/Time   Tue Feb 20, 2024 4145    Comment   Yuval Matute discharge to home/self care.                   Follow-up Information       Follow up With Specialties  Details Why Contact Info Additional Information    ALYSHA Heller, DO Family Medicine Schedule an appointment as soon as possible for a visit in 2 days for follow up [address] [facility]  [address]  [place] [id number] [phone]       Critical access hospital Emergency Department Emergency Medicine Go to  If symptoms worsen, As needed [address] [facility] [id number] [phone] [facility] Emergency Department, [address], [place], [address]            Patient's Medications   Discharge Prescriptions    DOXYCYCLINE HYCLATE (VIBRAMYCIN) 100 MG CAPSULE    Take 1 capsule (100 mg total) by mouth 2 (two) times a day for 7 days       Start Date: 2/20/2024 End Date: 2/27/2024       Order Dose: 100 mg       Quantity: 14 capsule    Refills: 0    ONDANSETRON (ZOFRAN) 4 MG TABLET    Take 1 tablet (4 mg total) by mouth every 6 (six) hours as needed for nausea or vomiting for up to 7 days       Start Date: 2/20/2024 End Date: 2/27/2024       Order Dose: 4 mg       Quantity: 28 tablet    Refills: 0       No discharge procedures on file.    PDMP Review       None            ED Provider  Electronically Signed by             Alfredo Marcial MD  02/20/24 9276

## 2024-02-24 ENCOUNTER — HOSPITAL ENCOUNTER (EMERGENCY)
Facility: HOSPITAL | Age: 34
Discharge: HOME | End: 2024-02-24
Attending: EMERGENCY MEDICINE | Admitting: EMERGENCY MEDICINE
Payer: MEDICARE

## 2024-02-24 VITALS
RESPIRATION RATE: 18 BRPM | OXYGEN SATURATION: 98 % | TEMPERATURE: 98.3 F | DIASTOLIC BLOOD PRESSURE: 83 MMHG | HEART RATE: 89 BPM | SYSTOLIC BLOOD PRESSURE: 124 MMHG

## 2024-02-24 DIAGNOSIS — Z51.89 WOUND CHECK, ABSCESS: Primary | ICD-10-CM

## 2024-02-24 PROCEDURE — 99024 POSTOP FOLLOW-UP VISIT: CPT | Performed by: EMERGENCY MEDICINE

## 2024-02-24 PROCEDURE — 99283 EMERGENCY DEPT VISIT LOW MDM: CPT
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
## 2024-03-12 ENCOUNTER — LAB REQUISITION (OUTPATIENT)
Dept: LAB | Facility: HOSPITAL | Age: 34
End: 2024-03-12

## 2024-03-12 DIAGNOSIS — Z02.83 ENCOUNTER FOR BLOOD-ALCOHOL AND BLOOD-DRUG TEST: ICD-10-CM

## 2024-04-09 ENCOUNTER — HOSPITAL ENCOUNTER (EMERGENCY)
Facility: HOSPITAL | Age: 34
Discharge: HOME | End: 2024-04-09
Attending: EMERGENCY MEDICINE
Payer: COMMERCIAL

## 2024-04-09 VITALS
DIASTOLIC BLOOD PRESSURE: 85 MMHG | SYSTOLIC BLOOD PRESSURE: 124 MMHG | HEART RATE: 82 BPM | TEMPERATURE: 97.7 F | RESPIRATION RATE: 18 BRPM | OXYGEN SATURATION: 100 %
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
## 2024-04-09 PROCEDURE — 99282 EMERGENCY DEPT VISIT SF MDM: CPT | Performed by: EMERGENCY MEDICINE

## 2024-04-09 PROCEDURE — 99284 EMERGENCY DEPT VISIT MOD MDM: CPT
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
## 2024-07-18 ENCOUNTER — APPOINTMENT (EMERGENCY)
Dept: RADIOLOGY | Facility: HOSPITAL | Age: 34
End: 2024-07-18
Payer: COMMERCIAL

## 2024-07-18 ENCOUNTER — APPOINTMENT (EMERGENCY)
Dept: CT IMAGING | Facility: HOSPITAL | Age: 34
End: 2024-07-18
Payer: COMMERCIAL

## 2024-07-18 ENCOUNTER — HOSPITAL ENCOUNTER (EMERGENCY)
Facility: HOSPITAL | Age: 34
Discharge: HOME | End: 2024-07-18
Attending: SURGERY
Payer: COMMERCIAL

## 2024-07-18 VITALS
TEMPERATURE: 98.5 F | OXYGEN SATURATION: 98 % | WEIGHT: 163.36 LBS | SYSTOLIC BLOOD PRESSURE: 111 MMHG | WEIGHT: 163.36 LBS | TEMPERATURE: 98.5 F | DIASTOLIC BLOOD PRESSURE: 67 MMHG | OXYGEN SATURATION: 98 % | RESPIRATION RATE: 20 BRPM | SYSTOLIC BLOOD PRESSURE: 111 MMHG | RESPIRATION RATE: 20 BRPM | HEART RATE: 80 BPM | DIASTOLIC BLOOD PRESSURE: 67 MMHG | HEART RATE: 80 BPM
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
## 2024-07-18 LAB
ABO GROUP BLD: NORMAL
ABO GROUP BLD: NORMAL
ANION GAP SERPL CALCULATED.3IONS-SCNC: 6 MMOL/L (ref 4–13)
ANION GAP SERPL CALCULATED.3IONS-SCNC: 6 MMOL/L (ref 4–13)
APTT PPP: 24 SECONDS (ref 23–37)
APTT PPP: 24 SECONDS (ref 23–37)
BASE EXCESS BLDA CALC-SCNC: 7 MMOL/L (ref -2–3)
BASE EXCESS BLDA CALC-SCNC: 7 MMOL/L (ref -2–3)
BASOPHILS # BLD AUTO: 0.06 THOUSANDS/ÂΜL (ref 0–0.1)
BASOPHILS # BLD AUTO: 0.06 THOUSANDS/ÂΜL (ref 0–0.1)
BASOPHILS NFR BLD AUTO: 1 % (ref 0–1)
BASOPHILS NFR BLD AUTO: 1 % (ref 0–1)
BLD GP AB SCN SERPL QL: NEGATIVE
BLD GP AB SCN SERPL QL: NEGATIVE
BUN SERPL-MCNC: 21 MG/DL (ref 5–25)
BUN SERPL-MCNC: 21 MG/DL (ref 5–25)
CA-I BLD-SCNC: 1.18 MMOL/L (ref 1.12–1.32)
CA-I BLD-SCNC: 1.18 MMOL/L (ref 1.12–1.32)
CALCIUM SERPL-MCNC: 9.2 MG/DL (ref 8.4–10.2)
CALCIUM SERPL-MCNC: 9.2 MG/DL (ref 8.4–10.2)
CHLORIDE SERPL-SCNC: 104 MMOL/L (ref 96–108)
CHLORIDE SERPL-SCNC: 104 MMOL/L (ref 96–108)
CO2 SERPL-SCNC: 28 MMOL/L (ref 21–32)
CO2 SERPL-SCNC: 28 MMOL/L (ref 21–32)
CREAT SERPL-MCNC: 0.95 MG/DL (ref 0.6–1.3)
CREAT SERPL-MCNC: 0.95 MG/DL (ref 0.6–1.3)
EOSINOPHIL # BLD AUTO: 0.14 THOUSAND/ÂΜL (ref 0–0.61)
EOSINOPHIL # BLD AUTO: 0.14 THOUSAND/ÂΜL (ref 0–0.61)
EOSINOPHIL NFR BLD AUTO: 2 % (ref 0–6)
EOSINOPHIL NFR BLD AUTO: 2 % (ref 0–6)
ERYTHROCYTE [DISTWIDTH] IN BLOOD BY AUTOMATED COUNT: 13 % (ref 11.6–15.1)
ERYTHROCYTE [DISTWIDTH] IN BLOOD BY AUTOMATED COUNT: 13 % (ref 11.6–15.1)
GFR SERPL CREATININE-BSD FRML MDRD: 104 ML/MIN/1.73SQ M
GFR SERPL CREATININE-BSD FRML MDRD: 104 ML/MIN/1.73SQ M
GLUCOSE SERPL-MCNC: 91 MG/DL (ref 65–140)
GLUCOSE SERPL-MCNC: 91 MG/DL (ref 65–140)
GLUCOSE SERPL-MCNC: 94 MG/DL (ref 65–140)
GLUCOSE SERPL-MCNC: 94 MG/DL (ref 65–140)
HCO3 BLDA-SCNC: 35.1 MMOL/L (ref 24–30)
HCO3 BLDA-SCNC: 35.1 MMOL/L (ref 24–30)
HCT VFR BLD AUTO: 42 % (ref 36.5–49.3)
HCT VFR BLD AUTO: 42 % (ref 36.5–49.3)
HCT VFR BLD CALC: 45 % (ref 36.5–49.3)
HCT VFR BLD CALC: 45 % (ref 36.5–49.3)
HGB BLD-MCNC: 14 G/DL (ref 12–17)
HGB BLD-MCNC: 14 G/DL (ref 12–17)
HGB BLDA-MCNC: 15.3 G/DL (ref 12–17)
HGB BLDA-MCNC: 15.3 G/DL (ref 12–17)
IMM GRANULOCYTES # BLD AUTO: 0.01 THOUSAND/UL (ref 0–0.2)
IMM GRANULOCYTES # BLD AUTO: 0.01 THOUSAND/UL (ref 0–0.2)
IMM GRANULOCYTES NFR BLD AUTO: 0 % (ref 0–2)
IMM GRANULOCYTES NFR BLD AUTO: 0 % (ref 0–2)
INR PPP: 0.9 (ref 0.84–1.19)
INR PPP: 0.9 (ref 0.84–1.19)
LYMPHOCYTES # BLD AUTO: 2.7 THOUSANDS/ÂΜL (ref 0.6–4.47)
LYMPHOCYTES # BLD AUTO: 2.7 THOUSANDS/ÂΜL (ref 0.6–4.47)
LYMPHOCYTES NFR BLD AUTO: 44 % (ref 14–44)
LYMPHOCYTES NFR BLD AUTO: 44 % (ref 14–44)
MCH RBC QN AUTO: 28.1 PG (ref 26.8–34.3)
MCH RBC QN AUTO: 28.1 PG (ref 26.8–34.3)
MCHC RBC AUTO-ENTMCNC: 33.3 G/DL (ref 31.4–37.4)
MCHC RBC AUTO-ENTMCNC: 33.3 G/DL (ref 31.4–37.4)
MCV RBC AUTO: 84 FL (ref 82–98)
MCV RBC AUTO: 84 FL (ref 82–98)
MONOCYTES # BLD AUTO: 0.59 THOUSAND/ÂΜL (ref 0.17–1.22)
MONOCYTES # BLD AUTO: 0.59 THOUSAND/ÂΜL (ref 0.17–1.22)
MONOCYTES NFR BLD AUTO: 10 % (ref 4–12)
MONOCYTES NFR BLD AUTO: 10 % (ref 4–12)
NEUTROPHILS # BLD AUTO: 2.67 THOUSANDS/ÂΜL (ref 1.85–7.62)
NEUTROPHILS # BLD AUTO: 2.67 THOUSANDS/ÂΜL (ref 1.85–7.62)
NEUTS SEG NFR BLD AUTO: 43 % (ref 43–75)
NEUTS SEG NFR BLD AUTO: 43 % (ref 43–75)
NRBC BLD AUTO-RTO: 0 /100 WBCS
NRBC BLD AUTO-RTO: 0 /100 WBCS
PCO2 BLD: 37 MMOL/L (ref 21–32)
PCO2 BLD: 37 MMOL/L (ref 21–32)
PCO2 BLD: 61.5 MM HG (ref 42–50)
PCO2 BLD: 61.5 MM HG (ref 42–50)
PH BLD: 7.36 [PH] (ref 7.3–7.4)
PH BLD: 7.36 [PH] (ref 7.3–7.4)
PLATELET # BLD AUTO: 248 THOUSANDS/UL (ref 149–390)
PLATELET # BLD AUTO: 248 THOUSANDS/UL (ref 149–390)
PMV BLD AUTO: 9.3 FL (ref 8.9–12.7)
PMV BLD AUTO: 9.3 FL (ref 8.9–12.7)
PO2 BLD: 22 MM HG (ref 35–45)
PO2 BLD: 22 MM HG (ref 35–45)
POTASSIUM BLD-SCNC: 5.7 MMOL/L (ref 3.5–5.3)
POTASSIUM BLD-SCNC: 5.7 MMOL/L (ref 3.5–5.3)
POTASSIUM SERPL-SCNC: 4.1 MMOL/L (ref 3.5–5.3)
POTASSIUM SERPL-SCNC: 4.1 MMOL/L (ref 3.5–5.3)
PROTHROMBIN TIME: 12.7 SECONDS (ref 11.6–14.5)
PROTHROMBIN TIME: 12.7 SECONDS (ref 11.6–14.5)
RBC # BLD AUTO: 4.98 MILLION/UL (ref 3.88–5.62)
RBC # BLD AUTO: 4.98 MILLION/UL (ref 3.88–5.62)
RH BLD: POSITIVE
RH BLD: POSITIVE
SAO2 % BLD FROM PO2: 35 % (ref 60–85)
SAO2 % BLD FROM PO2: 35 % (ref 60–85)
SODIUM BLD-SCNC: 140 MMOL/L (ref 136–145)
SODIUM BLD-SCNC: 140 MMOL/L (ref 136–145)
SODIUM SERPL-SCNC: 138 MMOL/L (ref 135–147)
SODIUM SERPL-SCNC: 138 MMOL/L (ref 135–147)
SPECIMEN EXPIRATION DATE: NORMAL
SPECIMEN EXPIRATION DATE: NORMAL
SPECIMEN SOURCE: ABNORMAL
SPECIMEN SOURCE: ABNORMAL
WBC # BLD AUTO: 6.17 THOUSAND/UL (ref 4.31–10.16)
WBC # BLD AUTO: 6.17 THOUSAND/UL (ref 4.31–10.16)

## 2024-07-18 PROCEDURE — 80048 BASIC METABOLIC PNL TOTAL CA: CPT | Performed by: SURGERY

## 2024-07-18 PROCEDURE — 82803 BLOOD GASES ANY COMBINATION: CPT

## 2024-07-18 PROCEDURE — 84132 ASSAY OF SERUM POTASSIUM: CPT

## 2024-07-18 PROCEDURE — 84295 ASSAY OF SERUM SODIUM: CPT

## 2024-07-18 PROCEDURE — 82947 ASSAY GLUCOSE BLOOD QUANT: CPT

## 2024-07-18 PROCEDURE — 85610 PROTHROMBIN TIME: CPT | Performed by: SURGERY

## 2024-07-18 PROCEDURE — 73130 X-RAY EXAM OF HAND: CPT

## 2024-07-18 PROCEDURE — 72125 CT NECK SPINE W/O DYE: CPT

## 2024-07-18 PROCEDURE — 85025 COMPLETE CBC W/AUTO DIFF WBC: CPT | Performed by: SURGERY

## 2024-07-18 PROCEDURE — 72170 X-RAY EXAM OF PELVIS: CPT

## 2024-07-18 PROCEDURE — 82330 ASSAY OF CALCIUM: CPT

## 2024-07-18 PROCEDURE — 85014 HEMATOCRIT: CPT

## 2024-07-18 PROCEDURE — 71260 CT THORAX DX C+: CPT

## 2024-07-18 PROCEDURE — EDAIR PR ED AIR: Performed by: EMERGENCY MEDICINE

## 2024-07-18 PROCEDURE — 86850 RBC ANTIBODY SCREEN: CPT | Performed by: SURGERY

## 2024-07-18 PROCEDURE — 71045 X-RAY EXAM CHEST 1 VIEW: CPT

## 2024-07-18 PROCEDURE — 70450 CT HEAD/BRAIN W/O DYE: CPT

## 2024-07-18 PROCEDURE — 86900 BLOOD TYPING SEROLOGIC ABO: CPT | Performed by: SURGERY

## 2024-07-18 PROCEDURE — 99204 OFFICE O/P NEW MOD 45 MIN: CPT | Performed by: SURGERY

## 2024-07-18 PROCEDURE — 85730 THROMBOPLASTIN TIME PARTIAL: CPT | Performed by: SURGERY

## 2024-07-18 PROCEDURE — 74177 CT ABD & PELVIS W/CONTRAST: CPT

## 2024-07-18 PROCEDURE — 93005 ELECTROCARDIOGRAM TRACING: CPT

## 2024-07-18 PROCEDURE — 93308 TTE F-UP OR LMTD: CPT | Performed by: NURSE PRACTITIONER

## 2024-07-18 PROCEDURE — 36415 COLL VENOUS BLD VENIPUNCTURE: CPT | Performed by: SURGERY

## 2024-07-18 PROCEDURE — 86901 BLOOD TYPING SEROLOGIC RH(D): CPT | Performed by: SURGERY

## 2024-07-18 PROCEDURE — 76705 ECHO EXAM OF ABDOMEN: CPT | Performed by: NURSE PRACTITIONER

## 2024-07-18 PROCEDURE — 99285 EMERGENCY DEPT VISIT HI MDM: CPT

## 2024-07-18 PROCEDURE — 76604 US EXAM CHEST: CPT | Performed by: NURSE PRACTITIONER

## 2024-07-18 RX ORDER — METHOCARBAMOL 500 MG/1
500 TABLET, FILM COATED ORAL EVERY 6 HOURS PRN
Status: DISCONTINUED | OUTPATIENT
Start: 2024-07-18 | End: 2024-07-19 | Stop reason: HOSPADM

## 2024-07-18 RX ORDER — FENTANYL CITRATE 50 UG/ML
1 INJECTION, SOLUTION INTRAMUSCULAR; INTRAVENOUS ONCE
Status: COMPLETED | OUTPATIENT
Start: 2024-07-18 | End: 2024-07-18

## 2024-07-18 RX ORDER — LIDOCAINE 50 MG/G
1 PATCH TOPICAL DAILY
Status: DISCONTINUED | OUTPATIENT
Start: 2024-07-18 | End: 2024-07-19 | Stop reason: HOSPADM

## 2024-07-18 RX ORDER — ACETAMINOPHEN 325 MG/1
975 TABLET ORAL ONCE
Status: COMPLETED | OUTPATIENT
Start: 2024-07-18 | End: 2024-07-18

## 2024-07-18 RX ADMIN — IOHEXOL 100 ML: 350 INJECTION, SOLUTION INTRAVENOUS at 19:00

## 2024-07-18 RX ADMIN — METHOCARBAMOL 500 MG: 500 TABLET ORAL at 20:20

## 2024-07-18 RX ADMIN — ACETAMINOPHEN 975 MG: 325 TABLET, FILM COATED ORAL at 20:20

## 2024-07-18 RX ADMIN — LIDOCAINE 5% 1 PATCH: 700 PATCH TOPICAL at 20:21
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
## 2024-07-19 LAB
BASE EXCESS BLDA CALC-SCNC: 5 MMOL/L (ref -2–3)
BASE EXCESS BLDA CALC-SCNC: 5 MMOL/L (ref -2–3)
CA-I BLD-SCNC: 1.2 MMOL/L (ref 1.12–1.32)
CA-I BLD-SCNC: 1.2 MMOL/L (ref 1.12–1.32)
GLUCOSE SERPL-MCNC: 96 MG/DL (ref 65–140)
GLUCOSE SERPL-MCNC: 96 MG/DL (ref 65–140)
HCO3 BLDA-SCNC: 30.1 MMOL/L (ref 24–30)
HCO3 BLDA-SCNC: 30.1 MMOL/L (ref 24–30)
HCT VFR BLD CALC: 41 % (ref 36.5–49.3)
HCT VFR BLD CALC: 41 % (ref 36.5–49.3)
HGB BLDA-MCNC: 13.9 G/DL (ref 12–17)
HGB BLDA-MCNC: 13.9 G/DL (ref 12–17)
PCO2 BLD: 32 MMOL/L (ref 21–32)
PCO2 BLD: 32 MMOL/L (ref 21–32)
PCO2 BLD: 47 MM HG (ref 42–50)
PCO2 BLD: 47 MM HG (ref 42–50)
PH BLD: 7.42 [PH] (ref 7.3–7.4)
PH BLD: 7.42 [PH] (ref 7.3–7.4)
PO2 BLD: 53 MM HG (ref 35–45)
PO2 BLD: 53 MM HG (ref 35–45)
POTASSIUM BLD-SCNC: 4.2 MMOL/L (ref 3.5–5.3)
POTASSIUM BLD-SCNC: 4.2 MMOL/L (ref 3.5–5.3)
SAO2 % BLD FROM PO2: 87 % (ref 60–85)
SAO2 % BLD FROM PO2: 87 % (ref 60–85)
SODIUM BLD-SCNC: 141 MMOL/L (ref 136–145)
SODIUM BLD-SCNC: 141 MMOL/L (ref 136–145)
SPECIMEN SOURCE: ABNORMAL
SPECIMEN SOURCE: ABNORMAL

## 2024-07-19 NOTE — INCIDENTAL FINDINGS
The following findings require follow up:  Radiographic finding   Finding:   Subcentimeter hypoattenuating renal lesion(s), too small to characterize but statistically likely benign, which do not warrant follow-up (Radiology June 2019).   Mild S-shaped scoliotic curvature of the thoracolumbar spine. Transitional bilateral partial sacralization of L5. Benign-appearing nonossifying fibroma in the left iliac   Contracted. Small calcified stones. No inflammation.    Follow up required: PCP follow up    Follow up should be done within 1-2 week(s)    Please notify the following clinician to assist with the follow up:   Dr. Miller    Incidental finding results were discussed with the Patient by MOISES Osborne on 07/18/24.   They expressed understanding and all questions answered.

## 2024-07-21 LAB
ATRIAL RATE: 88 BPM
ATRIAL RATE: 88 BPM
P AXIS: 63 DEGREES
P AXIS: 63 DEGREES
PR INTERVAL: 126 MS
PR INTERVAL: 126 MS
QRS AXIS: 73 DEGREES
QRS AXIS: 73 DEGREES
QRSD INTERVAL: 90 MS
QRSD INTERVAL: 90 MS
QT INTERVAL: 350 MS
QT INTERVAL: 350 MS
QTC INTERVAL: 423 MS
QTC INTERVAL: 423 MS
T WAVE AXIS: 61 DEGREES
T WAVE AXIS: 61 DEGREES
VENTRICULAR RATE: 88 BPM
VENTRICULAR RATE: 88 BPM

## 2024-07-21 PROCEDURE — 93010 ELECTROCARDIOGRAM REPORT: CPT | Performed by: INTERNAL MEDICINE

## 2024-09-28 ENCOUNTER — HOSPITAL ENCOUNTER (INPATIENT)
Facility: HOSPITAL | Age: 34
LOS: 1 days | Discharge: LEFT BEFORE DISCHARGE | DRG: 392 | End: 2024-09-29
Attending: EMERGENCY MEDICINE | Admitting: SURGERY
Payer: MEDICARE

## 2024-09-28 PROCEDURE — 96361 HYDRATE IV INFUSION ADD-ON: CPT

## 2024-09-28 PROCEDURE — 36415 COLL VENOUS BLD VENIPUNCTURE: CPT

## 2024-09-28 PROCEDURE — 99284 EMERGENCY DEPT VISIT MOD MDM: CPT

## 2024-09-28 PROCEDURE — 96375 TX/PRO/DX INJ NEW DRUG ADDON: CPT

## 2024-09-28 PROCEDURE — 83690 ASSAY OF LIPASE: CPT

## 2024-09-28 PROCEDURE — 99285 EMERGENCY DEPT VISIT HI MDM: CPT | Performed by: EMERGENCY MEDICINE

## 2024-09-28 PROCEDURE — 80053 COMPREHEN METABOLIC PANEL: CPT

## 2024-09-28 PROCEDURE — 85025 COMPLETE CBC W/AUTO DIFF WBC: CPT

## 2024-09-28 RX ORDER — KETOROLAC TROMETHAMINE 30 MG/ML
15 INJECTION, SOLUTION INTRAMUSCULAR; INTRAVENOUS ONCE
Status: COMPLETED | OUTPATIENT
Start: 2024-09-28 | End: 2024-09-28

## 2024-09-28 RX ORDER — ONDANSETRON 2 MG/ML
4 INJECTION INTRAMUSCULAR; INTRAVENOUS ONCE
Status: COMPLETED | OUTPATIENT
Start: 2024-09-28 | End: 2024-09-28

## 2024-09-28 RX ADMIN — KETOROLAC TROMETHAMINE 15 MG: 30 INJECTION, SOLUTION INTRAMUSCULAR; INTRAVENOUS at 23:52

## 2024-09-28 RX ADMIN — ONDANSETRON 4 MG: 2 INJECTION INTRAMUSCULAR; INTRAVENOUS at 23:52

## 2024-09-28 RX ADMIN — SODIUM CHLORIDE 1000 ML: 0.9 INJECTION, SOLUTION INTRAVENOUS at 23:52

## 2024-09-29 ENCOUNTER — HOSPITAL ENCOUNTER (INPATIENT)
Facility: HOSPITAL | Age: 34
LOS: 1 days | Discharge: HOME | DRG: 394 | End: 2024-09-30
Attending: EMERGENCY MEDICINE
Payer: MEDICARE

## 2024-09-29 ENCOUNTER — APPOINTMENT (EMERGENCY)
Dept: CT IMAGING | Facility: HOSPITAL | Age: 34
DRG: 392 | End: 2024-09-29
Payer: MEDICARE

## 2024-09-29 VITALS
TEMPERATURE: 99.4 F | HEART RATE: 67 BPM | RESPIRATION RATE: 17 BRPM | DIASTOLIC BLOOD PRESSURE: 80 MMHG | SYSTOLIC BLOOD PRESSURE: 120 MMHG | OXYGEN SATURATION: 95 %

## 2024-09-29 DIAGNOSIS — K56.7 POSTOPERATIVE ILEUS (HCC): Primary | ICD-10-CM

## 2024-09-29 DIAGNOSIS — K91.89 POSTOPERATIVE ILEUS (HCC): Primary | ICD-10-CM

## 2024-09-29 DIAGNOSIS — R11.2 NAUSEA AND VOMITING: ICD-10-CM

## 2024-09-29 DIAGNOSIS — R10.9 ABDOMINAL PAIN, UNSPECIFIED ABDOMINAL LOCATION: ICD-10-CM

## 2024-09-29 DIAGNOSIS — F17.200 SMOKING: ICD-10-CM

## 2024-09-29 LAB
ALBUMIN SERPL BCG-MCNC: 3 G/DL (ref 3.5–5)
ALBUMIN SERPL BCG-MCNC: 3.8 G/DL (ref 3.5–5)
ALBUMIN SERPL BCG-MCNC: 4.1 G/DL (ref 3.5–5)
ALP SERPL-CCNC: 56 U/L (ref 34–104)
ALP SERPL-CCNC: 70 U/L (ref 34–104)
ALP SERPL-CCNC: 79 U/L (ref 34–104)
ALT SERPL W P-5'-P-CCNC: 43 U/L (ref 7–52)
ALT SERPL W P-5'-P-CCNC: 51 U/L (ref 7–52)
ALT SERPL W P-5'-P-CCNC: 70 U/L (ref 7–52)
ANION GAP SERPL CALCULATED.3IONS-SCNC: 12 MMOL/L (ref 4–13)
ANION GAP SERPL CALCULATED.3IONS-SCNC: 8 MMOL/L (ref 4–13)
ANION GAP SERPL CALCULATED.3IONS-SCNC: 9 MMOL/L (ref 4–13)
AST SERPL W P-5'-P-CCNC: 26 U/L (ref 13–39)
AST SERPL W P-5'-P-CCNC: 28 U/L (ref 13–39)
AST SERPL W P-5'-P-CCNC: 45 U/L (ref 13–39)
BASOPHILS # BLD AUTO: 0.01 THOUSANDS/ÂΜL (ref 0–0.1)
BASOPHILS # BLD AUTO: 0.02 THOUSANDS/ÂΜL (ref 0–0.1)
BASOPHILS # BLD AUTO: 0.02 THOUSANDS/ÂΜL (ref 0–0.1)
BASOPHILS NFR BLD AUTO: 0 % (ref 0–1)
BILIRUB SERPL-MCNC: 0.75 MG/DL (ref 0.2–1)
BILIRUB SERPL-MCNC: 0.94 MG/DL (ref 0.2–1)
BILIRUB SERPL-MCNC: 1.12 MG/DL (ref 0.2–1)
BUN SERPL-MCNC: 12 MG/DL (ref 5–25)
BUN SERPL-MCNC: 13 MG/DL (ref 5–25)
BUN SERPL-MCNC: 14 MG/DL (ref 5–25)
CALCIUM ALBUM COR SERPL-MCNC: 8.2 MG/DL (ref 8.3–10.1)
CALCIUM SERPL-MCNC: 10 MG/DL (ref 8.4–10.2)
CALCIUM SERPL-MCNC: 7.4 MG/DL (ref 8.4–10.2)
CALCIUM SERPL-MCNC: 9.6 MG/DL (ref 8.4–10.2)
CHLORIDE SERPL-SCNC: 95 MMOL/L (ref 96–108)
CHLORIDE SERPL-SCNC: 95 MMOL/L (ref 96–108)
CHLORIDE SERPL-SCNC: 98 MMOL/L (ref 96–108)
CO2 SERPL-SCNC: 27 MMOL/L (ref 21–32)
CO2 SERPL-SCNC: 33 MMOL/L (ref 21–32)
CO2 SERPL-SCNC: 35 MMOL/L (ref 21–32)
CREAT SERPL-MCNC: 0.5 MG/DL (ref 0.6–1.3)
CREAT SERPL-MCNC: 0.75 MG/DL (ref 0.6–1.3)
CREAT SERPL-MCNC: 0.84 MG/DL (ref 0.6–1.3)
EOSINOPHIL # BLD AUTO: 0.01 THOUSAND/ÂΜL (ref 0–0.61)
EOSINOPHIL NFR BLD AUTO: 0 % (ref 0–6)
ERYTHROCYTE [DISTWIDTH] IN BLOOD BY AUTOMATED COUNT: 13.3 % (ref 11.6–15.1)
ERYTHROCYTE [DISTWIDTH] IN BLOOD BY AUTOMATED COUNT: 13.5 % (ref 11.6–15.1)
ERYTHROCYTE [DISTWIDTH] IN BLOOD BY AUTOMATED COUNT: 13.6 % (ref 11.6–15.1)
GFR SERPL CREATININE-BSD FRML MDRD: 114 ML/MIN/1.73SQ M
GFR SERPL CREATININE-BSD FRML MDRD: 119 ML/MIN/1.73SQ M
GFR SERPL CREATININE-BSD FRML MDRD: 141 ML/MIN/1.73SQ M
GLUCOSE SERPL-MCNC: 105 MG/DL (ref 65–140)
GLUCOSE SERPL-MCNC: 113 MG/DL (ref 65–140)
GLUCOSE SERPL-MCNC: 92 MG/DL (ref 65–140)
HCT VFR BLD AUTO: 39.3 % (ref 36.5–49.3)
HCT VFR BLD AUTO: 44.9 % (ref 36.5–49.3)
HCT VFR BLD AUTO: 46.8 % (ref 36.5–49.3)
HGB BLD-MCNC: 13 G/DL (ref 12–17)
HGB BLD-MCNC: 14.8 G/DL (ref 12–17)
HGB BLD-MCNC: 15.7 G/DL (ref 12–17)
HOLD SPECIMEN: NORMAL
IMM GRANULOCYTES # BLD AUTO: 0.02 THOUSAND/UL (ref 0–0.2)
IMM GRANULOCYTES # BLD AUTO: 0.02 THOUSAND/UL (ref 0–0.2)
IMM GRANULOCYTES # BLD AUTO: 0.03 THOUSAND/UL (ref 0–0.2)
IMM GRANULOCYTES NFR BLD AUTO: 0 % (ref 0–2)
LIPASE SERPL-CCNC: 19 U/L (ref 11–82)
LIPASE SERPL-CCNC: 20 U/L (ref 11–82)
LYMPHOCYTES # BLD AUTO: 0.96 THOUSANDS/ÂΜL (ref 0.6–4.47)
LYMPHOCYTES # BLD AUTO: 0.98 THOUSANDS/ÂΜL (ref 0.6–4.47)
LYMPHOCYTES # BLD AUTO: 1.06 THOUSANDS/ÂΜL (ref 0.6–4.47)
LYMPHOCYTES NFR BLD AUTO: 12 % (ref 14–44)
LYMPHOCYTES NFR BLD AUTO: 13 % (ref 14–44)
LYMPHOCYTES NFR BLD AUTO: 15 % (ref 14–44)
MAGNESIUM SERPL-MCNC: 1.8 MG/DL (ref 1.9–2.7)
MAGNESIUM SERPL-MCNC: 2.2 MG/DL (ref 1.9–2.7)
MCH RBC QN AUTO: 28 PG (ref 26.8–34.3)
MCH RBC QN AUTO: 28 PG (ref 26.8–34.3)
MCH RBC QN AUTO: 28.5 PG (ref 26.8–34.3)
MCHC RBC AUTO-ENTMCNC: 33 G/DL (ref 31.4–37.4)
MCHC RBC AUTO-ENTMCNC: 33.1 G/DL (ref 31.4–37.4)
MCHC RBC AUTO-ENTMCNC: 33.5 G/DL (ref 31.4–37.4)
MCV RBC AUTO: 85 FL (ref 82–98)
MONOCYTES # BLD AUTO: 0.41 THOUSAND/ÂΜL (ref 0.17–1.22)
MONOCYTES # BLD AUTO: 0.56 THOUSAND/ÂΜL (ref 0.17–1.22)
MONOCYTES # BLD AUTO: 0.57 THOUSAND/ÂΜL (ref 0.17–1.22)
MONOCYTES NFR BLD AUTO: 6 % (ref 4–12)
MONOCYTES NFR BLD AUTO: 6 % (ref 4–12)
MONOCYTES NFR BLD AUTO: 7 % (ref 4–12)
NEUTROPHILS # BLD AUTO: 5.08 THOUSANDS/ÂΜL (ref 1.85–7.62)
NEUTROPHILS # BLD AUTO: 6.12 THOUSANDS/ÂΜL (ref 1.85–7.62)
NEUTROPHILS # BLD AUTO: 7.17 THOUSANDS/ÂΜL (ref 1.85–7.62)
NEUTS SEG NFR BLD AUTO: 79 % (ref 43–75)
NEUTS SEG NFR BLD AUTO: 80 % (ref 43–75)
NEUTS SEG NFR BLD AUTO: 82 % (ref 43–75)
NRBC BLD AUTO-RTO: 0 /100 WBCS
PHOSPHATE SERPL-MCNC: 2.4 MG/DL (ref 2.7–4.5)
PLATELET # BLD AUTO: 160 THOUSANDS/UL (ref 149–390)
PLATELET # BLD AUTO: 185 THOUSANDS/UL (ref 149–390)
PLATELET # BLD AUTO: 189 THOUSANDS/UL (ref 149–390)
PMV BLD AUTO: 9.2 FL (ref 8.9–12.7)
PMV BLD AUTO: 9.5 FL (ref 8.9–12.7)
PMV BLD AUTO: 9.7 FL (ref 8.9–12.7)
POTASSIUM SERPL-SCNC: 3.8 MMOL/L (ref 3.5–5.3)
POTASSIUM SERPL-SCNC: 3.9 MMOL/L (ref 3.5–5.3)
POTASSIUM SERPL-SCNC: 4.2 MMOL/L (ref 3.5–5.3)
PROT SERPL-MCNC: 5.3 G/DL (ref 6.4–8.4)
PROT SERPL-MCNC: 7.2 G/DL (ref 6.4–8.4)
PROT SERPL-MCNC: 7.6 G/DL (ref 6.4–8.4)
RBC # BLD AUTO: 4.65 MILLION/UL (ref 3.88–5.62)
RBC # BLD AUTO: 5.28 MILLION/UL (ref 3.88–5.62)
RBC # BLD AUTO: 5.5 MILLION/UL (ref 3.88–5.62)
SODIUM SERPL-SCNC: 137 MMOL/L (ref 135–147)
SODIUM SERPL-SCNC: 137 MMOL/L (ref 135–147)
SODIUM SERPL-SCNC: 138 MMOL/L (ref 135–147)
WBC # BLD AUTO: 6.52 THOUSAND/UL (ref 4.31–10.16)
WBC # BLD AUTO: 7.69 THOUSAND/UL (ref 4.31–10.16)
WBC # BLD AUTO: 8.85 THOUSAND/UL (ref 4.31–10.16)

## 2024-09-29 PROCEDURE — 83735 ASSAY OF MAGNESIUM: CPT

## 2024-09-29 PROCEDURE — 96361 HYDRATE IV INFUSION ADD-ON: CPT

## 2024-09-29 PROCEDURE — 74177 CT ABD & PELVIS W/CONTRAST: CPT

## 2024-09-29 PROCEDURE — 99223 1ST HOSP IP/OBS HIGH 75: CPT | Performed by: SURGERY

## 2024-09-29 PROCEDURE — 84100 ASSAY OF PHOSPHORUS: CPT

## 2024-09-29 PROCEDURE — 83690 ASSAY OF LIPASE: CPT | Performed by: EMERGENCY MEDICINE

## 2024-09-29 PROCEDURE — 80053 COMPREHEN METABOLIC PANEL: CPT | Performed by: EMERGENCY MEDICINE

## 2024-09-29 PROCEDURE — 99285 EMERGENCY DEPT VISIT HI MDM: CPT | Performed by: EMERGENCY MEDICINE

## 2024-09-29 PROCEDURE — 80053 COMPREHEN METABOLIC PANEL: CPT

## 2024-09-29 PROCEDURE — 36415 COLL VENOUS BLD VENIPUNCTURE: CPT | Performed by: EMERGENCY MEDICINE

## 2024-09-29 PROCEDURE — 96375 TX/PRO/DX INJ NEW DRUG ADDON: CPT

## 2024-09-29 PROCEDURE — 99222 1ST HOSP IP/OBS MODERATE 55: CPT

## 2024-09-29 PROCEDURE — 96365 THER/PROPH/DIAG IV INF INIT: CPT

## 2024-09-29 PROCEDURE — 85025 COMPLETE CBC W/AUTO DIFF WBC: CPT

## 2024-09-29 PROCEDURE — 85025 COMPLETE CBC W/AUTO DIFF WBC: CPT | Performed by: EMERGENCY MEDICINE

## 2024-09-29 PROCEDURE — 99283 EMERGENCY DEPT VISIT LOW MDM: CPT

## 2024-09-29 PROCEDURE — 96374 THER/PROPH/DIAG INJ IV PUSH: CPT

## 2024-09-29 RX ORDER — HEPARIN SODIUM 5000 [USP'U]/ML
5000 INJECTION, SOLUTION INTRAVENOUS; SUBCUTANEOUS EVERY 8 HOURS SCHEDULED
Status: DISCONTINUED | OUTPATIENT
Start: 2024-09-29 | End: 2024-09-30 | Stop reason: HOSPADM

## 2024-09-29 RX ORDER — SODIUM CHLORIDE, SODIUM GLUCONATE, SODIUM ACETATE, POTASSIUM CHLORIDE, MAGNESIUM CHLORIDE, SODIUM PHOSPHATE, DIBASIC, AND POTASSIUM PHOSPHATE .53; .5; .37; .037; .03; .012; .00082 G/100ML; G/100ML; G/100ML; G/100ML; G/100ML; G/100ML; G/100ML
84 INJECTION, SOLUTION INTRAVENOUS CONTINUOUS
Status: DISCONTINUED | OUTPATIENT
Start: 2024-09-29 | End: 2024-09-29 | Stop reason: HOSPADM

## 2024-09-29 RX ORDER — ONDANSETRON 2 MG/ML
4 INJECTION INTRAMUSCULAR; INTRAVENOUS EVERY 6 HOURS PRN
Status: DISCONTINUED | OUTPATIENT
Start: 2024-09-29 | End: 2024-09-30 | Stop reason: HOSPADM

## 2024-09-29 RX ORDER — NICOTINE 21 MG/24HR
1 PATCH, TRANSDERMAL 24 HOURS TRANSDERMAL DAILY
Status: DISCONTINUED | OUTPATIENT
Start: 2024-09-30 | End: 2024-09-30 | Stop reason: HOSPADM

## 2024-09-29 RX ORDER — KETOROLAC TROMETHAMINE 30 MG/ML
15 INJECTION, SOLUTION INTRAMUSCULAR; INTRAVENOUS EVERY 6 HOURS PRN
Status: DISCONTINUED | OUTPATIENT
Start: 2024-09-29 | End: 2024-09-29 | Stop reason: HOSPADM

## 2024-09-29 RX ORDER — ONDANSETRON 2 MG/ML
4 INJECTION INTRAMUSCULAR; INTRAVENOUS EVERY 6 HOURS PRN
Status: DISCONTINUED | OUTPATIENT
Start: 2024-09-29 | End: 2024-09-29 | Stop reason: HOSPADM

## 2024-09-29 RX ORDER — SUCRALFATE 1 G/1
1 TABLET ORAL ONCE
Status: COMPLETED | OUTPATIENT
Start: 2024-09-29 | End: 2024-09-29

## 2024-09-29 RX ORDER — LANOLIN ALCOHOL/MO/W.PET/CERES
3 CREAM (GRAM) TOPICAL
Status: DISCONTINUED | OUTPATIENT
Start: 2024-09-29 | End: 2024-09-29 | Stop reason: HOSPADM

## 2024-09-29 RX ORDER — FAMOTIDINE 10 MG/ML
20 INJECTION, SOLUTION INTRAVENOUS ONCE
Status: COMPLETED | OUTPATIENT
Start: 2024-09-29 | End: 2024-09-29

## 2024-09-29 RX ORDER — ACETAMINOPHEN 325 MG/1
975 TABLET ORAL EVERY 6 HOURS
Status: DISCONTINUED | OUTPATIENT
Start: 2024-09-29 | End: 2024-09-30

## 2024-09-29 RX ORDER — PANTOPRAZOLE SODIUM 40 MG/10ML
40 INJECTION, POWDER, LYOPHILIZED, FOR SOLUTION INTRAVENOUS
Status: DISCONTINUED | OUTPATIENT
Start: 2024-09-29 | End: 2024-09-29 | Stop reason: HOSPADM

## 2024-09-29 RX ORDER — ACETAMINOPHEN 325 MG/1
975 TABLET ORAL EVERY 6 HOURS
Status: DISCONTINUED | OUTPATIENT
Start: 2024-09-29 | End: 2024-09-29 | Stop reason: HOSPADM

## 2024-09-29 RX ORDER — LANOLIN ALCOHOL/MO/W.PET/CERES
3 CREAM (GRAM) TOPICAL
Status: DISCONTINUED | OUTPATIENT
Start: 2024-09-29 | End: 2024-09-30 | Stop reason: HOSPADM

## 2024-09-29 RX ORDER — ONDANSETRON 2 MG/ML
4 INJECTION INTRAMUSCULAR; INTRAVENOUS ONCE
Status: COMPLETED | OUTPATIENT
Start: 2024-09-29 | End: 2024-09-29

## 2024-09-29 RX ORDER — HEPARIN SODIUM 5000 [USP'U]/ML
5000 INJECTION, SOLUTION INTRAVENOUS; SUBCUTANEOUS EVERY 8 HOURS SCHEDULED
Status: DISCONTINUED | OUTPATIENT
Start: 2024-09-29 | End: 2024-09-29 | Stop reason: HOSPADM

## 2024-09-29 RX ORDER — KETOROLAC TROMETHAMINE 30 MG/ML
15 INJECTION, SOLUTION INTRAMUSCULAR; INTRAVENOUS ONCE
Status: COMPLETED | OUTPATIENT
Start: 2024-09-29 | End: 2024-09-29

## 2024-09-29 RX ORDER — ACETAMINOPHEN 10 MG/ML
1000 INJECTION, SOLUTION INTRAVENOUS ONCE
Status: COMPLETED | OUTPATIENT
Start: 2024-09-29 | End: 2024-09-29

## 2024-09-29 RX ORDER — MAGNESIUM HYDROXIDE/ALUMINUM HYDROXICE/SIMETHICONE 120; 1200; 1200 MG/30ML; MG/30ML; MG/30ML
30 SUSPENSION ORAL ONCE
Status: COMPLETED | OUTPATIENT
Start: 2024-09-29 | End: 2024-09-29

## 2024-09-29 RX ORDER — IBUPROFEN 600 MG/1
600 TABLET, FILM COATED ORAL EVERY 6 HOURS PRN
Status: DISCONTINUED | OUTPATIENT
Start: 2024-09-29 | End: 2024-09-29 | Stop reason: HOSPADM

## 2024-09-29 RX ORDER — NICOTINE 21 MG/24HR
1 PATCH, TRANSDERMAL 24 HOURS TRANSDERMAL DAILY
Status: DISCONTINUED | OUTPATIENT
Start: 2024-09-29 | End: 2024-09-29 | Stop reason: HOSPADM

## 2024-09-29 RX ORDER — SODIUM CHLORIDE, SODIUM GLUCONATE, SODIUM ACETATE, POTASSIUM CHLORIDE, MAGNESIUM CHLORIDE, SODIUM PHOSPHATE, DIBASIC, AND POTASSIUM PHOSPHATE .53; .5; .37; .037; .03; .012; .00082 G/100ML; G/100ML; G/100ML; G/100ML; G/100ML; G/100ML; G/100ML
125 INJECTION, SOLUTION INTRAVENOUS CONTINUOUS
Status: DISCONTINUED | OUTPATIENT
Start: 2024-09-29 | End: 2024-09-30 | Stop reason: HOSPADM

## 2024-09-29 RX ORDER — PANTOPRAZOLE SODIUM 40 MG/10ML
40 INJECTION, POWDER, LYOPHILIZED, FOR SOLUTION INTRAVENOUS
Status: DISCONTINUED | OUTPATIENT
Start: 2024-09-30 | End: 2024-09-30 | Stop reason: HOSPADM

## 2024-09-29 RX ORDER — METOCLOPRAMIDE HYDROCHLORIDE 5 MG/ML
10 INJECTION INTRAMUSCULAR; INTRAVENOUS EVERY 6 HOURS SCHEDULED
Status: DISCONTINUED | OUTPATIENT
Start: 2024-09-29 | End: 2024-09-29 | Stop reason: HOSPADM

## 2024-09-29 RX ADMIN — SUCRALFATE 1 G: 1 TABLET ORAL at 00:52

## 2024-09-29 RX ADMIN — ACETAMINOPHEN 1000 MG: 10 INJECTION INTRAVENOUS at 01:32

## 2024-09-29 RX ADMIN — ACETAMINOPHEN 975 MG: 325 TABLET, FILM COATED ORAL at 19:27

## 2024-09-29 RX ADMIN — KETOROLAC TROMETHAMINE 15 MG: 30 INJECTION, SOLUTION INTRAMUSCULAR at 15:36

## 2024-09-29 RX ADMIN — SODIUM CHLORIDE 1000 ML: 0.9 INJECTION, SOLUTION INTRAVENOUS at 15:33

## 2024-09-29 RX ADMIN — FAMOTIDINE 20 MG: 10 INJECTION INTRAVENOUS at 00:54

## 2024-09-29 RX ADMIN — ALUMINUM HYDROXIDE, MAGNESIUM HYDROXIDE, DIMETHICONE 30 ML: 200; 200; 20 LIQUID ORAL at 00:53

## 2024-09-29 RX ADMIN — IOHEXOL 85 ML: 350 INJECTION, SOLUTION INTRAVENOUS at 00:34

## 2024-09-29 RX ADMIN — POTASSIUM PHOSPHATE, MONOBASIC AND POTASSIUM PHOSPHATE, DIBASIC 30 MMOL: 224; 236 INJECTION, SOLUTION, CONCENTRATE INTRAVENOUS at 11:16

## 2024-09-29 RX ADMIN — METOCLOPRAMIDE 10 MG: 5 INJECTION, SOLUTION INTRAMUSCULAR; INTRAVENOUS at 11:17

## 2024-09-29 RX ADMIN — ONDANSETRON 4 MG: 2 INJECTION INTRAMUSCULAR; INTRAVENOUS at 15:34

## 2024-09-29 RX ADMIN — HEPARIN SODIUM 5000 UNITS: 5000 INJECTION INTRAVENOUS; SUBCUTANEOUS at 06:41

## 2024-09-29 RX ADMIN — Medication 3 MG: at 21:21

## 2024-09-29 RX ADMIN — SODIUM CHLORIDE, SODIUM GLUCONATE, SODIUM ACETATE, POTASSIUM CHLORIDE, MAGNESIUM CHLORIDE, SODIUM PHOSPHATE, DIBASIC, AND POTASSIUM PHOSPHATE 100 ML/HR: .53; .5; .37; .037; .03; .012; .00082 INJECTION, SOLUTION INTRAVENOUS at 02:04

## 2024-09-29 RX ADMIN — ACETAMINOPHEN 325MG 975 MG: 325 TABLET ORAL at 06:41

## 2024-09-29 RX ADMIN — HEPARIN SODIUM 5000 UNITS: 5000 INJECTION, SOLUTION INTRAVENOUS; SUBCUTANEOUS at 21:22

## 2024-09-29 RX ADMIN — PANTOPRAZOLE SODIUM 40 MG: 40 INJECTION, POWDER, FOR SOLUTION INTRAVENOUS at 09:05

## 2024-09-29 RX ADMIN — SODIUM CHLORIDE, SODIUM GLUCONATE, SODIUM ACETATE, POTASSIUM CHLORIDE, MAGNESIUM CHLORIDE, SODIUM PHOSPHATE, DIBASIC, AND POTASSIUM PHOSPHATE 125 ML/HR: .53; .5; .37; .037; .03; .012; .00082 INJECTION, SOLUTION INTRAVENOUS at 19:28

## 2024-09-29 NOTE — ED ATTENDING ATTESTATION
9/28/2024  I, Zachary Corral MD, saw and evaluated the patient. I have discussed the patient with the resident/non-physician practitioner and agree with the resident's/non-physician practitioner's findings, Plan of Care, and MDM as documented in the resident's/non-physician practitioner's note, except where noted. All available labs and Radiology studies were reviewed.  I was present for key portions of any procedure(s) performed by the resident/non-physician practitioner and I was immediately available to provide assistance.       At this point I agree with the current assessment done in the Emergency Department.  I have conducted an independent evaluation of this patient a history and physical is as follows: Patient presents for evaluation via EMS for nausea, vomiting and diffuse abdominal pain.  Had a cholecystectomy performed at [facility] 2 days ago.  States he stayed overnight and was discharged.  Patient states he has not had a bowel movement since surgery.  Denies any fevers or chills.  Denies any chest pain or shortness of breath.    Labs with slightly elevated AST and ALT over baseline.  CT scan most consistent with ileus.  Multimodal pain relief.     General surgery saw and evaluated patient, admit their service.    Results Reviewed       Procedure Component Value Units Date/Time    [place] draw [[id number]] Collected: 09/28/24 2332    Lab Status: Final result Specimen: Blood from Arm, Right Updated: 09/29/24 0101    Narrative:      The following orders were created for panel order [place] draw.  Procedure                               Abnormality         Status                     ---------                               -----------         ------                     Light Blue Top on hold[[id number]]                           Final result               Green / Yellow tube on hold[[id number]]                      Final result               Green / Black tube on hold[[id number]]                        Final result               Lavender Top 3 ml on hold[[id number]]                        Final result                 Please view results for these tests on the individual orders.    CBC and differential [[id number]]  (Abnormal) Collected: 09/28/24 2332    Lab Status: Final result Specimen: Blood from Arm, Right Updated: 09/29/24 0020     WBC 8.85 Thousand/uL      RBC 5.50 Million/uL      Hemoglobin 15.7 g/dL      Hematocrit 46.8 %      MCV 85 fL      MCH 28.5 pg      MCHC 33.5 g/dL      RDW 13.5 %      MPV 9.5 fL      Platelets 185 Thousands/uL      nRBC 0 /100 WBCs      Segmented % 82 %      Immature Grans % 0 %      Lymphocytes % 12 %      Monocytes % 6 %      Eosinophils Relative 0 %      Basophils Relative 0 %      Absolute Neutrophils 7.17 Thousands/µL      Absolute Immature Grans 0.03 Thousand/uL      Absolute Lymphocytes 1.06 Thousands/µL      Absolute Monocytes 0.56 Thousand/µL      Eosinophils Absolute 0.01 Thousand/µL      Basophils Absolute 0.02 Thousands/µL     Comprehensive metabolic panel [[id number]]  (Abnormal) Collected: 09/28/24 2332    Lab Status: Final result Specimen: Blood from Arm, Right Updated: 09/29/24 0018     Sodium 138 mmol/L      Potassium 4.2 mmol/L      Chloride 95 mmol/L      CO2 35 mmol/L      ANION GAP 8 mmol/L      BUN 13 mg/dL      Creatinine 0.84 mg/dL      Glucose 113 mg/dL      Calcium 10.0 mg/dL      AST 45 U/L      ALT 70 U/L      Alkaline Phosphatase 79 U/L      Total Protein 7.6 g/dL      Albumin 4.1 g/dL      Total Bilirubin 1.12 mg/dL      eGFR 114 ml/min/1.73sq m     Narrative:      National Kidney Disease Foundation guidelines for Chronic Kidney Disease (CKD):     Stage 1 with normal or high GFR (GFR > 90 mL/min/1.73 square meters)    Stage 2 Mild CKD (GFR = 60-89 mL/min/1.73 square meters)    Stage 3A Moderate CKD (GFR = 45-59 mL/min/1.73 square meters)    Stage 3B Moderate CKD (GFR = 30-44 mL/min/1.73 square meters)    Stage 4 Severe CKD (GFR = 15-29  mL/min/1.73 square meters)    Stage 5 End Stage CKD (GFR <15 mL/min/1.73 square meters)  Note: GFR calculation is accurate only with a steady state creatinine    Lipase [[id number]]  (Normal) Collected: 09/28/24 9322    Lab Status: Final result Specimen: Blood from Arm, Right Updated: 09/29/24 0018     Lipase 19 u/L           CT abdomen pelvis with contrast   Final Result by Alcides Townsend DO (09/29 0110)      Multiple dilated fluid-filled small bowel loops throughout the abdomen without discrete transition point, most consistent with ileus.      Trace bilateral pleural effusion.      The study was marked in EPIC for immediate notification.      Workstation performed: [id number]               ED Course         Critical Care Time  Procedures

## 2024-09-29 NOTE — H&P
H&P - Surgery-General   Name: Yuval Matute 34 y.o. male I MRN: [id number]  Unit/Bed#: ED-40 I Date of Admission: 9/28/2024   Date of Service: 9/29/2024 I Hospital Day: 0     Assessment & Plan  Postoperative ileus (HCC)  Patient is a 34-year-old male with history of COPD, Asperger syndrome, who presents on postop day 2 from lap cholecystectomy with abdominal pain, nausea, and vomiting.  CT scan demonstrates multiple fluid-filled small bowel loops with no discrete transition point consistent with an ileus.  Patient is currently hemodynamically stable and is not currently vomiting.    -N.p.o. with sips of clears  -IV fluid resuscitation  -Pain and nausea control as needed  -Out of bed and ambulate  -Will consider NG tube if patient has further episodes of vomiting.  -DVT prophylaxis: [facility]    History of Present Illness   Yuval Matute is a 34 y.o. male with history of COPD, Asperger's syndrome who presents on postop day 2 from lap cholecystectomy with abdominal pain, nausea, vomiting.    Patient reports that he had a laparoscopic cholecystectomy on Thursday at [facility].  He reports that he stayed overnight after the procedure and the next day began having nausea, vomiting.  He also reports having abdominal pain which is now rated as an 8 out of 10.  He does report having 6 episodes of emesis today.  He has not passed any bowel movements since Tuesday before his surgery.  He does report he is passing some flatus.  He is having some chills and mild shortness of breath but otherwise denies fevers, chest pain.  He is not tolerating any p.o. intake.  He is a surgical history of umbilical hernia repair as a child and laparoscopic proctopexy.     CT scan in the emergency room demonstrated multiple dilated fluid-filled small bowel loops throughout the abdomen without a discrete transition point, most consistent with an ileus.  He also has trace bilateral pleural effusions.  There was a small volume of right upper  quadrant free air however this was consistent with recent surgery.  His labs are significant for white blood cell count of 8.85 hemoglobin of 15.7, creatinine of 0.84, and mild elevation of AST and ALT to 45 and 70 respectively, T. bili of 1.12.    He is a current smoker with 1 pack/day for the last 16 years.  He does not drink any alcohol.  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    Review of Systems   Constitutional:  Positive for chills. Negative for fever.   HENT:  Negative for trouble swallowing.    Eyes:  Negative for visual disturbance.   Respiratory:  Positive for shortness of breath.    Cardiovascular:  Negative for chest pain.   Gastrointestinal:  Positive for abdominal pain, constipation, nausea and vomiting.   Genitourinary:  Negative for difficulty urinating.   Musculoskeletal:  Negative for joint swelling.   Skin:  Negative for color change.   Neurological:  Positive for headaches.   Psychiatric/Behavioral:  Negative for behavioral problems.      I have reviewed the patient's PMH, PSH, Social History, Family History, Meds, and Allergies    Objective      Temp:  [98.4 °F (36.9 °C)] 98.4 °F (36.9 °C)  HR:  [73-83] 75  Resp:  [16-20] 20  BP: (116-127)/(80-86) 116/86  O2 Device: None (Room air)          I/O         09/27 0701  09/28 0700 09/28 0701  09/29 0700    IV Piggyback  1100    Total Intake  1100    Net  +1100                Lines/Drains/Airways       Active Status       None                  Physical Exam  Constitutional:       Appearance: Normal appearance.   HENT:      Head: Normocephalic and atraumatic.      Right Ear: External ear normal.      Left Ear: External ear normal.      Nose: Nose normal.   Eyes:      Conjunctiva/sclera: Conjunctivae normal.   Cardiovascular:      Rate and Rhythm: Normal rate.      Comments: Appears well-perfused  Pulmonary:      Effort: Pulmonary effort is normal. No respiratory distress.   Abdominal:      General: Abdomen is  flat. There is distension (Mild distention).      Palpations: Abdomen is soft.      Tenderness: There is abdominal tenderness (Appropriate adams-incisional tenderness.). There is guarding (Has some voluntary guarding adams-incisionally).   Musculoskeletal:         General: Normal range of motion.      Cervical back: Normal range of motion.   Skin:     General: Skin is warm and dry.   Neurological:      General: No focal deficit present.      Mental Status: He is alert and oriented to person, place, and time.   Psychiatric:         Mood and Affect: Mood normal.         Behavior: Behavior normal.          Lab Results: I have reviewed the following results: CBC/BMP:   .     09/28/24  2332   WBC 8.85   HGB 15.7   HCT 46.8      SODIUM 138   K 4.2   CL 95*   CO2 35*   BUN 13   CREATININE 0.84   GLUC 113    , LFTs:   .     09/28/24  2332   AST 45*   ALT 70*   ALB 4.1   TBILI 1.12*   ALKPHOS 79      Imaging Review: Reviewed radiology reports from this admission including: CT abdomen/pelvis.  Other Studies: No additional pertinent studies reviewed.    VTE Pharmacologic Prophylaxis: Heparin  VTE Mechanical Prophylaxis: sequential compression device    Administrative Statements   I have spent a total time of 25 minutes in caring for this patient on the day of the visit/encounter including Diagnostic results and Risks and benefits of tx options.

## 2024-09-29 NOTE — DISCHARGE INSTR - AVS FIRST PAGE
[facility] Surgical Discharge Instructions    Please follow-up with your surgeon and primary care physician.    Activity:  - Regular activity (working around the house, walking up/down stairs, etc.) is ok and encouraged  - No driving until you are no longer using narcotic pain medications    Diet:    - Slowly advance your diet from clears to clears/toast/crackers/ to low-fiber diet. If nauseous/vomiting, downgrade to prior diet.    Medications:    - Continue your pre-hospital medication regimen after discharge unless you were instructed otherwise. Please refer to your discharge medication list for further details.  - For the first few days following your procedure, take Tylenol to provide a solid baseline pain control and use the stronger pain medications (if provided) for more severe pain    Additional Instructions:  - If you have any questions or concerns after discharge please do not hesitate to contact our office, we would be happy to provide clarification      Call our office or return to the Emergency Room if you develop a fever greater than 101F, chills, persistent nausea/vomiting, worsening/uncontrollable pain, and/or increasing redness or purulent/foul smelling drainage from your incision(s)

## 2024-09-29 NOTE — ASSESSMENT & PLAN NOTE
Patient is a 34-year-old male with history of COPD, Asperger syndrome, who presents on postop day 2 from lap cholecystectomy with abdominal pain, nausea, and vomiting.  CT scan demonstrates multiple fluid-filled small bowel loops with no discrete transition point consistent with an ileus.  Patient is currently hemodynamically stable and is not currently vomiting.    -N.p.o. with sips of clears  -IV fluid resuscitation  -Pain and nausea control as needed  -Out of bed and ambulate  -Will consider NG tube if patient has further episodes of vomiting.  -DVT prophylaxis: [facility]

## 2024-09-29 NOTE — ED PROVIDER NOTES
Final diagnoses:   Postoperative ileus (HCC)   Nausea and vomiting   Abdominal pain, unspecified abdominal location     ED Disposition       ED Disposition   Admit    Condition   Stable    Date/Time   Sun Sep 29, 2024  4:36 PM    Comment   Case was discussed with DOUG and the patient's admission status was agreed to be Admission Status: inpatient status to the service of Dr. Jordan .               Assessment & Plan       Medical Decision Making  34-year-old male with vomiting and abdominal pain. Known ileus on evaluation last night. No acute change in symptoms. Labs stable. Discussed with general surgery, recommending [facility] admission. Discussed with [facility] and admitted to their service for further evaluation and management.     Problems Addressed:  Abdominal pain, unspecified abdominal location: acute illness or injury  Nausea and vomiting: acute illness or injury  Postoperative ileus (HCC): acute illness or injury    Amount and/or Complexity of Data Reviewed  Labs: ordered. Decision-making details documented in ED Course.    Risk  Prescription drug management.  Decision regarding hospitalization.        ED Course as of 09/29/24 1706   Sun Sep 29, 2024   1541 CBC and differential(!)   1600 Comprehensive metabolic panel(!)   1600 LIPASE: 20   1637 Discussed with Dr. Collins. Recommends admission to [facility].       Medications   ketorolac (TORADOL) injection 15 mg (15 mg Intravenous Given 9/29/24 1536)   ondansetron (ZOFRAN) injection 4 mg (4 mg Intravenous Given 9/29/24 1534)   sodium chloride 0.9 % bolus 1,000 mL (0 mL Intravenous Stopped 9/29/24 1655)       ED Risk Strat Scores                                               History of Present Illness       Chief Complaint   Patient presents with    Post-op Problem     Pt presents to ED s/o gallbladder removal at  [place] Thursday. Per pt's mother who is at bedside, pt was DCd from  Friday and seen for same symptoms today at [facility]- received CT scan but signed out  "AMA. Pt presents now for vomiting, c/o vomiting and RLQ \"pinching pain\". Denies fevers.        Past Medical History:   Diagnosis Date    Asperger's syndrome     COPD (chronic obstructive pulmonary disease) (HCC)     GERD (gastroesophageal reflux disease)     Headache(784.0)     Rectal prolapse     Rectal prolapse 05/09/2018      Past Surgical History:   Procedure Laterality Date    HERNIA REPAIR      DC COLONOSCOPY FLX DX W/COLLJ SPEC WHEN PFRMD N/A 5/7/2018    Procedure: COLONOSCOPY;  Surgeon: Yvonne Cole MD;  Location: [facility];  Service: Colorectal    DC LAPAROSCOPY PROCTOPEXY PROLAPSE SIGMOID RESCJ N/A 5/8/2018    Procedure: LAPAROSCOPIC HAND-ASSIST REPAIR OF RECTAL PROLAPSE BY SACRAL  PROTOPEXY;  Surgeon: Yvonne Cole MD;  Location: [facility];  Service: Colorectal    DC SIGMOIDOSCOPY FLX DX W/COLLJ SPEC BR/WA IF PFRMD N/A 6/19/2018    Procedure: SIGMOIDOSCOPY FLEXIBLE;  Surgeon: Yvonne Cole MD;  Location: [facility];  Service: Colorectal    SURGERY SCROTAL / TESTICULAR        Family History   Family history unknown: Yes      Social History     Tobacco Use    Smoking status: Every Day     Current packs/day: 1.00     Average packs/day: 1 pack/day for 15.0 years (15.0 ttl pk-yrs)     Types: Cigarettes    Smokeless tobacco: Never   Vaping Use    Vaping status: Never Used   Substance Use Topics    Alcohol use: No    Drug use: Not Currently     Types: Methamphetamines     Comment: Daily user -- would like to quit 2-28-23      E-Cigarette/Vaping    E-Cigarette Use Never User       E-Cigarette/Vaping Substances    Nicotine No     THC No     CBD No     Flavoring No     Other No     Unknown No       I have reviewed and agree with the history as documented.     34-year-old male with history of asthma GERD syndrome, GERD who presents for evaluation of abdominal pain and vomiting.  Patient had a laparoscopic cholecystectomy on 9/26/2024 at [facility].  He was discharged the following day and " subsequently developed right sided abdominal pain, nausea, vomiting.  He presented to the emergency department at [facility] last night for his symptoms.  He was evaluated at that time and was found to have a postoperative ileus.  He was treated symptomatically and admitted to the surgery service.  He reports that he thought he was feeling better today and ultimately signed out AGAINST MEDICAL ADVICE.  Upon getting home, he had return of his vomiting.  He reports 2 episodes of nonbloody, nonbilious emesis and continued right-sided abdominal pain.  His pain feels exactly the same as when he was evaluated yesterday.  No fevers that he knows of.  No medications prior to arrival.        Review of Systems   Constitutional:  Negative for fever.   Respiratory:  Negative for shortness of breath.    Cardiovascular:  Negative for chest pain.   Gastrointestinal:  Positive for abdominal pain, nausea and vomiting. Negative for constipation and diarrhea.   Genitourinary:  Negative for dysuria, flank pain and frequency.   Musculoskeletal:  Negative for gait problem.   Skin:  Negative for rash.   Neurological:  Negative for weakness and light-headedness.   All other systems reviewed and are negative.          Objective       ED Triage Vitals [09/29/24 1459]   Temperature Pulse Blood Pressure Respirations SpO2 Patient Position - Orthostatic VS   98 °F (36.7 °C) 88 129/91 16 96 % Sitting      Temp Source Heart Rate Source BP Location FiO2 (%) Pain Score    Tympanic Monitor Right arm -- 5      Vitals      Date and Time Temp Pulse SpO2 Resp BP Pain Score FACES Pain Rating User   09/29/24 1536 -- -- -- -- -- 7 -- JLT   09/29/24 1459 98 °F (36.7 °C) 88 96 % 16 129/91 5 -- JLT            Physical Exam  Vitals and nursing note reviewed.   Constitutional:       General: He is not in acute distress.     Appearance: He is not ill-appearing.   HENT:      Head: Normocephalic and atraumatic.      Nose: Nose normal.      Mouth/Throat:      Mouth:  Oropharynx is clear and moist. Mucous membranes are moist.   Eyes:      Conjunctiva/sclera: Conjunctivae normal.   Cardiovascular:      Rate and Rhythm: Normal rate and regular rhythm.      Heart sounds: No murmur heard.     No friction rub. No gallop.   Pulmonary:      Effort: Pulmonary effort is normal.      Breath sounds: Normal breath sounds. No wheezing, rhonchi or rales.   Abdominal:      General: There is no distension.      Palpations: Abdomen is soft.      Tenderness: There is abdominal tenderness in the right upper quadrant and right lower quadrant. There is no guarding or rebound.      Comments: Surgical incisions intact, no surrounding erythema or drainage.   Musculoskeletal:         General: No swelling, tenderness or edema. Normal range of motion.      Cervical back: Normal range of motion and neck supple.   Skin:     General: Skin is warm and dry.      Coloration: Skin is not pale.      Findings: No rash.   Neurological:      General: No focal deficit present.      Mental Status: He is alert and oriented to person, place, and time.   Psychiatric:         Mood and Affect: Mood and affect normal.         Behavior: Behavior normal.         Results Reviewed       Procedure Component Value Units Date/Time    Comprehensive metabolic panel [[id number]]  (Abnormal) Collected: 09/29/24 1531    Lab Status: Final result Specimen: Blood from Arm, Right Updated: 09/29/24 1559     Sodium 137 mmol/L      Potassium 3.9 mmol/L      Chloride 95 mmol/L      CO2 33 mmol/L      ANION GAP 9 mmol/L      BUN 14 mg/dL      Creatinine 0.75 mg/dL      Glucose 105 mg/dL      Calcium 9.6 mg/dL      AST 28 U/L      ALT 51 U/L      Alkaline Phosphatase 70 U/L      Total Protein 7.2 g/dL      Albumin 3.8 g/dL      Total Bilirubin 0.94 mg/dL      eGFR 119 ml/min/1.73sq m     Narrative:      National Kidney Disease Foundation guidelines for Chronic Kidney Disease (CKD):     Stage 1 with normal or high GFR (GFR > 90 mL/min/1.73 square  meters)    Stage 2 Mild CKD (GFR = 60-89 mL/min/1.73 square meters)    Stage 3A Moderate CKD (GFR = 45-59 mL/min/1.73 square meters)    Stage 3B Moderate CKD (GFR = 30-44 mL/min/1.73 square meters)    Stage 4 Severe CKD (GFR = 15-29 mL/min/1.73 square meters)    Stage 5 End Stage CKD (GFR <15 mL/min/1.73 square meters)  Note: GFR calculation is accurate only with a steady state creatinine    Lipase [[id number]]  (Normal) Collected: 09/29/24 1531    Lab Status: Final result Specimen: Blood from Arm, Right Updated: 09/29/24 1559     Lipase 20 u/L     CBC and differential [[id number]]  (Abnormal) Collected: 09/29/24 1531    Lab Status: Final result Specimen: Blood from Arm, Right Updated: 09/29/24 1541     WBC 7.69 Thousand/uL      RBC 5.28 Million/uL      Hemoglobin 14.8 g/dL      Hematocrit 44.9 %      MCV 85 fL      MCH 28.0 pg      MCHC 33.0 g/dL      RDW 13.3 %      MPV 9.2 fL      Platelets 189 Thousands/uL      nRBC 0 /100 WBCs      Segmented % 80 %      Immature Grans % 0 %      Lymphocytes % 13 %      Monocytes % 7 %      Eosinophils Relative 0 %      Basophils Relative 0 %      Absolute Neutrophils 6.12 Thousands/µL      Absolute Immature Grans 0.02 Thousand/uL      Absolute Lymphocytes 0.96 Thousands/µL      Absolute Monocytes 0.57 Thousand/µL      Eosinophils Absolute 0.01 Thousand/µL      Basophils Absolute 0.01 Thousands/µL             No orders to display       Procedures    ED Medication and Procedure Management   Prior to Admission Medications   Prescriptions Last Dose Informant Patient Reported? Taking?   acyclovir (Zovirax) 800 mg tablet   No No   Sig: Take 1 tablet (800 mg total) by mouth 2 (two) times a day for 10 days   Patient not taking: Reported on 7/26/2022   busPIRone (BUSPAR) 10 mg tablet  Self Yes No   Sig: Take 1 tablet by mouth 3 (three) times a day   diphenhydrAMINE HCl (ELIAN-SELTZER PLUS ALLERGY PO)  Self Yes No   Sig: Take by mouth   ondansetron (ZOFRAN) 4 mg tablet   No No   Sig: Take 1  tablet (4 mg total) by mouth every 6 (six) hours as needed for nausea or vomiting for up to 7 days      Facility-Administered Medications: None     Patient's Medications   Discharge Prescriptions    No medications on file     No discharge procedures on file.  ED SEPSIS DOCUMENTATION   Time reflects when diagnosis was documented in both MDM as applicable and the Disposition within this note       Time User Action Codes Description Comment    9/29/2024  4:36 PM Park Martínez [K91.89,  K56.7] Postoperative ileus (HCC)     9/29/2024  4:36 PM Park Martínez [R11.2] Nausea and vomiting     9/29/2024  4:37 PM Park Martínez [R10.9] Abdominal pain, unspecified abdominal location                  Park Martínez MD  09/29/24 0638

## 2024-09-29 NOTE — QUICK NOTE
Patient left AMA. Was dressed and walking out the door when I was able to speak with him. Explained risks of leaving AMA including persistent ileus, obstruction, nausea, vomiting, leak, peritonitis, death. Patient understood but was adamant about leaving. Signed AMA paperwork.

## 2024-09-29 NOTE — PLAN OF CARE

## 2024-09-29 NOTE — H&P
"H&P - Internal Medicine   Name: Yuval Matute 34 y.o. male I MRN: [id number]  Unit/Bed#: ED OVR 22 I Date of Admission: 9/29/2024   Date of Service: 9/29/2024 I Hospital Day: 0     Assessment & Plan  Rectal prolapse  History of rectal prolapse with proctopexy in past. No current complaints.  Postoperative ileus (HCC)  Presents with post-op ileus after lap mei on 9/26. Returned to [facility] after AMA from [facility]. S/p 1 dose reglan at [place]. CT with ileus, no transition point. Passing flatus and small liquid bowel movement -- improving ileus. Still has N/V.  IV zofran for nausea/vomiting  Discussed importance of ambulation for ileus  Continue only on minimal clears. No solid food yet.   IVF on isolyte 150 cc/hr  Avoid any opioid use to prevent worsening of ileus  General surgery consulted    Autism spectrum  History of autism spectrum disorder.     Code Status: Prior  Admission Status: INPATIENT   Disposition: Patient requires Med Surg        History of Present Illness   Patient is a 34M with PMH of autism spectrum, POD3 s/p Lap cholecystectomy 9/26 presenting with nausea, vomiting, and abdominal pain. Patient was recently admitted last night in [facility] for post-op ileus with CT showing fluid filled mall bowel loops, no transition point and maintained on clear liquid, IV fluids, and nausea control. However patient left against medical advise. Patient returned to [place] ED due to N/V and abdominal pain. Emesis is nonbloody non bilious -- he has tried some jello and clears. During prior hospital admission, patient had 1 dose of IV reglan.     Now, patient endorses some nausea and vomiting, and also had a liquid brown bowel movement \"accident\" in the ED moments ago. Continues to be nauseous. He does feel some acid reflux as well. Denies any fevers, chills.    Review of Systems   Constitutional:  Positive for appetite change. Negative for chills and fever.   HENT:  Negative for ear pain and sore " throat.    Eyes:  Negative for pain and visual disturbance.   Respiratory:  Negative for cough and shortness of breath.    Cardiovascular:  Negative for chest pain and palpitations.   Gastrointestinal:  Positive for abdominal pain, nausea and vomiting. Negative for constipation and rectal pain.   Genitourinary:  Negative for dysuria and hematuria.   Musculoskeletal:  Negative for arthralgias and back pain.   Skin:  Negative for color change and rash.   Neurological:  Negative for seizures and syncope.   All other systems reviewed and are negative.        Objective     Vitals:    24 1459   BP: 129/91   BP Location: Right arm   Pulse: 88   Resp: 16   Temp: 98 °F (36.7 °C)   TempSrc: Tympanic   SpO2: 96%      Temperature:   Temp (24hrs), Av.5 °F (36.9 °C), Min:98 °F (36.7 °C), Max:99.4 °F (37.4 °C)    Temperature: 98 °F (36.7 °C)  Intake & Output:  I/O          0701   0700  0701   07 0701   0700    IV Piggyback   1000    Total Intake   1000    Net   +1000                 Weights:        There is no height or weight on file to calculate BMI.  Weight (last 2 days)       None          Physical Exam  Vitals reviewed.   Constitutional:       General: He is not in acute distress.     Appearance: Normal appearance. He is normal weight. He is not ill-appearing.   HENT:      Head: Normocephalic and atraumatic.      Nose: Nose normal. No congestion.      Mouth/Throat:      Mouth: Mucous membranes are moist.      Pharynx: Oropharynx is clear. No oropharyngeal exudate.   Eyes:      Conjunctiva/sclera: Conjunctivae normal.      Pupils: Pupils are equal, round, and reactive to light.   Cardiovascular:      Rate and Rhythm: Normal rate and regular rhythm.      Pulses: Normal pulses.      Heart sounds: Normal heart sounds. No murmur heard.  Pulmonary:      Effort: Pulmonary effort is normal.      Breath sounds: Normal breath sounds. No wheezing or rales.   Abdominal:      General: Bowel  sounds are normal. There is no distension.      Palpations: Abdomen is soft.      Tenderness: There is abdominal tenderness. There is no guarding.      Comments: Laparoscopic scars - well healed. Mild tenderness to umbilical incision area. Bowel sounds hypoactive   Musculoskeletal:         General: No swelling. Normal range of motion.      Cervical back: Normal range of motion and neck supple.   Skin:     General: Skin is warm and dry.      Capillary Refill: Capillary refill takes less than 2 seconds.      Findings: No lesion or rash.   Neurological:      General: No focal deficit present.      Mental Status: He is alert and oriented to person, place, and time.      Gait: Gait normal.   Psychiatric:         Mood and Affect: Mood normal.           Lab Results: I have reviewed the following results: CBC/BMP:   .     09/29/24  0556 09/29/24  1531   WBC 6.52 7.69   HGB 13.0 14.8   HCT 39.3 44.9    189   SODIUM 137 137   K 3.8 3.9   CL 98 95*   CO2 27 33*   BUN 12 14   CREATININE 0.50* 0.75   GLUC 92 105   MG 2.2  --    PHOS 2.4*  --     , Creatinine Clearance: Estimated Creatinine Clearance: 145.5 mL/min (by C-G formula based on SCr of 0.75 mg/dL)., LFTs:   .     09/29/24  1531   AST 28   ALT 51   ALB 3.8   TBILI 0.94   ALKPHOS 70      Recent Labs     09/29/24  0556 09/29/24  1531   WBC 6.52 7.69   HGB 13.0 14.8   HCT 39.3 44.9    189   SODIUM 137 137   K 3.8 3.9   CL 98 95*   CO2 27 33*   BUN 12 14   CREATININE 0.50* 0.75   GLUC 92 105   MG 2.2  --    PHOS 2.4*  --    AST 26 28   ALT 43 51   ALB 3.0* 3.8   TBILI 0.75 0.94   ALKPHOS 56 70         Imaging Review: Reviewed radiology reports from this admission including: CT abdomen/pelvis.  Other Studies: No additional pertinent studies reviewed.    Currently Ordered Meds:   Current Facility-Administered Medications:     multi-electrolyte (PLASMALYTE-A/ISOLYTE-S PH 7.4) IV solution, Continuous  VTE Pharmacologic Prophylaxis: Sequential compression device  "(Venodyne)   VTE Mechanical Prophylaxis: sequential compression device    Administrative Statements   I have spent a total time of 25 minutes in caring for this patient on the day of the visit/encounter including Diagnostic results and Risk factor reductions.  Portions of the record may have been created with voice recognition software.  Occasional wrong word or \"sound a like\" substitutions may have occurred due to the inherent limitations of voice recognition software.  Read the chart carefully and recognize, using context, where substitutions have occurred.  ==  Emilie Soyeon Kim, MD  [facility]  Internal Medicine  "

## 2024-09-29 NOTE — PLAN OF CARE
Problem: Potential for Falls  Goal: Patient will remain free of falls  Description: INTERVENTIONS:  - Educate patient/family on patient safety including physical limitations  - Instruct patient to call for assistance with activity   - Consult OT/PT to assist with strengthening/mobility   - Keep Call bell within reach  - Keep bed low and locked with side rails adjusted as appropriate  - Keep care items and personal belongings within reach  - Initiate and maintain comfort rounds  - Make Fall Risk Sign visible to staff  - Offer Toileting every  Hours, in advance of need  - Initiate/Maintain alarm  - Obtain necessary fall risk management equipment:  - Apply yellow socks and bracelet for high fall risk patients  - Consider moving patient to room near nurses station  9/29/2024 1103 by Carissa Beckett RN  Outcome: Progressing  9/29/2024 1102 by Carissa Beckett RN  Outcome: Progressing     Problem: PAIN - ADULT  Goal: Verbalizes/displays adequate comfort level or baseline comfort level  Description: Interventions:  - Encourage patient to monitor pain and request assistance  - Assess pain using appropriate pain scale  - Administer analgesics based on type and severity of pain and evaluate response  - Implement non-pharmacological measures as appropriate and evaluate response  - Consider cultural and social influences on pain and pain management  - Notify physician/advanced practitioner if interventions unsuccessful or patient reports new pain  9/29/2024 1103 by Carissa Beckett RN  Outcome: Progressing  9/29/2024 1102 by Carissa Beckett RN  Outcome: Progressing     Problem: INFECTION - ADULT  Goal: Absence or prevention of progression during hospitalization  Description: INTERVENTIONS:  - Assess and monitor for signs and symptoms of infection  - Monitor lab/diagnostic results  - Monitor all insertion sites, i.e. indwelling lines, tubes, and drains  - Monitor endotracheal if appropriate and nasal secretions for changes in amount and  color  - Jeffrey appropriate cooling/warming therapies per order  - Administer medications as ordered  - Instruct and encourage patient and family to use good hand hygiene technique  - Identify and instruct in appropriate isolation precautions for identified infection/condition  9/29/2024 1103 by Carissa Beckett RN  Outcome: Progressing  9/29/2024 1102 by Carissa Beckett RN  Outcome: Progressing  Goal: Absence of fever/infection during neutropenic period  Description: INTERVENTIONS:  - Monitor WBC    9/29/2024 1103 by Carissa Beckett RN  Outcome: Progressing  9/29/2024 1102 by Carissa Beckett RN  Outcome: Progressing     Problem: SAFETY ADULT  Goal: Patient will remain free of falls  Description: INTERVENTIONS:  - Educate patient/family on patient safety including physical limitations  - Instruct patient to call for assistance with activity   - Consult OT/PT to assist with strengthening/mobility   - Keep Call bell within reach  - Keep bed low and locked with side rails adjusted as appropriate  - Keep care items and personal belongings within reach  - Initiate and maintain comfort rounds  - Make Fall Risk Sign visible to staff  - Offer Toileting every  Hours, in advance of need  - Initiate/Maintain alarm  - Obtain necessary fall risk management equipment:  - Apply yellow socks and bracelet for high fall risk patients  - Consider moving patient to room near nurses station  9/29/2024 1103 by Carissa Beckett RN  Outcome: Progressing  9/29/2024 1102 by Carissa Beckett RN  Outcome: Progressing  Goal: Maintain or return to baseline ADL function  Description: INTERVENTIONS:  -  Assess patient's ability to carry out ADLs; assess patient's baseline for ADL function and identify physical deficits which impact ability to perform ADLs (bathing, care of mouth/teeth, toileting, grooming, dressing, etc.)  - Assess/evaluate cause of self-care deficits   - Assess range of motion  - Assess patient's mobility; develop plan if impaired  - Assess  patient's need for assistive devices and provide as appropriate  - Encourage maximum independence but intervene and supervise when necessary  - Involve family in performance of ADLs  - Assess for home care needs following discharge   - Consider OT consult to assist with ADL evaluation and planning for discharge  - Provide patient education as appropriate  9/29/2024 1103 by Carissa Beckett RN  Outcome: Progressing  9/29/2024 1102 by Carissa Beckett RN  Outcome: Progressing  Goal: Maintains/Returns to pre admission functional level  Description: INTERVENTIONS:  - Perform AM-PAC 6 Click Basic Mobility/ Daily Activity assessment daily.  - Set and communicate daily mobility goal to care team and patient/family/caregiver.   - Collaborate with rehabilitation services on mobility goals if consulted  - Perform Range of Motion  times a day.  - Reposition patient every  hours.  - Dangle patient  times a day  - Stand patient  times a day  - Ambulate patient  times a day  - Out of bed to chair  times a day   - Out of bed for meals times a day  - Out of bed for toileting  - Record patient progress and toleration of activity level   9/29/2024 1103 by Carissa Beckett RN  Outcome: Progressing  9/29/2024 1102 by Carissa Beckett RN  Outcome: Progressing     Problem: DISCHARGE PLANNING  Goal: Discharge to home or other facility with appropriate resources  Description: INTERVENTIONS:  - Identify barriers to discharge w/patient and caregiver  - Arrange for needed discharge resources and transportation as appropriate  - Identify discharge learning needs (meds, wound care, etc.)  - Arrange for interpretive services to assist at discharge as needed  - Refer to Case Management Department for coordinating discharge planning if the patient needs post-hospital services based on physician/advanced practitioner order or complex needs related to functional status, cognitive ability, or social support system  9/29/2024 1103 by Carissa Beckett RN  Outcome:  Progressing  9/29/2024 1102 by Carissa Beckett RN  Outcome: Progressing     Problem: Knowledge Deficit  Goal: Patient/family/caregiver demonstrates understanding of disease process, treatment plan, medications, and discharge instructions  Description: Complete learning assessment and assess knowledge base.  Interventions:  - Provide teaching at level of understanding  - Provide teaching via preferred learning methods  9/29/2024 1103 by Carissa Beckett RN  Outcome: Progressing  9/29/2024 1102 by Carissa Beckett RN  Outcome: Progressing

## 2024-09-29 NOTE — ED PROVIDER NOTES
Final diagnoses:   None     ED Disposition       None          Assessment & Plan       Medical Decision Making  Male patient who presented to the emergency department via ambulance for abdominal pain and vomiting.  On patient's physical examination, patient was noted to have significant tenderness to palpation diffusely throughout his abdomen with minimal guarding.  Patient's labs show no noted leukocytosis but minimal elevation in LFTs.  Patient CT abdomen pelvis showed multiple dilated fluid-filled small bowel loops most consistent with ileus as per radiology.  While in the emergency department, patient was given Toradol, Tylenol, Zofran, Carafate, Maalox, and Pepcid.  After speaking with general surgery resident, plan is for admission to the hospital under general surgery.  Patient is agreeable to plan.      Amount and/or Complexity of Data Reviewed  Labs: ordered.  Radiology: ordered. Decision-making details documented in ED Course.    Risk  OTC drugs.  Prescription drug management.        ED Course as of 09/29/24 0152   Sun Sep 29, 2024   0058 CT abdomen pelvis with contrast  Multiple dilated fluid-filled small bowel loops throughout the abdomen without discrete transition point, most consistent with ileus.     Trace bilateral pleural effusion.  As per radiology.   0119 Reevaluated at bedside and noted to have minimal to no improvement in symptoms.       Medications   sodium chloride 0.9 % bolus 1,000 mL (1,000 mL Intravenous New Bag 9/28/24 2352)   ketorolac (TORADOL) injection 15 mg (15 mg Intravenous Given 9/28/24 2352)   ondansetron (ZOFRAN) injection 4 mg (4 mg Intravenous Given 9/28/24 2352)   iohexol (OMNIPAQUE) 350 MG/ML injection (MULTI-DOSE) 85 mL (85 mL Intravenous Given 9/29/24 0034)   Famotidine (PF) (PEPCID) injection 20 mg (20 mg Intravenous Given 9/29/24 0054)   aluminum-magnesium hydroxide-simethicone (MAALOX) oral suspension 30 mL (30 mL Oral Given 9/29/24 0053)   sucralfate (CARAFATE) tablet  1 g (1 g Oral Given 9/29/24 0052)   acetaminophen (Ofirmev) injection 1,000 mg (1,000 mg Intravenous New Bag 9/29/24 0132)       ED Risk Strat Scores                                               History of Present Illness       Chief Complaint   Patient presents with    Abdominal Pain     RLQ abdominal pain, gallbladder out recently. +vomiting       Past Medical History:   Diagnosis Date    Asperger's syndrome     COPD (chronic obstructive pulmonary disease) (HCC)     GERD (gastroesophageal reflux disease)     Headache(784.0)     Rectal prolapse     Rectal prolapse 05/09/2018      Past Surgical History:   Procedure Laterality Date    HERNIA REPAIR      TN COLONOSCOPY FLX DX W/COLLJ SPEC WHEN PFRMD N/A 5/7/2018    Procedure: COLONOSCOPY;  Surgeon: Yvonne Cole MD;  Location: [facility];  Service: Colorectal    TN LAPAROSCOPY PROCTOPEXY PROLAPSE SIGMOID RESCJ N/A 5/8/2018    Procedure: LAPAROSCOPIC HAND-ASSIST REPAIR OF RECTAL PROLAPSE BY SACRAL  PROTOPEXY;  Surgeon: Yvonne Cole MD;  Location: [facility];  Service: Colorectal    TN SIGMOIDOSCOPY FLX DX W/COLLJ SPEC BR/WA IF PFRMD N/A 6/19/2018    Procedure: SIGMOIDOSCOPY FLEXIBLE;  Surgeon: Yvonne Cole MD;  Location: [facility];  Service: Colorectal    SURGERY SCROTAL / TESTICULAR        Family History   Family history unknown: Yes      Social History     Tobacco Use    Smoking status: Every Day     Current packs/day: 1.00     Average packs/day: 1 pack/day for 15.0 years (15.0 ttl pk-yrs)     Types: Cigarettes    Smokeless tobacco: Never   Vaping Use    Vaping status: Never Used   Substance Use Topics    Alcohol use: No    Drug use: Not Currently     Types: Methamphetamines     Comment: Daily user -- would like to quit 2-28-23      E-Cigarette/Vaping    E-Cigarette Use Never User       E-Cigarette/Vaping Substances    Nicotine No     THC No     CBD No     Flavoring No     Other No     Unknown No       I have reviewed and agree with the history as  documented.     34-year-old male with significant PMH including GERD who presents to the emergency department for abdominal pain that started last night.  Patient states that his abdominal pain started last night on the right side.  About 2 hours prior to arrival he started to have nausea and vomiting stated he had about 6 episodes of nonbilious nonbloody emesis.  Patient states that on Thursday he had a laparoscopic cholecystectomy due to irritation and gallstones.  Right before his nausea and vomiting started he had drank apple juice.  Patient's last dose of Zofran was around 7 PM.  Also endorses chills and difficulty sleeping.  Denies having a bowel movement since the surgery and usually has one every day. Denies chest pain, SOB, cough, diarrhea, fever, dizziness, lightheadedness, HA, dysuria, hematuria, hematochezia, or melena.           Abdominal Pain  Associated symptoms: chills, constipation, nausea and vomiting    Associated symptoms: no chest pain, no cough, no diarrhea, no dysuria, no fatigue, no fever, no hematuria, no shortness of breath and no sore throat        Review of Systems   Constitutional:  Positive for chills. Negative for appetite change, diaphoresis, fatigue and fever.   HENT: Negative.  Negative for congestion, ear discharge, ear pain, postnasal drip, rhinorrhea, sore throat and trouble swallowing.    Eyes: Negative.  Negative for pain and visual disturbance.   Respiratory: Negative.  Negative for cough and shortness of breath.    Cardiovascular: Negative.  Negative for chest pain.   Gastrointestinal:  Positive for abdominal pain, constipation, nausea and vomiting. Negative for blood in stool and diarrhea.   Genitourinary: Negative.  Negative for decreased urine volume, difficulty urinating, dysuria, flank pain and hematuria.   Musculoskeletal: Negative.  Negative for arthralgias and back pain.   Skin: Negative.  Negative for color change and rash.   Neurological: Negative.  Negative for  dizziness, syncope, weakness, light-headedness and headaches.           Objective       ED Triage Vitals   Temperature Pulse Blood Pressure Respirations SpO2 Patient Position - Orthostatic VS   09/28/24 2332 09/28/24 2325 09/28/24 2325 09/28/24 2325 09/28/24 2325 --   98.4 °F (36.9 °C) 83 127/86 18 97 %       Temp Source Heart Rate Source BP Location FiO2 (%) Pain Score    09/28/24 2332 09/29/24 0000 09/28/24 2325 -- 09/28/24 2352    Oral Monitor Right arm  8      Vitals      Date and Time Temp Pulse SpO2 Resp BP Pain Score FACES Pain Rating User   09/29/24 0130 -- 76 97 % 18 118/85 -- -- EB   09/29/24 0030 -- 73 97 % 16 117/80 -- -- KB   09/29/24 0000 -- 79 97 % 16 121/84 -- -- KB   09/28/24 2352 -- -- -- -- -- 8 -- KB   09/28/24 2332 98.4 °F (36.9 °C) -- -- -- -- -- -- KB   09/28/24 2325 -- 83 97 % 18 127/86 -- -- KB            Physical Exam  Vitals and nursing note reviewed.   Constitutional:       General: He is not in acute distress.     Appearance: Normal appearance. He is normal weight.   HENT:      Head: Normocephalic and atraumatic.      Right Ear: External ear normal.      Left Ear: External ear normal.      Nose: Nose normal.      Mouth/Throat:      Pharynx: Oropharynx is clear.   Eyes:      Conjunctiva/sclera: Conjunctivae normal.   Cardiovascular:      Rate and Rhythm: Normal rate and regular rhythm.      Pulses: Normal pulses.      Heart sounds: Normal heart sounds.      Comments: RRR with +S1 and S2, no murmurs appreciated on exam. Peripheral pulses intact.    Pulmonary:      Effort: Pulmonary effort is normal. No respiratory distress.      Breath sounds: Normal breath sounds. No wheezing, rhonchi or rales.      Comments: CTABL with no abnormal lung sounds such as wheezes or rales appreciated on exam.     Abdominal:      General: Abdomen is flat. Bowel sounds are normal. There is no distension.      Palpations: Abdomen is soft.      Tenderness: There is abdominal tenderness.      Comments: Significant  tenderness diffusely throughout patient's abdomen with minimal guarding.  Soft, normo-active bowel sounds. Without rigidity or distension.     Musculoskeletal:         General: Normal range of motion.      Cervical back: Normal range of motion.   Skin:     General: Skin is warm and dry.   Neurological:      General: No focal deficit present.      Mental Status: He is alert and oriented to person, place, and time. Mental status is at baseline.      Comments: CN grossly intact on visualization. No focal neurologic deficits noted on exam. 5/5 strength in all extremities. Neurovascularly intact with normal sensation and motor function.             Results Reviewed       Procedure Component Value Units Date/Time    [place] draw [[id number]] Collected: 09/28/24 2332    Lab Status: Final result Specimen: Blood from Arm, Right Updated: 09/29/24 0101    Narrative:      The following orders were created for panel order [place] draw.  Procedure                               Abnormality         Status                     ---------                               -----------         ------                     Light Blue Top on hold[[id number]]                           Final result               Green / Yellow tube on hold[[id number]]                      Final result               Green / Black tube on hold[[id number]]                       Final result               Lavender Top 3 ml on hold[[id number]]                        Final result                 Please view results for these tests on the individual orders.    CBC and differential [[id number]]  (Abnormal) Collected: 09/28/24 2332    Lab Status: Final result Specimen: Blood from Arm, Right Updated: 09/29/24 0020     WBC 8.85 Thousand/uL      RBC 5.50 Million/uL      Hemoglobin 15.7 g/dL      Hematocrit 46.8 %      MCV 85 fL      MCH 28.5 pg      MCHC 33.5 g/dL      RDW 13.5 %      MPV 9.5 fL      Platelets 185 Thousands/uL      nRBC 0 /100 WBCs      Segmented % 82 %       Immature Grans % 0 %      Lymphocytes % 12 %      Monocytes % 6 %      Eosinophils Relative 0 %      Basophils Relative 0 %      Absolute Neutrophils 7.17 Thousands/µL      Absolute Immature Grans 0.03 Thousand/uL      Absolute Lymphocytes 1.06 Thousands/µL      Absolute Monocytes 0.56 Thousand/µL      Eosinophils Absolute 0.01 Thousand/µL      Basophils Absolute 0.02 Thousands/µL     Comprehensive metabolic panel [[id number]]  (Abnormal) Collected: 09/28/24 2332    Lab Status: Final result Specimen: Blood from Arm, Right Updated: 09/29/24 0018     Sodium 138 mmol/L      Potassium 4.2 mmol/L      Chloride 95 mmol/L      CO2 35 mmol/L      ANION GAP 8 mmol/L      BUN 13 mg/dL      Creatinine 0.84 mg/dL      Glucose 113 mg/dL      Calcium 10.0 mg/dL      AST 45 U/L      ALT 70 U/L      Alkaline Phosphatase 79 U/L      Total Protein 7.6 g/dL      Albumin 4.1 g/dL      Total Bilirubin 1.12 mg/dL      eGFR 114 ml/min/1.73sq m     Narrative:      National Kidney Disease Foundation guidelines for Chronic Kidney Disease (CKD):     Stage 1 with normal or high GFR (GFR > 90 mL/min/1.73 square meters)    Stage 2 Mild CKD (GFR = 60-89 mL/min/1.73 square meters)    Stage 3A Moderate CKD (GFR = 45-59 mL/min/1.73 square meters)    Stage 3B Moderate CKD (GFR = 30-44 mL/min/1.73 square meters)    Stage 4 Severe CKD (GFR = 15-29 mL/min/1.73 square meters)    Stage 5 End Stage CKD (GFR <15 mL/min/1.73 square meters)  Note: GFR calculation is accurate only with a steady state creatinine    Lipase [[id number]]  (Normal) Collected: 09/28/24 2332    Lab Status: Final result Specimen: Blood from Arm, Right Updated: 09/29/24 0018     Lipase 19 u/L             CT abdomen pelvis with contrast   Final Interpretation by Alcides Townsend DO (09/29 0110)      Multiple dilated fluid-filled small bowel loops throughout the abdomen without discrete transition point, most consistent with ileus.      Trace bilateral pleural effusion.      The study was  marked in EPIC for immediate notification.      Workstation performed: [id number]             Procedures    ED Medication and Procedure Management   Prior to Admission Medications   Prescriptions Last Dose Informant Patient Reported? Taking?   acyclovir (Zovirax) 800 mg tablet   No No   Sig: Take 1 tablet (800 mg total) by mouth 2 (two) times a day for 10 days   Patient not taking: Reported on 7/26/2022   busPIRone (BUSPAR) 10 mg tablet  Self Yes No   Sig: Take 1 tablet by mouth 3 (three) times a day   diphenhydrAMINE HCl (ELIAN-SELTZER PLUS ALLERGY PO)  Self Yes No   Sig: Take by mouth   ondansetron (ZOFRAN) 4 mg tablet   No No   Sig: Take 1 tablet (4 mg total) by mouth every 6 (six) hours as needed for nausea or vomiting for up to 7 days      Facility-Administered Medications: None     Patient's Medications   Discharge Prescriptions    No medications on file     No discharge procedures on file.  ED SEPSIS DOCUMENTATION            Jose Ward MD  09/29/24 0152

## 2024-09-30 ENCOUNTER — TRANSITIONAL CARE MANAGEMENT (OUTPATIENT)
Dept: FAMILY MEDICINE CLINIC | Facility: CLINIC | Age: 34
End: 2024-09-30

## 2024-09-30 VITALS
SYSTOLIC BLOOD PRESSURE: 119 MMHG | TEMPERATURE: 98.2 F | RESPIRATION RATE: 18 BRPM | HEART RATE: 72 BPM | HEIGHT: 74 IN | OXYGEN SATURATION: 96 % | DIASTOLIC BLOOD PRESSURE: 79 MMHG | BODY MASS INDEX: 23.25 KG/M2 | WEIGHT: 181.2 LBS

## 2024-09-30 LAB
BACTERIA UR QL AUTO: ABNORMAL /HPF
BASOPHILS # BLD AUTO: 0.01 THOUSANDS/ÂΜL (ref 0–0.1)
BASOPHILS NFR BLD AUTO: 0 % (ref 0–1)
BILIRUB UR QL STRIP: ABNORMAL
CLARITY UR: CLEAR
COLOR UR: YELLOW
EOSINOPHIL # BLD AUTO: 0.02 THOUSAND/ÂΜL (ref 0–0.61)
EOSINOPHIL NFR BLD AUTO: 0 % (ref 0–6)
ERYTHROCYTE [DISTWIDTH] IN BLOOD BY AUTOMATED COUNT: 14.5 % (ref 11.6–15.1)
GLUCOSE UR STRIP-MCNC: NEGATIVE MG/DL
HCT VFR BLD AUTO: 39.5 % (ref 36.5–49.3)
HGB BLD-MCNC: 13.2 G/DL (ref 12–17)
HGB UR QL STRIP.AUTO: NEGATIVE
IMM GRANULOCYTES # BLD AUTO: 0.03 THOUSAND/UL (ref 0–0.2)
IMM GRANULOCYTES NFR BLD AUTO: 1 % (ref 0–2)
KETONES UR STRIP-MCNC: ABNORMAL MG/DL
LEUKOCYTE ESTERASE UR QL STRIP: NEGATIVE
LYMPHOCYTES # BLD AUTO: 1.24 THOUSANDS/ÂΜL (ref 0.6–4.47)
LYMPHOCYTES NFR BLD AUTO: 22 % (ref 14–44)
MCH RBC QN AUTO: 28.4 PG (ref 26.8–34.3)
MCHC RBC AUTO-ENTMCNC: 33.4 G/DL (ref 31.4–37.4)
MCV RBC AUTO: 85 FL (ref 82–98)
MONOCYTES # BLD AUTO: 0.42 THOUSAND/ÂΜL (ref 0.17–1.22)
MONOCYTES NFR BLD AUTO: 8 % (ref 4–12)
MUCOUS THREADS UR QL AUTO: ABNORMAL
NEUTROPHILS # BLD AUTO: 3.84 THOUSANDS/ÂΜL (ref 1.85–7.62)
NEUTS SEG NFR BLD AUTO: 69 % (ref 43–75)
NITRITE UR QL STRIP: NEGATIVE
NON-SQ EPI CELLS URNS QL MICRO: ABNORMAL /HPF
NRBC BLD AUTO-RTO: 0 /100 WBCS
PH UR STRIP.AUTO: 7 [PH]
PLATELET # BLD AUTO: 260 THOUSANDS/UL (ref 149–390)
PMV BLD AUTO: 12.4 FL (ref 8.9–12.7)
PROT UR STRIP-MCNC: ABNORMAL MG/DL
RBC # BLD AUTO: 4.64 MILLION/UL (ref 3.88–5.62)
RBC #/AREA URNS AUTO: ABNORMAL /HPF
SP GR UR STRIP.AUTO: >=1.03 (ref 1–1.03)
UROBILINOGEN UR QL STRIP.AUTO: 1 E.U./DL
WBC # BLD AUTO: 5.56 THOUSAND/UL (ref 4.31–10.16)
WBC #/AREA URNS AUTO: ABNORMAL /HPF

## 2024-09-30 PROCEDURE — 85025 COMPLETE CBC W/AUTO DIFF WBC: CPT

## 2024-09-30 PROCEDURE — 99222 1ST HOSP IP/OBS MODERATE 55: CPT | Performed by: PHYSICIAN ASSISTANT

## 2024-09-30 PROCEDURE — 81001 URINALYSIS AUTO W/SCOPE: CPT

## 2024-09-30 PROCEDURE — NC001 PR NO CHARGE: Performed by: PHYSICIAN ASSISTANT

## 2024-09-30 PROCEDURE — 99239 HOSP IP/OBS DSCHRG MGMT >30: CPT | Performed by: INTERNAL MEDICINE

## 2024-09-30 RX ORDER — NICOTINE 21 MG/24HR
1 PATCH, TRANSDERMAL 24 HOURS TRANSDERMAL DAILY
Qty: 28 PATCH | Refills: 0 | Status: SHIPPED | OUTPATIENT
Start: 2024-10-01

## 2024-09-30 RX ORDER — KETOROLAC TROMETHAMINE 30 MG/ML
15 INJECTION, SOLUTION INTRAMUSCULAR; INTRAVENOUS EVERY 6 HOURS PRN
Status: DISCONTINUED | OUTPATIENT
Start: 2024-09-30 | End: 2024-09-30 | Stop reason: HOSPADM

## 2024-09-30 RX ORDER — MAGNESIUM SULFATE HEPTAHYDRATE 40 MG/ML
2 INJECTION, SOLUTION INTRAVENOUS ONCE
Status: COMPLETED | OUTPATIENT
Start: 2024-09-30 | End: 2024-09-30

## 2024-09-30 RX ORDER — ACETAMINOPHEN 10 MG/ML
1000 INJECTION, SOLUTION INTRAVENOUS EVERY 6 HOURS PRN
Status: DISCONTINUED | OUTPATIENT
Start: 2024-09-30 | End: 2024-09-30

## 2024-09-30 RX ORDER — PANTOPRAZOLE SODIUM 40 MG/1
40 TABLET, DELAYED RELEASE ORAL DAILY
Qty: 30 TABLET | Refills: 0 | Status: SHIPPED | OUTPATIENT
Start: 2024-09-30

## 2024-09-30 RX ORDER — ACETAMINOPHEN 10 MG/ML
1000 INJECTION, SOLUTION INTRAVENOUS EVERY 6 HOURS PRN
Status: DISCONTINUED | OUTPATIENT
Start: 2024-09-30 | End: 2024-09-30 | Stop reason: HOSPADM

## 2024-09-30 RX ADMIN — HEPARIN SODIUM 5000 UNITS: 5000 INJECTION, SOLUTION INTRAVENOUS; SUBCUTANEOUS at 05:54

## 2024-09-30 RX ADMIN — MAGNESIUM SULFATE HEPTAHYDRATE 2 G: 40 INJECTION, SOLUTION INTRAVENOUS at 02:05

## 2024-09-30 RX ADMIN — SODIUM CHLORIDE, SODIUM GLUCONATE, SODIUM ACETATE, POTASSIUM CHLORIDE, MAGNESIUM CHLORIDE, SODIUM PHOSPHATE, DIBASIC, AND POTASSIUM PHOSPHATE 125 ML/HR: .53; .5; .37; .037; .03; .012; .00082 INJECTION, SOLUTION INTRAVENOUS at 09:43

## 2024-09-30 RX ADMIN — KETOROLAC TROMETHAMINE 15 MG: 30 INJECTION, SOLUTION INTRAMUSCULAR at 09:37

## 2024-09-30 RX ADMIN — NICOTINE 1 PATCH: 21 PATCH, EXTENDED RELEASE TRANSDERMAL at 09:38

## 2024-09-30 RX ADMIN — PANTOPRAZOLE SODIUM 40 MG: 40 INJECTION, POWDER, FOR SOLUTION INTRAVENOUS at 09:37

## 2024-09-30 RX ADMIN — ACETAMINOPHEN 1000 MG: 10 INJECTION INTRAVENOUS at 06:06

## 2024-09-30 NOTE — DISCHARGE SUMMARY
"[facility]   Discharge - Name: Yuval Matute I  MRN: [id number]  Unit/Bed#: -01 I Date of Admission: 9/29/2024   Date of Service: 9/30/2024 I Hospital Day: 1      Active Problems:    Rectal prolapse    Autism spectrum    Postoperative ileus (HCC)      Medical Problems       Resolved Problems  Date Reviewed: 7/26/2022   None         Discharging Physician / Practitioner: Mando Banda DO  PCP: ALYSHA Heller DO  Admission Date:   Admission Orders (From admission, onward)       Ordered        09/29/24 1637  INPATIENT ADMISSION  Once                          Discharge Date: 09/30/24    Consultations During Hospital Stay:  IP CONSULT TO ACUTE CARE SURGERY    Procedures Performed:   None    Significant Findings / Test Results:   CT abdomen pelvis with contrast    Result Date: 9/29/2024  Impression: Multiple dilated fluid-filled small bowel loops throughout the abdomen without discrete transition point, most consistent with ileus. Trace bilateral pleural effusion. The study was marked in EPIC for immediate notification. Workstation performed: [id number]       No Chest XR results available for this patient.   No results found for this or any previous visit.      No results for input(s): \"BLOODCX\", \"SPUTUMCULTUR\", \"GRAMSTAIN\", \"URINECX\", \"WOUNDCULT\", \"BODYFLUIDCUL\", \"MRSACULTURE\", \"INFLUAPCR\", \"INFLUBPCR\", \"RSVPCR\", \"LEGIONELLAUR\", \"STPU\", \"CDIFFTOXINB\" in the last 72 hours.    Incidental Findings:   None other than noted above; I reviewed the above mentioned incidental findings with the patient and/or family and they expressed understanding.    Test Results Pending at Discharge (will require follow up):   None     Outpatient Tests Requested:  None    Complications:  None    Reason for Admission:   Chief Complaint   Patient presents with    Post-op Problem     Pt presents to ED s/o gallbladder removal at  [place] Thursday. Per pt's mother who is at bedside, pt was DCd from  Friday " "and seen for same symptoms today at [facility]- received CT scan but signed out AMA. Pt presents now for vomiting, c/o vomiting and RLQ \"pinching pain\". Denies fevers.          Hospital Course:   Yuval Matute is a 34 y.o. male patient who originally presented to the hospital on 9/29/2024 due to Post-op Problem (Pt presents to ED s/o gallbladder removal at  [place] Thursday. Per pt's mother who is at bedside, pt was DCd from  Friday and seen for same symptoms today at [facility]- received CT scan but signed out AMA. Pt presents now for vomiting, c/o vomiting and RLQ \"pinching pain\". Denies fevers. )    Review of chart showed patient  has a past medical history of Asperger's syndrome, COPD (chronic obstructive pulmonary disease) (HCC), GERD (gastroesophageal reflux disease), Headache(784.0), Rectal prolapse, and Rectal prolapse (05/09/2018).    Patient admitted for management of postoperative ileus.  Previously had laparoscopic cholecystectomy as noted above.  CT imaging obtained on admission was showing findings consistent with ileus.  Symptoms did improve shortly after admission and general surgery was consulted for additional recommendations for management.  Overall no acute surgical intervention was indicated and recommendation was to advance diet as tolerated.  Patient was trialed on surgical soft diet and deemed stable for discharge once able to tolerate the diet.  Recommended follow-up with outpatient PCP within 1 to weeks after discharge and also with outpatient surgical team within 2 weeks.  All questions answered the patient.    Please see above list of diagnoses and related plan for additional information.     Condition at Discharge: stable    Discharge Day Visit / Exam:   Subjective:  Offers no complaints at this time.  Is eating surgical soft diet for lunch and is able to keep it down well.  Eager for discharge.  Vitals: Blood Pressure: 119/79 (09/30/24 0831)  Pulse: 72 (09/30/24 " "0831)  Temperature: 98.2 °F (36.8 °C) (09/30/24 0831)  Temp Source: Tympanic (09/30/24 0831)  Respirations: 18 (09/30/24 0831)  Height: 6' 2\" (188 cm) (09/29/24 2119)  Weight - Scale: 82.2 kg (181 lb 3.2 oz) (09/30/24 0555)  SpO2: 96 % (09/30/24 0831)  Exam:   Physical Exam  Vitals and nursing note reviewed.   Constitutional:       General: He is not in acute distress.     Appearance: He is well-developed. He is ill-appearing (Chronically). He is not diaphoretic.   HENT:      Head: Normocephalic and atraumatic.      Nose: Nose normal.   Eyes:      General: No scleral icterus.     Conjunctiva/sclera: Conjunctivae normal.      Pupils: Pupils are equal, round, and reactive to light.   Neck:      Thyroid: No thyromegaly.   Cardiovascular:      Rate and Rhythm: Normal rate and regular rhythm.      Heart sounds: Normal heart sounds. No murmur heard.  Pulmonary:      Effort: Pulmonary effort is normal.      Breath sounds: Normal breath sounds. No wheezing, rhonchi or rales.   Abdominal:      General: Bowel sounds are normal. There is no distension.      Palpations: Abdomen is soft.      Tenderness: There is no abdominal tenderness.      Comments: [place]car sites clean dry and intact with minimal tenderness   Musculoskeletal:         General: No swelling, tenderness or deformity.      Cervical back: Neck supple.   Skin:     General: Skin is warm and dry.   Neurological:      Mental Status: He is alert and oriented to person, place, and time. Mental status is at baseline.   Psychiatric:         Behavior: Behavior normal.         Thought Content: Thought content normal.         Judgment: Judgment normal.          Discussion with Family: Patient declined call to .     Discharge instructions/Information to patient and family:   See after visit summary for information provided to patient and family.      Provisions for Follow-Up Care:  See after visit summary for information related to follow-up care and any pertinent " home health orders.       Mobility at time of Discharge:   Basic Mobility Inpatient Raw Score: 24  JH-HLM Goal: 8: Walk 250 feet or more  JH-HLM Achieved: 8: Walk 250 feet ot more  HLM Goal achieved. Continue to encourage appropriate mobility.    Disposition:   Home    Planned Readmission: none     Discharge Statement:  I spent 49 minutes discharging the patient. This time was spent on the day of discharge. I had direct contact with the patient on the day of discharge. Greater than 50% of the total time was spent examining patient, answering all patient questions, arranging and discussing plan of care with patient as well as directly providing post-discharge instructions.  Additional time then spent on discharge activities.    Discharge Medications:  See after visit summary for reconciled discharge medications provided to patient and/or family.      **Please Note: This note may have been constructed using a voice recognition system**

## 2024-09-30 NOTE — ASSESSMENT & PLAN NOTE
Presents with post-op ileus after lap mei on 9/26. Returned to [facility] after AMA from [facility]. S/p 1 dose reglan at [place]. CT with ileus, no transition point. Passing flatus and small liquid bowel movement -- improving ileus. Still has N/V.  IV zofran for nausea/vomiting  Discussed importance of ambulation for ileus  Continue only on minimal clears. No solid food yet.   IVF on isolyte 150 cc/hr  Avoid any opioid use to prevent worsening of ileus  General surgery consulted

## 2024-09-30 NOTE — PLAN OF CARE
Problem: PAIN - ADULT  Goal: Verbalizes/displays adequate comfort level or baseline comfort level  Description: Interventions:  - Encourage patient to monitor pain and request assistance  - Assess pain using appropriate pain scale  - Administer analgesics based on type and severity of pain and evaluate response  - Implement non-pharmacological measures as appropriate and evaluate response  - Consider cultural and social influences on pain and pain management  - Notify physician/advanced practitioner if interventions unsuccessful or patient reports new pain  Outcome: Progressing     Problem: INFECTION - ADULT  Goal: Absence or prevention of progression during hospitalization  Description: INTERVENTIONS:  - Assess and monitor for signs and symptoms of infection  - Monitor lab/diagnostic results  - Monitor all insertion sites, i.e. indwelling lines, tubes, and drains  - Monitor endotracheal if appropriate and nasal secretions for changes in amount and color  - [place] appropriate cooling/warming therapies per order  - Administer medications as ordered  - Instruct and encourage patient and family to use good hand hygiene technique  - Identify and instruct in appropriate isolation precautions for identified infection/condition  Outcome: Progressing     Problem: METABOLIC, FLUID AND ELECTROLYTES - ADULT  Goal: Electrolytes maintained within normal limits  Description: INTERVENTIONS:  - Monitor labs and assess patient for signs and symptoms of electrolyte imbalances  - Administer electrolyte replacement as ordered  - Monitor response to electrolyte replacements, including repeat lab results as appropriate  - Instruct patient on fluid and nutrition as appropriate  Outcome: Progressing     Problem: Knowledge Deficit  Goal: Patient/family/caregiver demonstrates understanding of disease process, treatment plan, medications, and discharge instructions  Description: Complete learning assessment and assess knowledge  base.  Interventions:  - Provide teaching at level of understanding  - Provide teaching via preferred learning methods  Outcome: Progressing     Problem: HEMATOLOGIC - ADULT  Goal: Maintains hematologic stability  Description: INTERVENTIONS  - Assess for signs and symptoms of bleeding or hemorrhage  - Monitor labs  - Administer supportive blood products/factors as ordered and appropriate  Outcome: Progressing

## 2024-09-30 NOTE — QUICK NOTE
On rounds on the floors I was notified by RN about patient several episodes of liquid bowel movements.  The patient was admitted on 9/29/2024 due to rectal prolapse and postoperative ileus.  Per attending notes the patient presented with postop ileus after lap mei on 9/26, left AGAINST MEDICAL ADVICE from [facility].  Returns to [facility] due to abdominal pain, nausea and vomiting.  CT abdomen pelvis with contrast showed multiple dilated fluid-filled small bowel loops through the abdomen without discrete transition point most consistent with an ileus, bilateral pleural effusion is present.  The patient is currently reporting abdominal pain, a one-time dose of morphine 2 mg has been ordered.  Continue with IV Tylenol and ketorolac for pain.  Reassess electrolytes in the morning due to several liquid bowel movements, replace as needed.  Continue with hydration and monitoring the patient closely.

## 2024-09-30 NOTE — CONSULTS
Consultation - Surgery-General   Name: Yuval Matute 34 y.o. male I MRN: [id number]  Unit/Bed#: -01 I Date of Admission: 9/29/2024   Date of Service: 9/30/2024 I Hospital Day: 1   Consults  Physician Requesting Evaluation: Mando Banda DO   Reason for Evaluation / Principal Problem: postoperative ileus    Assessment & Plan  Postoperative ileus (HCC)  Patient presents with post-op ileus, abdominal pain, nausea, vomiting after lap mei on 9/26 which prompted return to [facility] ED. Patient has had several liquid bowel movements and is passing flatus since admission. CT abdomen/pelvis with contrast shows findings consistent with ileus.   At the time of my exam, pt reports resolution of N/V and abdominal pain and reports that his bloating is much improved  AVSS, CBC WNL.  Abdomen soft, mildly distended, non-tender, multiple laparoscopic incisions with surgical glue, no erythema or discharge  Pt tolerating CLD    1) No surgical intervention needed  2) Advance diet as tolerated  3) Discharge per medical team      Rectal prolapse  History of rectal prolapse with proctopexy in 2018. No current complaints.    Autism spectrum  History of autism spectrum disorder.    Surgery-General service signing off.    History of Present Illness   Yuval Matute is a 34 y.o. male with a PMH significant for laparoscopic cholecystectomy, post-operative ileus, rectal prolapse with proctopexy, methamphetamine abuse, and autism spectrum disorder who presents with abdominal pain, nausea, and vomiting. Patient underwent lap mei at [facility] on 9/26 and was discharged home the next morning. Per chart review, pt was at rehab for methamphetamine abuse however didn't want to return to rehab and instead went home to live with his mother. He then presented to [facility] on 9/28 with post-op ileus but started to feel better and ended up leaving AMA. He now returns to [facility] ED on 9/29 with complaints of abdominal  pain, nausea, and vomiting. CT abdomen/pelvis showed findings consistent with ileus. Patient states that experienced bilious vomiting prior to presenting to the ED and has been having liquid bowel movements. Upon arrival, patient stated that he felt that his abdomen was distended and reported generalized abdominal pain. Patient denied hematemesis, hematochezia or melena, difficulty with urinating, fever, or chills. Vomiting, pain, and bloating has since subsided and patient has no other acute concerns.    Review of Systems   Constitutional:  Negative for chills, diaphoresis and fever.   Gastrointestinal:  Positive for abdominal distention, abdominal pain, diarrhea, nausea and vomiting. Negative for blood in stool.   Genitourinary:  Negative for difficulty urinating.   Neurological:  Negative for dizziness.       Objective      Temp:  [97.8 °F (36.6 °C)-98 °F (36.7 °C)] 97.8 °F (36.6 °C)  HR:  [86-96] 86  Resp:  [16-18] 18  BP: (127-129)/(80-91) 127/80  O2 Device: None (Room air)          I/O         09/28 0701  09/29 0700 09/29 0701  09/30 0700 09/30 0701  10/01 0700    IV Piggyback  1000     Total Intake(mL/kg)  1000 (12.2)     Urine (mL/kg/hr)  275     Stool  0     Total Output  275     Net  +725            Unmeasured Stool Occurrence  1 x           Lines/Drains/Airways       Active Status       None                  Physical Exam  Constitutional:       General: He is not in acute distress.     Appearance: Normal appearance. He is not ill-appearing, toxic-appearing or diaphoretic.   HENT:      Head: Normocephalic and atraumatic.   Abdominal:      General: Bowel sounds are normal. There is distension.      Palpations: Abdomen is soft.      Tenderness: There is no abdominal tenderness. There is no guarding or rebound.      Comments: Laparoscopic incisions are dry, clean, and intact   Skin:     General: Skin is warm and dry.   Neurological:      Mental Status: He is alert.          Lab Results: I have reviewed the  following results: CBC/BMP:   .     09/29/24  1531 09/29/24  2114 09/30/24  0545   WBC 7.69  --  5.56   HGB 14.8  --  13.2   HCT 44.9  --  39.5     --  260   SODIUM 137  --   --    K 3.9  --   --    CL 95*  --   --    CO2 33*  --   --    BUN 14  --   --    CREATININE 0.75  --   --    GLUC 105  --   --    MG  --  1.8*  --       Imaging Review: No pertinent imaging studies reviewed.  Other Studies: No additional pertinent studies reviewed.    VTE Pharmacologic Prophylaxis: Heparin  VTE Mechanical Prophylaxis: sequential compression device    Asuncion Trejo  9/30/2024     [employer] in [employer]

## 2024-09-30 NOTE — ASSESSMENT & PLAN NOTE
Patient presents with post-op ileus, abdominal pain, nausea, vomiting after lap mei on 9/26 which prompted return to [facility] ED. Patient has had several liquid bowel movements and is passing flatus since admission. CT abdomen/pelvis with contrast shows findings consistent with ileus.   At the time of my exam, pt reports resolution of N/V and abdominal pain and reports that his bloating is much improved  AVSS, CBC WNL.  Abdomen soft, mildly distended, non-tender, multiple laparoscopic incisions with surgical glue, no erythema or discharge  Pt tolerating CLD    1) No surgical intervention needed  2) Advance diet as tolerated  3) Discharge per medical team

## 2024-10-10 ENCOUNTER — HOSPITAL ENCOUNTER (OUTPATIENT)
Dept: RADIOLOGY | Facility: HOSPITAL | Age: 34
End: 2024-10-10
Payer: MEDICARE

## 2024-10-10 ENCOUNTER — APPOINTMENT (OUTPATIENT)
Dept: LAB | Facility: HOSPITAL | Age: 34
End: 2024-10-10
Payer: MEDICARE

## 2024-10-10 ENCOUNTER — OFFICE VISIT (OUTPATIENT)
Dept: FAMILY MEDICINE CLINIC | Facility: CLINIC | Age: 34
End: 2024-10-10
Payer: MEDICARE

## 2024-10-10 VITALS
OXYGEN SATURATION: 97 % | HEIGHT: 74 IN | SYSTOLIC BLOOD PRESSURE: 114 MMHG | DIASTOLIC BLOOD PRESSURE: 80 MMHG | WEIGHT: 176 LBS | BODY MASS INDEX: 22.59 KG/M2 | TEMPERATURE: 100.5 F | HEART RATE: 100 BPM

## 2024-10-10 DIAGNOSIS — R76.8 HEPATITIS C ANTIBODY TEST POSITIVE: ICD-10-CM

## 2024-10-10 DIAGNOSIS — K91.89 POSTOPERATIVE ILEUS (HCC): ICD-10-CM

## 2024-10-10 DIAGNOSIS — R50.9 FUO (FEVER OF UNKNOWN ORIGIN): Primary | ICD-10-CM

## 2024-10-10 DIAGNOSIS — R50.9 FUO (FEVER OF UNKNOWN ORIGIN): ICD-10-CM

## 2024-10-10 DIAGNOSIS — T81.10XA: ICD-10-CM

## 2024-10-10 DIAGNOSIS — Z13.0 SCREENING FOR IRON DEFICIENCY ANEMIA: ICD-10-CM

## 2024-10-10 DIAGNOSIS — K56.7 POSTOPERATIVE ILEUS (HCC): ICD-10-CM

## 2024-10-10 DIAGNOSIS — R74.8 ABNORMAL LEVELS OF OTHER SERUM ENZYMES: ICD-10-CM

## 2024-10-10 DIAGNOSIS — F19.10 DRUG ABUSE (HCC): ICD-10-CM

## 2024-10-10 LAB
ALBUMIN SERPL BCG-MCNC: 4.3 G/DL (ref 3.5–5)
ALP SERPL-CCNC: 81 U/L (ref 34–104)
ALT SERPL W P-5'-P-CCNC: 17 U/L (ref 7–52)
ANION GAP SERPL CALCULATED.3IONS-SCNC: 9 MMOL/L (ref 4–13)
AST SERPL W P-5'-P-CCNC: 14 U/L (ref 13–39)
BASOPHILS # BLD AUTO: 0.04 THOUSANDS/ΜL (ref 0–0.1)
BASOPHILS NFR BLD AUTO: 1 % (ref 0–1)
BILIRUB SERPL-MCNC: 0.43 MG/DL (ref 0.2–1)
BILIRUB UR QL STRIP: NEGATIVE
BUN SERPL-MCNC: 17 MG/DL (ref 5–25)
CALCIUM SERPL-MCNC: 9.9 MG/DL (ref 8.4–10.2)
CHLORIDE SERPL-SCNC: 99 MMOL/L (ref 96–108)
CLARITY UR: CLEAR
CO2 SERPL-SCNC: 32 MMOL/L (ref 21–32)
COLOR UR: ABNORMAL
CREAT SERPL-MCNC: 0.73 MG/DL (ref 0.6–1.3)
CREAT UR-MCNC: 161.1 MG/DL
CRP SERPL QL: 63.3 MG/L
EOSINOPHIL # BLD AUTO: 0.06 THOUSAND/ΜL (ref 0–0.61)
EOSINOPHIL NFR BLD AUTO: 1 % (ref 0–6)
ERYTHROCYTE [DISTWIDTH] IN BLOOD BY AUTOMATED COUNT: 12.9 % (ref 11.6–15.1)
ERYTHROCYTE [SEDIMENTATION RATE] IN BLOOD: 18 MM/HOUR (ref 0–14)
GFR SERPL CREATININE-BSD FRML MDRD: 121 ML/MIN/1.73SQ M
GGT SERPL-CCNC: 30 U/L (ref 9–64)
GLUCOSE SERPL-MCNC: 88 MG/DL (ref 65–140)
GLUCOSE UR STRIP-MCNC: NEGATIVE MG/DL
HCT VFR BLD AUTO: 43.9 % (ref 36.5–49.3)
HGB BLD-MCNC: 13.8 G/DL (ref 12–17)
HGB UR QL STRIP.AUTO: NEGATIVE
IMM GRANULOCYTES # BLD AUTO: 0.02 THOUSAND/UL (ref 0–0.2)
IMM GRANULOCYTES NFR BLD AUTO: 0 % (ref 0–2)
KETONES UR STRIP-MCNC: NEGATIVE MG/DL
LEUKOCYTE ESTERASE UR QL STRIP: NEGATIVE
LYMPHOCYTES # BLD AUTO: 2.06 THOUSANDS/ΜL (ref 0.6–4.47)
LYMPHOCYTES NFR BLD AUTO: 24 % (ref 14–44)
MCH RBC QN AUTO: 27 PG (ref 26.8–34.3)
MCHC RBC AUTO-ENTMCNC: 31.4 G/DL (ref 31.4–37.4)
MCV RBC AUTO: 86 FL (ref 82–98)
MICROALBUMIN UR-MCNC: 7 MG/L
MICROALBUMIN/CREAT 24H UR: 4 MG/G CREATININE (ref 0–30)
MONOCYTES # BLD AUTO: 0.62 THOUSAND/ΜL (ref 0.17–1.22)
MONOCYTES NFR BLD AUTO: 7 % (ref 4–12)
NEUTROPHILS # BLD AUTO: 5.82 THOUSANDS/ΜL (ref 1.85–7.62)
NEUTS SEG NFR BLD AUTO: 67 % (ref 43–75)
NITRITE UR QL STRIP: NEGATIVE
NRBC BLD AUTO-RTO: 0 /100 WBCS
PH UR STRIP.AUTO: 6 [PH]
PLATELET # BLD AUTO: 568 THOUSANDS/UL (ref 149–390)
PMV BLD AUTO: 9 FL (ref 8.9–12.7)
POTASSIUM SERPL-SCNC: 4.1 MMOL/L (ref 3.5–5.3)
PROT SERPL-MCNC: 8.1 G/DL (ref 6.4–8.4)
PROT UR STRIP-MCNC: NEGATIVE MG/DL
RBC # BLD AUTO: 5.11 MILLION/UL (ref 3.88–5.62)
SODIUM SERPL-SCNC: 140 MMOL/L (ref 135–147)
SP GR UR STRIP.AUTO: 1.02 (ref 1–1.03)
UROBILINOGEN UR QL STRIP.AUTO: 0.2 E.U./DL
WBC # BLD AUTO: 8.62 THOUSAND/UL (ref 4.31–10.16)

## 2024-10-10 PROCEDURE — 80053 COMPREHEN METABOLIC PANEL: CPT

## 2024-10-10 PROCEDURE — 71046 X-RAY EXAM CHEST 2 VIEWS: CPT

## 2024-10-10 PROCEDURE — 87522 HEPATITIS C REVRS TRNSCRPJ: CPT

## 2024-10-10 PROCEDURE — 85652 RBC SED RATE AUTOMATED: CPT

## 2024-10-10 PROCEDURE — 87521 HEPATITIS C PROBE&RVRS TRNSC: CPT

## 2024-10-10 PROCEDURE — 85025 COMPLETE CBC W/AUTO DIFF WBC: CPT

## 2024-10-10 PROCEDURE — 99496 TRANSJ CARE MGMT HIGH F2F 7D: CPT | Performed by: FAMILY MEDICINE

## 2024-10-10 PROCEDURE — 86140 C-REACTIVE PROTEIN: CPT

## 2024-10-10 PROCEDURE — 36415 COLL VENOUS BLD VENIPUNCTURE: CPT

## 2024-10-10 PROCEDURE — 82977 ASSAY OF GGT: CPT

## 2024-10-10 NOTE — PROGRESS NOTES
Transition of Care Visit  Name: Yuval Matute      : 1990      MRN: [id number]  Encounter Provider: ALYSHA Heller DO  Encounter Date: 10/10/2024   Encounter department: [facility]    Assessment & Plan  1. Postoperative ileus.  He underwent a laparoscopic cholecystectomy on 2024. He experienced severe pain, nausea, and vomiting the following day, leading to multiple ER visits and hospital admissions. He currently reports generalized dull, achy pain and a low-grade fever of 100.5°F. A chest x-ray and blood tests, including CBC with differential, CMP, C-reactive protein, sedimentation rate, and liver function tests, will be performed to rule out any complications such as abscess formation. He is advised to avoid immunizations until his temperature normalizes.    2. Hepatitis C.  He tested positive for Hepatitis C. A Hepatitis C RNA quantitative PCR test will be ordered to determine the viral load. If the viral load is significant, a referral to a liver specialist will be made for further management.    3. Gastritis.  He reports a history of gastritis, likely exacerbated by vomiting and smoking. He is advised to avoid irritants such as alcohol and smoking to manage his symptoms.    4. Fever of unknown origin.  He presents with a low-grade fever of 100.5°F. Differential diagnoses include postoperative infection, residual effects of COVID-19, or other underlying infections. Blood tests and a chest x-ray will be performed to identify the cause.    5. Substance abuse.  He reports a history of drug use but has been clean for over a month. He is encouraged to continue his rehabilitation efforts and avoid any narcotics.      Orders Placed This Encounter   Procedures    Hepatitis C RNA, Quantitative PCR    XR chest pa and lateral    CBC and differential    Comprehensive metabolic panel    C-reactive protein    Sedimentation rate, automated    UA w Reflex to Microscopic w Reflex to  Culture    Gamma GT       Depression Screening and Follow-up Plan: Patient was screened for depression during today's encounter. They screened negative with a PHQ-2 score of 0.        History of Present Illness     Transitional Care Management Review:   Yuval Matute is a 34 y.o. male here for TCM follow up.     During the TCM phone call patient stated:  TCM Call       Date and time call was made  9/30/2024  2:40 PM    Hospital care reviewed  Records reviewed    Patient was hospitialized at  [facility]    Date of Admission  09/29/24    Date of discharge  09/30/24    Diagnosis  Postoperative Ileus    Disposition  Home    Were the patients medications reviewed and updated  No    Current Symptoms  None          TCM Call       Post hospital issues  Reduced activity    Should patient be enrolled in anticoag monitoring?  No    Scheduled for follow up?  Yes    Patients specialists  Other (comment)    Other specialists names  GI    Did you obtain your prescribed medications  Yes    Do you need help managing your prescriptions or medications  No    Is transportation to your appointment needed  No    I have advised the patient to call PCP with any new or worsening symptoms  Shen Blue -- MA    Living Arrangements  Spouse or Significiant other    Support System  Family    The type of support provided  Emotional; Financial; Physical    Do you have social support  Yes, as much as I need    Are you recieving any outpatient services  No    Are you recieving home care services  No    Are you using any community resources  No    Current waiver services  No    Have you fallen in the last 12 months  No    Interperter language line needed  No    Counseling  Patient    Counseling topics  Importance of RX compliance          History of Present Illness  The patient is a 34-year-old male who presents for TCM transition care following postoperative ileus.    Two weeks ago, while in rehabilitation at [place], he developed  acute gallbladder issues and underwent a laparoscopic cholecystectomy. He was discharged the following day but experienced severe pain, nausea, and vomiting that night, necessitating an ambulance call. He was admitted to [facility] but left against medical advice after feeling better. However, he returned to the ER at [place] due to continued vomiting and was admitted overnight. Since his discharge on Monday, he has been managing his pain with daily doses of Motrin and Tylenol.    He reports occasional coughing, which he attributes to a possible COVID-19 infection contracted from his mother two weeks prior to his rehabilitation.    He also mentions a history of Hepatitis C, diagnosed during his stay at [facility]. He admits to past drug use, including methamphetamine, but has been clean for almost two months following a DUI incident and subsequent rehabilitation.    He was diagnosed with gastritis during his surgery, which he believes may be causing his current stomach pain. He reports no urinary symptoms or signs of a urinary tract infection.    SOCIAL HISTORY  He smokes.    FAMILY HISTORY  His uncle  of hepatitis B. Cancer runs in his family. His uncle had stomach cancer.  Review of Systems   Constitutional:  Negative for activity change, appetite change, chills, diaphoresis, fatigue, fever and unexpected weight change.   HENT:  Negative for congestion, dental problem, drooling, ear discharge, ear pain, facial swelling, mouth sores, nosebleeds, postnasal drip, rhinorrhea, trouble swallowing and voice change.    Eyes:  Negative for photophobia, pain, discharge, redness, itching and visual disturbance.   Respiratory:  Negative for apnea, cough, choking, chest tightness and shortness of breath.    Cardiovascular:  Negative for chest pain and leg swelling.   Gastrointestinal:  Negative for abdominal distention, abdominal pain, constipation, diarrhea and nausea.        2 weeks ago today--which was  "September 26, 2024--while he was in rehab in [place] developed acute gallbladder and underwent cholecystectomy. Came home next day, then following day (Sat nite) n/v and pain and went to [place], stayed overnight, felt better, signed self out AMA.  1 hour later started vomiting and went to [facility] where he was admitted overnight and discharged on Monday Sept 30, 2024.    Endocrine: Negative for polydipsia, polyphagia and polyuria.   Genitourinary:  Negative for decreased urine volume, difficulty urinating, dysuria, enuresis and hematuria.   Musculoskeletal:  Negative for arthralgias, back pain, gait problem and joint swelling.   Skin:  Negative for color change, pallor, rash and wound.   Allergic/Immunologic: Negative for immunocompromised state.   Neurological:  Negative for dizziness, seizures, syncope, facial asymmetry, speech difficulty, light-headedness and headaches.   Hematological:  Negative for adenopathy.   Psychiatric/Behavioral:  Negative for agitation, behavioral problems, confusion and decreased concentration.      Objective     /80 (BP Location: Left arm, Patient Position: Sitting, Cuff Size: Standard)   Pulse 100   Temp 100.5 °F (38.1 °C) (Temporal)   Ht 6' 2\" (1.88 m)   Wt 79.8 kg (176 lb)   SpO2 97%   BMI 22.60 kg/m²     Physical Exam  Vital Signs  Temperature is 100.5.  Physical Exam  Vitals and nursing note reviewed.   Constitutional:       General: He is not in acute distress.     Appearance: Normal appearance. He is well-developed. He is ill-appearing. He is not toxic-appearing or diaphoretic.      Comments: Today:  temp 100.5.  Mom and ? Pt had covid 2 wks before the surg.  Wonders if still present.   Tested + for Hep C   HENT:      Head: Normocephalic and atraumatic.      Right Ear: Tympanic membrane normal.      Left Ear: Tympanic membrane normal.      Nose: Nose normal.      Mouth/Throat:      Mouth: Mucous membranes are moist.   Eyes:      Pupils: Pupils are equal, " round, and reactive to light.   Neck:      Trachea: No tracheal deviation.   Cardiovascular:      Rate and Rhythm: Normal rate and regular rhythm.      Pulses: Normal pulses.      Heart sounds: Normal heart sounds.   Pulmonary:      Effort: Pulmonary effort is normal.      Breath sounds: Normal breath sounds. No wheezing, rhonchi or rales.   Abdominal:      General: Abdomen is flat. Bowel sounds are normal. There is no distension.      Palpations: Abdomen is soft. There is no mass.      Tenderness: There is no abdominal tenderness. There is no guarding or rebound.      Hernia: No hernia is present.      Comments: Multiple stab wounds from recent laparoscopic cholecystectomy 2 weeks ago at [facility] which was followed by postop ileus   Musculoskeletal:         General: Normal range of motion.      Cervical back: Normal range of motion and neck supple.   Lymphadenopathy:      Cervical: No cervical adenopathy.   Skin:     General: Skin is warm and dry.   Neurological:      General: No focal deficit present.      Mental Status: He is alert and oriented to person, place, and time. Mental status is at baseline.   Psychiatric:         Mood and Affect: Mood normal.         Behavior: Behavior normal.         Thought Content: Thought content normal.         Judgment: Judgment normal.       Medications have been reviewed by provider in current encounter    Administrative Statements   I have spent a total time of 30 minutes in caring for this patient on the day of the visit/encounter including Diagnostic results, Prognosis, Risks and benefits of tx options, Instructions for management, Patient and family education, Importance of tx compliance, Risk factor reductions, Impressions, Counseling / Coordination of care, Documenting in the medical record, Reviewing / ordering tests, medicine, procedures  , and Obtaining or reviewing history  .

## 2024-10-11 ENCOUNTER — TELEPHONE (OUTPATIENT)
Age: 34
End: 2024-10-11

## 2024-10-11 DIAGNOSIS — B19.20 HEPATITIS C TEST POSITIVE: Primary | ICD-10-CM

## 2024-10-11 LAB
HCV RNA SERPL NAA+PROBE-ACNC: 56.9 IU/ML
HCV RNA SERPL NAA+PROBE-ACNC: DETECTED K[IU]/ML

## 2024-10-11 NOTE — TELEPHONE ENCOUNTER
Patient called asking if PCP can please review his recent lab and xray results. He states he is really concerned about the Hep C lab result, as he admitted to sharing needles with his partner, and needs to inform him as soon as possible if his test is positive.

## 2024-10-12 NOTE — PROGRESS NOTES
I messaged Dr. ROBERTO Wynn and spoke with pt's Mother this evening, Fri, Oct 11.  Will arrange consult re Hep C low titer.

## 2024-10-21 ENCOUNTER — OFFICE VISIT (OUTPATIENT)
Dept: GASTROENTEROLOGY | Facility: CLINIC | Age: 34
End: 2024-10-21
Payer: MEDICARE

## 2024-10-21 VITALS
WEIGHT: 180 LBS | SYSTOLIC BLOOD PRESSURE: 129 MMHG | BODY MASS INDEX: 24.38 KG/M2 | HEART RATE: 114 BPM | HEIGHT: 72 IN | DIASTOLIC BLOOD PRESSURE: 82 MMHG

## 2024-10-21 DIAGNOSIS — F19.90 IVDU (INTRAVENOUS DRUG USER): Primary | ICD-10-CM

## 2024-10-21 DIAGNOSIS — Z11.59 ENCOUNTER FOR SCREENING FOR OTHER VIRAL DISEASES: ICD-10-CM

## 2024-10-21 DIAGNOSIS — B19.20 HEPATITIS C TEST POSITIVE: ICD-10-CM

## 2024-10-21 PROCEDURE — 99204 OFFICE O/P NEW MOD 45 MIN: CPT | Performed by: STUDENT IN AN ORGANIZED HEALTH CARE EDUCATION/TRAINING PROGRAM

## 2024-10-21 RX ORDER — ACETAMINOPHEN 325 MG/1
650 TABLET ORAL EVERY 6 HOURS PRN
COMMUNITY

## 2024-10-21 RX ORDER — IBUPROFEN 200 MG
200 TABLET ORAL EVERY 8 HOURS PRN
COMMUNITY

## 2024-10-21 NOTE — PROGRESS NOTES
[facility] - Outpatient Consultation  Yuval Matute 34 y.o. male MRN: [id number]  Encounter: [id number]    PCP:  ALYSHA Heller DO, [phone]  Referring Provider:  ALYSHA Heller DO, [phone]    Patient: Yuval Matute, 1990  Reason for Referral: chronic HCV infection     ASSESSMENT/PLAN:  34 y.o. male with history of Asperger's syndrome, COPD, GERD and rectal prolapse who presents for initial evaluation for chronic HCV infection.     He was incidentally noted to have HCV Ab+ and had a subsequent VL which showed 56.9 in October 2024. He had prior HCV VL in April 2023 which was negative. He likely acquired HCV through IVDU and denies prior history of acute hepatitis or jaundice. His liver chemistries, synthetic function and platelets are normal. He had prior abdominal imaging which showed normal liver appearance.    We reviewed the natural history, prognosis and complications for hepatitis C infection, including progression to cirrhosis and HCC. We discussed that virologic cure is key in preventing progression of disease. Given low level viremia and likely recent infection, would recommend repeating his blood work to determine if he was able to clear HCV on his own as well completing pretreatment labs to exclude HIV and HBV co-infection.  He will also need an HCV Fibrosure and US elastography to exclude advanced fibrosis, but my suspicion for this is low.     If he is still viremic, would recommend treatment with Mavyret or Epclusa depending on his insurance coverage. We discussed that he needs to complete the entire duration of therapy to prevent resistance, treatment failure and limitations in treatment options. Also discussed need to abstain from IVDU and safe needle-sharing practices. He also agrees to follow up with scheduled office and lab visits.     He will return to clinic in 4 months or sooner if needed. Thank you for the opportunity to consult in his care.    - HCV GT  and VL  - HCV Fibrosure and US elastography  - HAV IgG, HBsAg, HBcAb, and HBsAb; vaccinate if non-immune   - HIV    Meredith Marcelino MD  Division of Gastroenterology and Hepatology  [facility]    ============================================================================  CC/HPI: 34 y.o. male with history of Asperger's syndrome, COPD, GERD and rectal prolapse who presents for initial evaluation for chronic HCV infection.     He underwent laparoscopic CCY three weeks ago for acute cholecystitis. He was incidentally noted to have HCV Ab+ at his rehab. He had a subsequent VL which showed 56.9 in October 2024. He had prior HCV VL in April 2023 which was negative. He denies prior history of acute hepatitis or jaundice. He has never had a liver biopsy or has been treated for HCV infection.     He likely acquired HCV through IVDU with methamphetamines. He reports using for the past 2 years with recent relapse 1 month ago. He reports sharing needles. Denies history of heavy EtOH consumption. Denies family history of liver disease.     ROS: Complete review of systems otherwise negative.     PAST MEDICAL/SURGICAL HISTORY:  Past Medical History:   Diagnosis Date    Asperger's syndrome     Colon polyp     COPD (chronic obstructive pulmonary disease) (HCC)     GERD (gastroesophageal reflux disease)     Headache(784.0)     Rectal prolapse     Rectal prolapse 05/09/2018        Past Surgical History:   Procedure Laterality Date    COLONOSCOPY      HERNIA REPAIR      IA COLONOSCOPY FLX DX W/COLLJ SPEC WHEN PFRMD N/A 05/07/2018    Procedure: COLONOSCOPY;  Surgeon: Yvonne Cole MD;  Location: [facility];  Service: Colorectal    IA LAPAROSCOPY PROCTOPEXY PROLAPSE SIGMOID RESCJ N/A 05/08/2018    Procedure: LAPAROSCOPIC HAND-ASSIST REPAIR OF RECTAL PROLAPSE BY SACRAL  PROTOPEXY;  Surgeon: Yvonne Cole MD;  Location: [facility];  Service: Colorectal    IA SIGMOIDOSCOPY FLX DX W/COLLJ SPEC BR/WA IF PFRMD  N/A 06/19/2018    Procedure: SIGMOIDOSCOPY FLEXIBLE;  Surgeon: Yvonne Cole MD;  Location: [facility];  Service: Colorectal    SURGERY SCROTAL / TESTICULAR      UPPER GASTROINTESTINAL ENDOSCOPY         FAMILY/SOCIAL HISTORY:  Family History   Problem Relation Age of Onset    Colon cancer Maternal Grandmother        Social History     Tobacco Use    Smoking status: Every Day     Current packs/day: 1.00     Average packs/day: 1 pack/day for 15.0 years (15.0 ttl pk-yrs)     Types: Cigarettes    Smokeless tobacco: Never   Vaping Use    Vaping status: Never Used   Substance Use Topics    Alcohol use: Never    Drug use: Not Currently     Types: Methamphetamines     Comment: Daily user -- would like to quit 2-28-23       MEDICATIONS:  Current Outpatient Medications on File Prior to Visit   Medication Sig Dispense Refill    acetaminophen (TYLENOL) 325 mg tablet Take 650 mg by mouth every 6 (six) hours as needed for mild pain      ibuprofen (MOTRIN) 200 mg tablet Take 200 mg by mouth every 8 (eight) hours as needed for mild pain      acyclovir (Zovirax) 800 mg tablet Take 1 tablet (800 mg total) by mouth 2 (two) times a day for 10 days (Patient not taking: Reported on 7/26/2022) 20 tablet 0    busPIRone (BUSPAR) 10 mg tablet Take 1 tablet by mouth 3 (three) times a day (Patient not taking: Reported on 10/21/2024)      diphenhydrAMINE HCl (ELIAN-SELTZER PLUS ALLERGY PO) Take by mouth (Patient not taking: Reported on 10/21/2024)      nicotine (NICODERM CQ) 21 mg/24 hr TD 24 hr patch Place 1 patch on the skin over 24 hours daily (Patient not taking: Reported on 10/21/2024) 28 patch 0    ondansetron (ZOFRAN) 4 mg tablet Take 1 tablet (4 mg total) by mouth every 6 (six) hours as needed for nausea or vomiting for up to 7 days (Patient not taking: Reported on 10/21/2024) 28 tablet 0    pantoprazole (PROTONIX) 40 mg tablet Take 1 tablet (40 mg total) by mouth daily (Patient not taking: Reported on 10/21/2024) 30 tablet 0     No  current facility-administered medications on file prior to visit.     No Known Allergies    PHYSICAL EXAM:  /82 (BP Location: Left arm, Patient Position: Sitting, Cuff Size: Standard)   Pulse (!) 114   Ht 6' (1.829 m)   Wt 81.6 kg (180 lb)   BMI 24.41 kg/m²   GENERAL: NAD, AAO  HEENT: anicteric, OP clear, MMM  ABDOMEN: S/ND/NT, normoactive BS, no hepatomegaly, spleen not palpable  EXTREMITIES: no edema  SKIN: no rashes, no palmar erythema, no spider angiomata   NEURO: normal gait, no tremor, no asterixis     LABS/RADIOLOGY/ENDOSCOPY:  Lab Results   Component Value Date    WBC 8.62 10/10/2024    HGB 13.8 10/10/2024    HCT 43.9 10/10/2024     (H) 10/10/2024    GLUF 91 04/15/2022    BUN 17 10/10/2024    CREATININE 0.73 10/10/2024    K 4.1 10/10/2024    CL 99 10/10/2024    CO2 32 10/10/2024    BILIDIR 0.1 09/25/2024    ALKPHOS 81 10/10/2024    ALT 17 10/10/2024    AST 14 10/10/2024    GLOB 2.5 08/10/2020    CALCIUM 9.9 10/10/2024    EGFR 121 10/10/2024    TRIG 104 04/15/2022    HDL 44 04/15/2022    PT 13.4 09/24/2024    INR 1.0 09/24/2024    PTT 24 07/18/2024    GGT 30 10/10/2024     CT A/P (9/2024)  Multiple dilated fluid-filled small bowel loops throughout the abdomen without discrete transition point, most consistent with ileus.     Trace bilateral pleural effusion.    MELD 3.0: 9 at 9/26/2024  6:05 AM  MELD-Na: 6 at 9/26/2024  6:05 AM  Calculated from:  Serum Creatinine: 0.69 mg/dL (Using min of 1 mg/dL) at 9/26/2024  6:05 AM  Serum Sodium: 133 mmol/L at 9/26/2024  6:05 AM  Total Bilirubin: 0.4 mg/dL (Using min of 1 mg/dL) at 9/26/2024  6:05 AM  Serum Albumin: 3.5 g/dL at 9/26/2024  6:05 AM  INR(ratio): 1.0 at 9/24/2024  5:25 PM  Age at listing (hypothetical): 34 years  Sex: Male at 9/26/2024  6:05 AM

## 2024-11-12 ENCOUNTER — HOSPITAL ENCOUNTER (OUTPATIENT)
Dept: ULTRASOUND IMAGING | Facility: HOSPITAL | Age: 34
Discharge: HOME | End: 2024-11-12
Attending: STUDENT IN AN ORGANIZED HEALTH CARE EDUCATION/TRAINING PROGRAM
Payer: MEDICARE

## 2024-11-12 DIAGNOSIS — B19.20 HEPATITIS C TEST POSITIVE: ICD-10-CM

## 2024-11-12 PROCEDURE — 76981 USE PARENCHYMA: CPT

## 2024-11-18 ENCOUNTER — RESULTS FOLLOW-UP (OUTPATIENT)
Dept: GASTROENTEROLOGY | Facility: AMBULARY SURGERY CENTER | Age: 34
End: 2024-11-18

## 2024-11-18 NOTE — TELEPHONE ENCOUNTER
----- Message from Meredith Marcelino MD sent at 11/18/2024  8:33 AM EST -----  Pt needs to repeat labs to determine if HCV treatment is needed.

## 2024-11-20 NOTE — TELEPHONE ENCOUNTER
Confirmed pt read MCM from Dr Marcelino regarding US elastography results. Pt aware of need to complete pre treatment labs. MCM sent to pt to initiate education on hep c treatment

## 2024-11-25 NOTE — TELEPHONE ENCOUNTER
Spoke with pt's mother Vandana. Advised of need for additional bw to proceed with HCV treatment. All questions and concerns addressed to Vandana's satisfaction

## 2024-12-06 ENCOUNTER — APPOINTMENT (OUTPATIENT)
Dept: LAB | Facility: HOSPITAL | Age: 34
End: 2024-12-06
Attending: STUDENT IN AN ORGANIZED HEALTH CARE EDUCATION/TRAINING PROGRAM
Payer: MEDICARE

## 2024-12-06 DIAGNOSIS — B19.20 HEPATITIS C TEST POSITIVE: ICD-10-CM

## 2024-12-06 DIAGNOSIS — Z11.59 ENCOUNTER FOR SCREENING FOR OTHER VIRAL DISEASES: ICD-10-CM

## 2024-12-06 LAB
HAV AB SER QL IA: NORMAL
HBV CORE AB SER QL: NORMAL
HBV SURFACE AB SER-ACNC: 8.73 MIU/ML
HBV SURFACE AG SER QL: NORMAL
HIV 1+2 AB+HIV1 P24 AG SERPL QL IA: NORMAL
HIV 2 AB SERPL QL IA: NORMAL
HIV1 AB SERPL QL IA: NORMAL
HIV1 P24 AG SERPL QL IA: NORMAL

## 2024-12-06 PROCEDURE — 86706 HEP B SURFACE ANTIBODY: CPT

## 2024-12-06 PROCEDURE — 87389 HIV-1 AG W/HIV-1&-2 AB AG IA: CPT

## 2024-12-06 PROCEDURE — 86708 HEPATITIS A ANTIBODY: CPT

## 2024-12-06 PROCEDURE — 86704 HEP B CORE ANTIBODY TOTAL: CPT

## 2024-12-06 PROCEDURE — 87902 NFCT AGT GNTYP ALYS HEP C: CPT

## 2024-12-06 PROCEDURE — 36415 COLL VENOUS BLD VENIPUNCTURE: CPT

## 2024-12-06 PROCEDURE — 87522 HEPATITIS C REVRS TRNSCRPJ: CPT

## 2024-12-06 PROCEDURE — 87521 HEPATITIS C PROBE&RVRS TRNSC: CPT

## 2024-12-06 PROCEDURE — 87340 HEPATITIS B SURFACE AG IA: CPT

## 2024-12-09 LAB
HCV GENTYP SERPL NAA+PROBE: NORMAL
HCV RNA SERPL NAA+PROBE-ACNC: NOT DETECTED K[IU]/ML

## 2024-12-10 LAB — HCV FIBROSURE RESULTS:: NORMAL

## 2025-04-30 ENCOUNTER — HOSPITAL ENCOUNTER (EMERGENCY)
Facility: HOSPITAL | Age: 35
Discharge: HOME | End: 2025-04-30
Attending: EMERGENCY MEDICINE
Payer: MEDICARE

## 2025-04-30 ENCOUNTER — APPOINTMENT (EMERGENCY)
Dept: CT IMAGING | Facility: HOSPITAL | Age: 35
End: 2025-04-30
Payer: MEDICARE

## 2025-04-30 VITALS
HEART RATE: 72 BPM | DIASTOLIC BLOOD PRESSURE: 68 MMHG | OXYGEN SATURATION: 96 % | TEMPERATURE: 98 F | SYSTOLIC BLOOD PRESSURE: 117 MMHG | RESPIRATION RATE: 15 BRPM

## 2025-04-30 DIAGNOSIS — R10.11 RUQ ABDOMINAL PAIN: Primary | ICD-10-CM

## 2025-04-30 DIAGNOSIS — K59.00 CONSTIPATION, UNSPECIFIED CONSTIPATION TYPE: ICD-10-CM

## 2025-04-30 LAB
ALBUMIN SERPL BCG-MCNC: 4.2 G/DL (ref 3.5–5)
ALP SERPL-CCNC: 95 U/L (ref 34–104)
ALT SERPL W P-5'-P-CCNC: 15 U/L (ref 7–52)
ANION GAP SERPL CALCULATED.3IONS-SCNC: 6 MMOL/L (ref 4–13)
AST SERPL W P-5'-P-CCNC: 16 U/L (ref 13–39)
BASOPHILS # BLD AUTO: 0.05 THOUSANDS/ÂΜL (ref 0–0.1)
BASOPHILS NFR BLD AUTO: 1 % (ref 0–1)
BILIRUB SERPL-MCNC: 0.36 MG/DL (ref 0.2–1)
BUN SERPL-MCNC: 27 MG/DL (ref 5–25)
CALCIUM SERPL-MCNC: 9.4 MG/DL (ref 8.4–10.2)
CHLORIDE SERPL-SCNC: 101 MMOL/L (ref 96–108)
CO2 SERPL-SCNC: 31 MMOL/L (ref 21–32)
CREAT SERPL-MCNC: 0.79 MG/DL (ref 0.6–1.3)
CRP SERPL QL: 1.4 MG/L
EOSINOPHIL # BLD AUTO: 0.08 THOUSAND/ÂΜL (ref 0–0.61)
EOSINOPHIL NFR BLD AUTO: 1 % (ref 0–6)
ERYTHROCYTE [DISTWIDTH] IN BLOOD BY AUTOMATED COUNT: 13.8 % (ref 11.6–15.1)
GFR SERPL CREATININE-BSD FRML MDRD: 116 ML/MIN/1.73SQ M
GLUCOSE SERPL-MCNC: 95 MG/DL (ref 65–140)
HCT VFR BLD AUTO: 40.9 % (ref 36.5–49.3)
HGB BLD-MCNC: 13.6 G/DL (ref 12–17)
IMM GRANULOCYTES # BLD AUTO: 0.02 THOUSAND/UL (ref 0–0.2)
IMM GRANULOCYTES NFR BLD AUTO: 0 % (ref 0–2)
LIPASE SERPL-CCNC: 29 U/L (ref 11–82)
LYMPHOCYTES # BLD AUTO: 2.65 THOUSANDS/ÂΜL (ref 0.6–4.47)
LYMPHOCYTES NFR BLD AUTO: 36 % (ref 14–44)
MCH RBC QN AUTO: 27.8 PG (ref 26.8–34.3)
MCHC RBC AUTO-ENTMCNC: 33.3 G/DL (ref 31.4–37.4)
MCV RBC AUTO: 84 FL (ref 82–98)
MONOCYTES # BLD AUTO: 0.55 THOUSAND/ÂΜL (ref 0.17–1.22)
MONOCYTES NFR BLD AUTO: 8 % (ref 4–12)
NEUTROPHILS # BLD AUTO: 3.97 THOUSANDS/ÂΜL (ref 1.85–7.62)
NEUTS SEG NFR BLD AUTO: 54 % (ref 43–75)
NRBC BLD AUTO-RTO: 0 /100 WBCS
PLATELET # BLD AUTO: 227 THOUSANDS/UL (ref 149–390)
PMV BLD AUTO: 9.5 FL (ref 8.9–12.7)
POTASSIUM SERPL-SCNC: 4 MMOL/L (ref 3.5–5.3)
PROT SERPL-MCNC: 6.8 G/DL (ref 6.4–8.4)
RBC # BLD AUTO: 4.9 MILLION/UL (ref 3.88–5.62)
SODIUM SERPL-SCNC: 138 MMOL/L (ref 135–147)
WBC # BLD AUTO: 7.32 THOUSAND/UL (ref 4.31–10.16)

## 2025-04-30 PROCEDURE — 86140 C-REACTIVE PROTEIN: CPT | Performed by: EMERGENCY MEDICINE

## 2025-04-30 PROCEDURE — 85025 COMPLETE CBC W/AUTO DIFF WBC: CPT | Performed by: EMERGENCY MEDICINE

## 2025-04-30 PROCEDURE — 96374 THER/PROPH/DIAG INJ IV PUSH: CPT

## 2025-04-30 PROCEDURE — 80053 COMPREHEN METABOLIC PANEL: CPT | Performed by: EMERGENCY MEDICINE

## 2025-04-30 PROCEDURE — 96375 TX/PRO/DX INJ NEW DRUG ADDON: CPT

## 2025-04-30 PROCEDURE — 99285 EMERGENCY DEPT VISIT HI MDM: CPT | Performed by: EMERGENCY MEDICINE

## 2025-04-30 PROCEDURE — 36415 COLL VENOUS BLD VENIPUNCTURE: CPT | Performed by: EMERGENCY MEDICINE

## 2025-04-30 PROCEDURE — 83690 ASSAY OF LIPASE: CPT | Performed by: EMERGENCY MEDICINE

## 2025-04-30 PROCEDURE — 99285 EMERGENCY DEPT VISIT HI MDM: CPT

## 2025-04-30 PROCEDURE — 96361 HYDRATE IV INFUSION ADD-ON: CPT

## 2025-04-30 PROCEDURE — 74177 CT ABD & PELVIS W/CONTRAST: CPT

## 2025-04-30 RX ORDER — MORPHINE SULFATE 4 MG/ML
4 INJECTION, SOLUTION INTRAMUSCULAR; INTRAVENOUS ONCE
Status: COMPLETED | OUTPATIENT
Start: 2025-04-30 | End: 2025-04-30

## 2025-04-30 RX ORDER — KETOROLAC TROMETHAMINE 30 MG/ML
15 INJECTION, SOLUTION INTRAMUSCULAR; INTRAVENOUS ONCE
Status: DISCONTINUED | OUTPATIENT
Start: 2025-04-30 | End: 2025-04-30

## 2025-04-30 RX ORDER — KETOROLAC TROMETHAMINE 30 MG/ML
15 INJECTION, SOLUTION INTRAMUSCULAR; INTRAVENOUS ONCE
Status: COMPLETED | OUTPATIENT
Start: 2025-04-30 | End: 2025-04-30

## 2025-04-30 RX ORDER — POLYETHYLENE GLYCOL 3350 17 G/17G
17 POWDER, FOR SOLUTION ORAL DAILY PRN
Qty: 10 EACH | Refills: 0 | Status: SHIPPED | OUTPATIENT
Start: 2025-04-30

## 2025-04-30 RX ADMIN — SODIUM CHLORIDE 1000 ML: 0.9 INJECTION, SOLUTION INTRAVENOUS at 02:51

## 2025-04-30 RX ADMIN — IOHEXOL 100 ML: 350 INJECTION, SOLUTION INTRAVENOUS at 03:35

## 2025-04-30 RX ADMIN — KETOROLAC TROMETHAMINE 15 MG: 30 INJECTION, SOLUTION INTRAMUSCULAR at 02:50

## 2025-04-30 RX ADMIN — MORPHINE SULFATE 4 MG: 4 INJECTION INTRAVENOUS at 04:06

## 2025-04-30 NOTE — ED PROVIDER NOTES
Time reflects when diagnosis was documented in both MDM as applicable and the Disposition within this note       Time User Action Codes Description Comment    4/30/2025  4:23 AM Kash Coronado [R10.11] RUQ abdominal pain     4/30/2025  4:23 AM Kash Coronado [K59.00] Constipation, unspecified constipation type           ED Disposition       ED Disposition   Discharge    Condition   Stable    Date/Time   Wed Apr 30, 2025  4:23 AM    Comment   Yuval Matute discharge to home/self care.                   Assessment & Plan       Medical Decision Making  Abdominal pain: Constipation present on CT but unclear if this is the etiology of pain.  Will treat with MiraLAX and have patient follow-up as an outpatient.  Repeat abdominal exam nonacute.  GI bleeding: Hemoglobin stable, patient reports chronic GI bleeding secondary to hemorrhoids and is presenting similarly.    Amount and/or Complexity of Data Reviewed  Labs: ordered. Decision-making details documented in ED Course.  Radiology: ordered.    Risk  OTC drugs.  Prescription drug management.        ED Course as of 04/30/25 0427   Wed Apr 30, 2025   0237 Differential includes ileus, SBO, colitis, constipation.  Will obtain CT.   0328 AST: 16   0328 ALK PHOS: 95  Reassuring with no suggestion of biliary obstruction.   0402 Patient reports unchanged pain.  Morphine ordered.   0422 Patient reports feeling well.  I discussed CT findings of constipation and how this may potentially explain his symptoms but this is not conclusive, and patient still may have other underlying process not detected on CT.  I discussed need for taking MiraLAX, following up with outpatient provider, and returning to emergency department if worse.       Medications   ketorolac (TORADOL) injection 15 mg (15 mg Intravenous Given 4/30/25 0250)   sodium chloride 0.9 % bolus 1,000 mL (0 mL Intravenous Stopped 4/30/25 0351)   iohexol (OMNIPAQUE) 350 MG/ML injection (SINGLE-DOSE) 100 mL (100 mL  Intravenous Given 4/30/25 0335)   morphine injection 4 mg (4 mg Intravenous Given 4/30/25 9544)       ED Risk Strat Scores                    No data recorded        SBIRT 20yo+      Flowsheet Row Most Recent Value   Initial Alcohol Screen: US AUDIT-C     1. How often do you have a drink containing alcohol? 0 Filed at: 04/30/2025 0204   2. How many drinks containing alcohol do you have on a typical day you are drinking?  0 Filed at: 04/30/2025 0204   3a. Male UNDER 65: How often do you have five or more drinks on one occasion? 0 Filed at: 04/30/2025 0204   Audit-C Score 0 Filed at: 04/30/2025 0204   ROCHELLE: How many times in the past year have you...    Used an illegal drug or used a prescription medication for non-medical reasons? Never Filed at: 04/30/2025 0204                            History of Present Illness       Chief Complaint   Patient presents with    Abdominal Pain     RUQ abdominal pain, rectal bleeding x2 days and 1 episode of vomiting earlier today. Denies fevers or diarrhea/constipation.        Past Medical History:   Diagnosis Date    Asperger's syndrome     Colon polyp     COPD (chronic obstructive pulmonary disease) (HCC)     GERD (gastroesophageal reflux disease)     Headache(784.0)     Rectal prolapse     Rectal prolapse 05/09/2018      Past Surgical History:   Procedure Laterality Date    COLONOSCOPY      HERNIA REPAIR      LA COLONOSCOPY FLX DX W/COLLJ SPEC WHEN PFRMD N/A 05/07/2018    Procedure: COLONOSCOPY;  Surgeon: Yvonne Cloe MD;  Location: [facility];  Service: Colorectal    LA LAPAROSCOPY PROCTOPEXY PROLAPSE SIGMOID RESCJ N/A 05/08/2018    Procedure: LAPAROSCOPIC HAND-ASSIST REPAIR OF RECTAL PROLAPSE BY SACRAL  PROTOPEXY;  Surgeon: Yvonne Cole MD;  Location: [facility];  Service: Colorectal    LA SIGMOIDOSCOPY FLX DX W/COLLJ SPEC BR/WA IF PFRMD N/A 06/19/2018    Procedure: SIGMOIDOSCOPY FLEXIBLE;  Surgeon: Yvonne Cole MD;  Location: [facility];  Service: Colorectal     SURGERY SCROTAL / TESTICULAR      UPPER GASTROINTESTINAL ENDOSCOPY        Family History   Problem Relation Age of Onset    Colon cancer Maternal Grandmother       Social History     Tobacco Use    Smoking status: Every Day     Current packs/day: 1.00     Average packs/day: 1 pack/day for 15.0 years (15.0 ttl pk-yrs)     Types: Cigarettes    Smokeless tobacco: Never   Vaping Use    Vaping status: Never Used   Substance Use Topics    Alcohol use: Never    Drug use: Not Currently     Types: Methamphetamines     Comment: Daily user -- would like to quit 2-28-23      E-Cigarette/Vaping    E-Cigarette Use Never User       E-Cigarette/Vaping Substances    Nicotine No     THC No     CBD No     Flavoring No     Other No     Unknown No       I have reviewed and agree with the history as documented.     The patient reports 1 day history of pain in his right upper quadrant that has been constant.  He reports he has been gassy and still having bowel movements.  He denies nausea.  He reports prior history of cholecystectomy.  He denies fevers or chills.  He denies urinary discomfort.  No blood in his urine.  No fevers or chills.  In addition of this, patient reports dark red blood surrounding his stools.  He reports he is a has had similar episodes of bleeding recurrently has seen GI about this, and was advised he has internal hemorrhoids.      Abdominal Pain      Review of Systems   Gastrointestinal:  Positive for abdominal pain.   All other systems reviewed and are negative.          Objective       ED Triage Vitals   Temperature Pulse Blood Pressure Respirations SpO2 Patient Position - Orthostatic VS   04/30/25 0201 04/30/25 0201 04/30/25 0201 04/30/25 0201 04/30/25 0201 04/30/25 0201   98 °F (36.7 °C) 105 137/88 20 97 % Sitting      Temp Source Heart Rate Source BP Location FiO2 (%) Pain Score    04/30/25 0201 04/30/25 0201 04/30/25 0201 -- 04/30/25 0401    Oral Monitor Left arm  6      Vitals      Date and Time Temp  Pulse SpO2 Resp BP Pain Score FACES Pain Rating User   04/30/25 0415 98 °F (36.7 °C) 72 96 % 15 117/68 -- -- AF   04/30/25 0406 -- -- -- -- -- 6 -- AF   04/30/25 0401 -- -- -- -- -- 6 -- AF   04/30/25 0201 98 °F (36.7 °C) 105 97 % 20 137/88 -- -- BM            Physical Exam  Vitals and nursing note reviewed.   Constitutional:       General: He is in acute distress (Mild painful distress).      Appearance: He is well-developed.   HENT:      Head: Normocephalic and atraumatic.   Eyes:      Conjunctiva/sclera: Conjunctivae normal.   Cardiovascular:      Rate and Rhythm: Normal rate and regular rhythm.      Heart sounds: No murmur heard.  Pulmonary:      Effort: Pulmonary effort is normal. No respiratory distress.      Breath sounds: Normal breath sounds.   Abdominal:      Palpations: Abdomen is soft.      Tenderness: There is abdominal tenderness in the right upper quadrant. There is no guarding or rebound.   Musculoskeletal:         General: No swelling.      Cervical back: Neck supple.   Skin:     General: Skin is warm and dry.      Capillary Refill: Capillary refill takes less than 2 seconds.   Neurological:      Mental Status: He is alert.   Psychiatric:         Mood and Affect: Mood normal.         Results Reviewed       Procedure Component Value Units Date/Time    Comprehensive metabolic panel [[id number]]  (Abnormal) Collected: 04/30/25 0250    Lab Status: Final result Specimen: Blood from Arm, Right Updated: 04/30/25 0322     Sodium 138 mmol/L      Potassium 4.0 mmol/L      Chloride 101 mmol/L      CO2 31 mmol/L      ANION GAP 6 mmol/L      BUN 27 mg/dL      Creatinine 0.79 mg/dL      Glucose 95 mg/dL      Calcium 9.4 mg/dL      AST 16 U/L      ALT 15 U/L      Alkaline Phosphatase 95 U/L      Total Protein 6.8 g/dL      Albumin 4.2 g/dL      Total Bilirubin 0.36 mg/dL      eGFR 116 ml/min/1.73sq m     Narrative:      National Kid[employer] guidelines for Chronic Kidney Disease (CKD):     Stage 1 with  normal or high GFR (GFR > 90 mL/min/1.73 square meters)    Stage 2 Mild CKD (GFR = 60-89 mL/min/1.73 square meters)    Stage 3A Moderate CKD (GFR = 45-59 mL/min/1.73 square meters)    Stage 3B Moderate CKD (GFR = 30-44 mL/min/1.73 square meters)    Stage 4 Severe CKD (GFR = 15-29 mL/min/1.73 square meters)    Stage 5 End Stage CKD (GFR <15 mL/min/1.73 square meters)  Note: GFR calculation is accurate only with a steady state creatinine    Lipase [[id number]]  (Normal) Collected: 04/30/25 0250    Lab Status: Final result Specimen: Blood from Arm, Right Updated: 04/30/25 0322     Lipase 29 u/L     C-reactive protein [[id number]]  (Normal) Collected: 04/30/25 0250    Lab Status: Final result Specimen: Blood from Arm, Right Updated: 04/30/25 0322     CRP 1.4 mg/L     CBC and differential [[id number]] Collected: 04/30/25 0250    Lab Status: Final result Specimen: Blood from Arm, Right Updated: 04/30/25 0301     WBC 7.32 Thousand/uL      RBC 4.90 Million/uL      Hemoglobin 13.6 g/dL      Hematocrit 40.9 %      MCV 84 fL      MCH 27.8 pg      MCHC 33.3 g/dL      RDW 13.8 %      MPV 9.5 fL      Platelets 227 Thousands/uL      nRBC 0 /100 WBCs      Segmented % 54 %      Immature Grans % 0 %      Lymphocytes % 36 %      Monocytes % 8 %      Eosinophils Relative 1 %      Basophils Relative 1 %      Absolute Neutrophils 3.97 Thousands/µL      Absolute Immature Grans 0.02 Thousand/uL      Absolute Lymphocytes 2.65 Thousands/µL      Absolute Monocytes 0.55 Thousand/µL      Eosinophils Absolute 0.08 Thousand/µL      Basophils Absolute 0.05 Thousands/µL             CT abdomen pelvis with contrast   Final Interpretation by Greg Ayers MD (04/30 0410)      No acute abdominopelvic abnormality identified.   No free air or free fluid.      Large colonic stool burden. Correlate for constipation.         Workstation performed: [id number]             Procedures    ED Medication and Procedure Management   Prior to Admission Medications    Prescriptions Last Dose Informant Patient Reported? Taking?   acetaminophen (TYLENOL) 325 mg tablet   Yes No   Sig: Take 650 mg by mouth every 6 (six) hours as needed for mild pain   acyclovir (Zovirax) 800 mg tablet   No No   Sig: Take 1 tablet (800 mg total) by mouth 2 (two) times a day for 10 days   Patient not taking: Reported on 7/26/2022   busPIRone (BUSPAR) 10 mg tablet  Self Yes No   Sig: Take 1 tablet by mouth 3 (three) times a day   Patient not taking: Reported on 10/21/2024   diphenhydrAMINE HCl (ELIAN-SELTZER PLUS ALLERGY PO)  Self Yes No   Sig: Take by mouth   Patient not taking: Reported on 10/21/2024   ibuprofen (MOTRIN) 200 mg tablet   Yes No   Sig: Take 200 mg by mouth every 8 (eight) hours as needed for mild pain   nicotine (NICODERM CQ) 21 mg/24 hr TD 24 hr patch   No No   Sig: Place 1 patch on the skin over 24 hours daily   Patient not taking: Reported on 10/21/2024   ondansetron (ZOFRAN) 4 mg tablet   No No   Sig: Take 1 tablet (4 mg total) by mouth every 6 (six) hours as needed for nausea or vomiting for up to 7 days   Patient not taking: Reported on 10/21/2024   pantoprazole (PROTONIX) 40 mg tablet   No No   Sig: Take 1 tablet (40 mg total) by mouth daily   Patient not taking: Reported on 10/21/2024      Facility-Administered Medications: None     Patient's Medications   Discharge Prescriptions    POLYETHYLENE GLYCOL (MIRALAX) 17 G PACKET    Take 17 g by mouth daily as needed (constipation)       Start Date: 4/30/2025 End Date: --       Order Dose: 17 g       Quantity: 10 each    Refills: 0     No discharge procedures on file.  ED SEPSIS DOCUMENTATION   Time reflects when diagnosis was documented in both MDM as applicable and the Disposition within this note       Time User Action Codes Description Comment    4/30/2025  4:23 AM Kash Coronado [R10.11] RUQ abdominal pain     4/30/2025  4:23 AM Kash Coronado [K59.00] Constipation, unspecified constipation type                  Kash  Law Ivonne MD  04/30/25 3197

## 2025-06-25 ENCOUNTER — OFFICE VISIT (OUTPATIENT)
Dept: FAMILY MEDICINE CLINIC | Facility: CLINIC | Age: 35
End: 2025-06-25
Payer: MEDICARE

## 2025-06-25 VITALS
HEART RATE: 75 BPM | HEIGHT: 72 IN | DIASTOLIC BLOOD PRESSURE: 80 MMHG | WEIGHT: 211.2 LBS | TEMPERATURE: 98.4 F | OXYGEN SATURATION: 96 % | SYSTOLIC BLOOD PRESSURE: 118 MMHG | BODY MASS INDEX: 28.61 KG/M2

## 2025-06-25 DIAGNOSIS — J40 BRONCHITIS: Primary | ICD-10-CM

## 2025-06-25 DIAGNOSIS — F19.10 DRUG ABUSE (HCC): ICD-10-CM

## 2025-06-25 DIAGNOSIS — J20.9 ACUTE BRONCHITIS, UNSPECIFIED ORGANISM: ICD-10-CM

## 2025-06-25 PROCEDURE — G2211 COMPLEX E/M VISIT ADD ON: HCPCS | Performed by: FAMILY MEDICINE

## 2025-06-25 PROCEDURE — 99213 OFFICE O/P EST LOW 20 MIN: CPT | Performed by: FAMILY MEDICINE

## 2025-06-25 RX ORDER — DEXTROMETHORPHAN HYDROBROMIDE AND PROMETHAZINE HYDROCHLORIDE 15; 6.25 MG/5ML; MG/5ML
5 SYRUP ORAL 4 TIMES DAILY PRN
Qty: 180 ML | Refills: 0 | Status: SHIPPED | OUTPATIENT
Start: 2025-06-25

## 2025-06-25 RX ORDER — AZITHROMYCIN 250 MG/1
TABLET, FILM COATED ORAL
Qty: 6 TABLET | Refills: 0 | Status: SHIPPED | OUTPATIENT
Start: 2025-06-25 | End: 2025-06-30

## 2025-06-25 NOTE — PATIENT INSTRUCTIONS
Why is Vit D important?  Vitamin D inhibits the growth of both breast cancer and colon cancer.  Furthermore, most cancers occur in patients with vitamin D levels in the 20 to 40 range.  It is recommended to keep vitamin D level greater than 60 as that has proven to reduce breast cancer by 83%.  Thus, I would suggest keeping your vitamin D level in the 60-80 range.  The normal lab range for vitamin D is 30 up to 100.  Levels from 20-30 indicate vitamin D insufficiency and levels below 20 indicate vitamin D deficiency.

## 2025-06-25 NOTE — PROGRESS NOTES
Name: Yuval Matute      : 1990      MRN: [id number]  Encounter Provider: ALYSHA Heller DO  Encounter Date: 2025   Encounter department: [facility]  :  Assessment & Plan  Bronchitis  CC is dry cough and runny nose, smoking and that causes coughing. Runny nose. Onset 2wks. Taking otc Dayquil  Orders:  •  promethazine-dextromethorphan (PHENERGAN-DM) 6.25-15 mg/5 mL oral syrup; Take 5 mL by mouth 4 (four) times a day as needed for cough    Acute bronchitis, unspecified organism    Orders:  •  azithromycin (Zithromax) 250 mg tablet; Take 2 tablets (500 mg total) by mouth daily for 1 day, THEN 1 tablet (250 mg total) daily for 4 days.    Drug abuse (HCC)  Has stopped using drugs!         Assessment & Plan           History of Present Illness   HPI  Review of Systems    Objective   /80 (BP Location: Left arm, Patient Position: Sitting, Cuff Size: Standard)   Pulse 75   Temp 98.4 °F (36.9 °C) (Temporal)   Ht 6' (1.829 m)   Wt 95.8 kg (211 lb 3.2 oz)   SpO2 96%   BMI 28.64 kg/m²      Physical Exam  Administrative Statements   I have spent a total time of 20 minutes in caring for this patient on the day of the visit/encounter including .

## (undated) DEVICE — INSULATED BLADE ELECTRODE;CAUTION: FOR MANUFACTURING, PROCESSING, OR REPACKING.: Brand: EDGE

## (undated) DEVICE — SUT PROLENE 2-0 KS 30 IN 8623H

## (undated) DEVICE — MAYO STAND COVER: Brand: CONVERTORS

## (undated) DEVICE — POOLE SUCTION HANDLE: Brand: CARDINAL HEALTH

## (undated) DEVICE — STERILE MAJOR GENERAL PACK: Brand: CARDINAL HEALTH

## (undated) DEVICE — SUT VICRYL 0 UR-6 27 IN J603H

## (undated) DEVICE — ENDOPATH XCEL UNIVERSAL TROCAR STABLILITY SLEEVES: Brand: ENDOPATH XCEL

## (undated) DEVICE — TRAVELKIT CONTAINS FIRST STEP KIT (200ML EP-4 KIT) AND SOILED SCOPE BAG - 1 KIT: Brand: TRAVELKIT CONTAINS FIRST STEP KIT AND SOILED SCOPE BAG

## (undated) DEVICE — ENSEAL LAPAROSCOPIC TISSUE SEALER G2 CURVED JAW FOR USE WITH G2 GENERATOR 5MM DIAMETER 35CM SHAFT LENGTH: Brand: ENSEAL

## (undated) DEVICE — SUT VICRYL 2-0 SH 27 IN UNDYED J417H

## (undated) DEVICE — GLOVE INDICATOR PI UNDERGLOVE SZ 8 BLUE

## (undated) DEVICE — SUT VICRYL 0 CT-1 27 IN J260H

## (undated) DEVICE — INSUFLATION TUBING INSUFLOW (LEXION)

## (undated) DEVICE — SUT PROLENE 0 CT-2 30 IN 8412H

## (undated) DEVICE — WOUND RETRACTOR AND PROTECTOR: Brand: ALEXIS O WOUND PROTECTOR-RETRACTOR

## (undated) DEVICE — SUT VICRYL 0 REEL 54 IN J287G

## (undated) DEVICE — INTENDED FOR TISSUE SEPARATION, AND OTHER PROCEDURES THAT REQUIRE A SHARP SURGICAL BLADE TO PUNCTURE OR CUT.: Brand: BARD-PARKER SAFETY BLADES SIZE 11, STERILE

## (undated) DEVICE — IRRIG ENDO FLO TUBING

## (undated) DEVICE — SUT MONOCRYL 4-0 PS-2 18 IN Y496G

## (undated) DEVICE — 3000CC GUARDIAN II: Brand: GUARDIAN

## (undated) DEVICE — ADHESIVE SKN CLSR HISTOACRYL FLEX 0.5ML LF

## (undated) DEVICE — ENDOPATH XCEL BLADELESS TROCARS WITH STABILITY SLEEVES: Brand: ENDOPATH XCEL

## (undated) DEVICE — Device: Brand: DEFENDO AIR/WATER/SUCTION AND BIOPSY VALVE

## (undated) DEVICE — INTENDED FOR TISSUE SEPARATION, AND OTHER PROCEDURES THAT REQUIRE A SHARP SURGICAL BLADE TO PUNCTURE OR CUT.: Brand: BARD-PARKER SAFETY BLADES SIZE 10, STERILE

## (undated) DEVICE — INTENDED FOR TISSUE SEPARATION, AND OTHER PROCEDURES THAT REQUIRE A SHARP SURGICAL BLADE TO PUNCTURE OR CUT.: Brand: BARD-PARKER SAFETY BLADES SIZE 15, STERILE

## (undated) DEVICE — CHLORAPREP HI-LITE 26ML ORANGE

## (undated) DEVICE — INVIEW CLEAR LEGGINGS: Brand: CONVERTORS

## (undated) DEVICE — ANTI-FOG SOLUTION WITH FOAM PAD: Brand: DEVON

## (undated) DEVICE — DRAPE FLUID WARMER (BIRD BATH)

## (undated) DEVICE — CO2 AND WATER TUBING/CAP SET FOR OLYMPUS® SCOPES & UCR: Brand: ERBE

## (undated) DEVICE — TRAY FOLEY 16FR URIMETER SURESTEP

## (undated) DEVICE — STRL COTTON TIP APPLCTR 6IN PK: Brand: CARDINAL HEALTH

## (undated) DEVICE — SUT PDS II 1 CTX 36 IN Z371T

## (undated) DEVICE — BUTTON SWITCH PENCIL HOLSTER: Brand: VALLEYLAB

## (undated) DEVICE — 3M™ IOBAN™ 2 ANTIMICROBIAL INCISE DRAPE 6650EZ: Brand: IOBAN™ 2

## (undated) DEVICE — GLOVE SRG BIOGEL ECLIPSE 7.5